# Patient Record
Sex: FEMALE | Race: NATIVE HAWAIIAN OR OTHER PACIFIC ISLANDER | HISPANIC OR LATINO | Employment: FULL TIME | URBAN - METROPOLITAN AREA
[De-identification: names, ages, dates, MRNs, and addresses within clinical notes are randomized per-mention and may not be internally consistent; named-entity substitution may affect disease eponyms.]

---

## 2017-01-16 ENCOUNTER — ALLSCRIPTS OFFICE VISIT (OUTPATIENT)
Dept: OTHER | Facility: OTHER | Age: 47
End: 2017-01-16

## 2017-02-07 ENCOUNTER — ALLSCRIPTS OFFICE VISIT (OUTPATIENT)
Dept: OTHER | Facility: OTHER | Age: 47
End: 2017-02-07

## 2017-02-07 DIAGNOSIS — R80.9 PROTEINURIA: ICD-10-CM

## 2017-09-12 ENCOUNTER — GENERIC CONVERSION - ENCOUNTER (OUTPATIENT)
Dept: OTHER | Facility: OTHER | Age: 47
End: 2017-09-12

## 2017-09-12 ENCOUNTER — GENERIC CONVERSION - ENCOUNTER (OUTPATIENT)
Dept: NEPHROLOGY | Facility: CLINIC | Age: 47
End: 2017-09-12

## 2017-09-12 DIAGNOSIS — I10 ESSENTIAL (PRIMARY) HYPERTENSION: ICD-10-CM

## 2018-01-13 VITALS
BODY MASS INDEX: 35.51 KG/M2 | SYSTOLIC BLOOD PRESSURE: 120 MMHG | RESPIRATION RATE: 16 BRPM | WEIGHT: 208 LBS | HEIGHT: 64 IN | HEART RATE: 60 BPM | DIASTOLIC BLOOD PRESSURE: 88 MMHG | TEMPERATURE: 98.3 F

## 2018-01-15 VITALS
HEIGHT: 64 IN | DIASTOLIC BLOOD PRESSURE: 88 MMHG | SYSTOLIC BLOOD PRESSURE: 128 MMHG | HEART RATE: 60 BPM | BODY MASS INDEX: 35.51 KG/M2 | WEIGHT: 208 LBS

## 2018-01-22 VITALS — RESPIRATION RATE: 16 BRPM | SYSTOLIC BLOOD PRESSURE: 120 MMHG | DIASTOLIC BLOOD PRESSURE: 70 MMHG | HEART RATE: 60 BPM

## 2018-01-22 VITALS — BODY MASS INDEX: 34.31 KG/M2 | HEIGHT: 64 IN | WEIGHT: 201 LBS | TEMPERATURE: 98.3 F

## 2018-03-05 DIAGNOSIS — G43.009 MIGRAINE WITHOUT AURA AND WITHOUT STATUS MIGRAINOSUS, NOT INTRACTABLE: Primary | ICD-10-CM

## 2018-03-05 RX ORDER — SUMATRIPTAN SUCC/NAPROXEN SOD 85MG-500MG
TABLET ORAL
Qty: 9 TABLET | Refills: 0 | OUTPATIENT
Start: 2018-03-05

## 2018-03-05 RX ORDER — SUMATRIPTAN AND NAPROXEN SODIUM 85; 500 MG/1; MG/1
1 TABLET, FILM COATED ORAL EVERY 2 HOUR PRN
Qty: 9 TABLET | Refills: 5 | Status: SHIPPED | OUTPATIENT
Start: 2018-03-05 | End: 2018-06-12 | Stop reason: SDUPTHER

## 2018-06-12 ENCOUNTER — OFFICE VISIT (OUTPATIENT)
Dept: NEUROLOGY | Facility: CLINIC | Age: 48
End: 2018-06-12
Payer: COMMERCIAL

## 2018-06-12 VITALS
SYSTOLIC BLOOD PRESSURE: 134 MMHG | DIASTOLIC BLOOD PRESSURE: 84 MMHG | HEIGHT: 63 IN | BODY MASS INDEX: 34.94 KG/M2 | HEART RATE: 53 BPM | WEIGHT: 197.2 LBS

## 2018-06-12 DIAGNOSIS — G43.009 MIGRAINE WITHOUT AURA AND WITHOUT STATUS MIGRAINOSUS, NOT INTRACTABLE: Primary | ICD-10-CM

## 2018-06-12 PROCEDURE — 99213 OFFICE O/P EST LOW 20 MIN: CPT | Performed by: PSYCHIATRY & NEUROLOGY

## 2018-06-12 RX ORDER — SUMATRIPTAN AND NAPROXEN SODIUM 85; 500 MG/1; MG/1
TABLET, FILM COATED ORAL
Qty: 9 TABLET | Refills: 5 | Status: SHIPPED | OUTPATIENT
Start: 2018-06-12 | End: 2019-01-02 | Stop reason: SDUPTHER

## 2018-06-12 RX ORDER — NEBIVOLOL 10 MG/1
1 TABLET ORAL DAILY
COMMUNITY
End: 2018-08-02 | Stop reason: SDUPTHER

## 2018-06-12 RX ORDER — DIPHENHYDRAMINE HCL 25 MG
25 CAPSULE ORAL
COMMUNITY
End: 2020-11-17 | Stop reason: ALTCHOICE

## 2018-06-12 NOTE — PROGRESS NOTES
Shravan Milian is a 46 y o  female returns in follow-up today with history of migraine    Assessment:  1  Migraine without aura and without status migrainosus, not intractable        Plan:  Treximet as needed for migraine headache  Follow-up 6 months    Discussion:  Anthony Alba has been having about 4 migraine headaches a month  Have recommended initiating Treximet  If there issues with her insurance she understands that we can prescribe Imitrex and she can just take 2 Aleve with the medication  I will see her back in 6 months      Subjective:    HPI  Anthony Alba reports that since here last she had gone through menopause but then started having cycles again  She underwent evaluation gynecologically and no abnormalities were found  Because she is having cycles again she has started having more headaches  She states on average she has about 1 migraine a week     Several months ago she tried to renew her Treximet and was told that it would not be covered by her insurance  Since that time has gone generic  Will try to prescribe it again and if it is rejected she understands that she can take Imitrex with Aleve for similar affect      Past Medical History:   Diagnosis Date    Fatigue     Hypertension     Migraine     Proteinuria        Family History:  Family History   Problem Relation Age of Onset    Stroke Mother     Diabetes Mother     Hypertension Mother     Kidney failure Mother     Dialysis Mother     No Known Problems Father        Past Surgical History:  Past Surgical History:   Procedure Laterality Date    ENDOMETRIAL BIOPSY  02/26/2018    FOOT SURGERY Bilateral     reconstructive    TONSILLECTOMY      TUBAL LIGATION         Social History:   reports that she has never smoked  She has never used smokeless tobacco  She reports that she does not drink alcohol or use drugs      Allergies:  Amoxicillin; Ciprofloxacin; Hydrocodone-acetaminophen; Nitrofurantoin; Sulfa antibiotics; Sulfamethoxazole-trimethoprim; Influenza vaccines; Lisinopril; Oxycodone; Quinolones; and Amlodipine      Current Outpatient Prescriptions:     diphenhydrAMINE (BENADRYL) 25 mg capsule, Take by mouth, Disp: , Rfl:     nebivolol (BYSTOLIC) 10 mg tablet, Take 1 tablet by mouth daily, Disp: , Rfl:     norethindrone (AYGESTIN) 5 mg tablet, Take 5 mg by mouth daily, Disp: , Rfl:     SUMAtriptan-naproxen (TREXIMET)  MG per tablet, Take 1 tablet by mouth every 2 (two) hours as needed for migraine (headache) Max 2/24 hr, Disp: 9 tablet, Rfl: 5    I have reviewed the past medical, social and family history, current medications, allergies, vitals, review of systems and updated this information as appropriate today     Objective:    Vitals:  Blood pressure 134/84, pulse (!) 53, height 5' 3" (1 6 m), weight 89 4 kg (197 lb 3 2 oz)  Physical Exam    Neurological Exam  GENERAL:  Well-developed well-nourished woman in no acute distress  HEENT/NECK: Head is atraumatic normocephalic, neck is supple  CARDIOVASCULAR:  No Carotid bruit  NEUROLOGIC:  Mental Status: Awake and alert without aphasia  Cranial Nerves: Extraocular movements are full  Face is symmetrical  Motor:  No drift is noted on arm extension  Coordination:  Finger-to-nose testing is performed accurately  Romberg is negative  Gait is stable              ROS:    Review of Systems   Constitutional: Negative for chills, fatigue and fever  HENT: Negative for congestion, postnasal drip, sinus pain, sinus pressure, sore throat, tinnitus and trouble swallowing  Eyes: Negative for pain, discharge and visual disturbance  Respiratory: Negative for cough, shortness of breath and wheezing  Cardiovascular: Negative  Gastrointestinal: Negative for abdominal distention, abdominal pain, nausea and vomiting  Endocrine: Negative for cold intolerance and heat intolerance  Genitourinary: Negative for difficulty urinating, frequency, hematuria and urgency  Musculoskeletal: Negative for arthralgias, back pain, gait problem, neck pain and neck stiffness  Skin: Negative for rash and wound  Allergic/Immunologic: Negative for food allergies  Neurological: Positive for headaches  Negative for dizziness, tremors, seizures, syncope, facial asymmetry, speech difficulty, weakness, light-headedness and numbness  Hematological: Negative  Psychiatric/Behavioral: Negative for confusion, decreased concentration and sleep disturbance

## 2018-08-02 DIAGNOSIS — I10 ESSENTIAL HYPERTENSION: Primary | ICD-10-CM

## 2018-08-02 RX ORDER — NEBIVOLOL 10 MG/1
10 TABLET ORAL DAILY
Qty: 90 TABLET | Refills: 0 | Status: SHIPPED | OUTPATIENT
Start: 2018-08-02 | End: 2018-12-10 | Stop reason: SDUPTHER

## 2018-08-02 NOTE — TELEPHONE ENCOUNTER
Shelia needs a refill for Bystolic 10 mg; take 1 tablet daily; qty 90 send to [facility]

## 2018-11-01 ENCOUNTER — TELEPHONE (OUTPATIENT)
Dept: NEPHROLOGY | Facility: CLINIC | Age: 48
End: 2018-11-01

## 2018-11-01 DIAGNOSIS — I10 ESSENTIAL HYPERTENSION: Primary | ICD-10-CM

## 2018-11-01 NOTE — TELEPHONE ENCOUNTER
[address] has an appointment on Wednesday 11/7/18 with Dr Jayden Uriarte, and she needs orders for blood work fax to the LV Lab at the [facility]   Fax # [phone]

## 2018-11-07 ENCOUNTER — OFFICE VISIT (OUTPATIENT)
Dept: NEPHROLOGY | Facility: CLINIC | Age: 48
End: 2018-11-07
Payer: COMMERCIAL

## 2018-11-07 VITALS — WEIGHT: 200 LBS | TEMPERATURE: 98 F | BODY MASS INDEX: 35.44 KG/M2 | HEIGHT: 63 IN

## 2018-11-07 DIAGNOSIS — I10 ESSENTIAL (PRIMARY) HYPERTENSION: Primary | ICD-10-CM

## 2018-11-07 DIAGNOSIS — R80.1 PERSISTENT PROTEINURIA: ICD-10-CM

## 2018-11-07 PROCEDURE — 99213 OFFICE O/P EST LOW 20 MIN: CPT | Performed by: INTERNAL MEDICINE

## 2018-11-07 RX ORDER — ALBUTEROL SULFATE 90 UG/1
1 AEROSOL, METERED RESPIRATORY (INHALATION) EVERY 6 HOURS PRN
COMMUNITY
End: 2020-11-17 | Stop reason: ALTCHOICE

## 2018-11-07 NOTE — PATIENT INSTRUCTIONS
Chronic Kidney Disease   AMBULATORY CARE:   Chronic kidney disease (CKD)  is the gradual and permanent loss of kidney function  Normally, the kidneys remove fluid, chemicals, and waste from your blood  These wastes are turned into urine by your kidneys  CKD may worsen over time and lead to kidney failure  Common symptoms include the following:   · Changes in how often you need to urinate    · Swelling in your arms, legs, or feet    · Shortness of breath    · Fatigue or weakness    · Bad or bitter taste in your mouth    · Nausea, vomiting, or loss of appetite  Seek care immediately if:   · You are confused and very drowsy  · You have a seizure  · You have shortness of breath  Contact your healthcare provider if:   · You suddenly gain or lose more weight than your healthcare provider has told you is okay  · You have itchy skin or a rash  · You urinate more or less than you normally do  · You have blood in your urine  · You have nausea and repeated vomiting  · You have fatigue or muscle weakness  · You have hiccups that will not stop  · You have questions or concerns about your condition or care  Treatment for CKD:  Medicines may be given to decrease blood pressure and get rid of extra fluid  You may also receive medicine to manage health conditions that may occur with CKD  Dialysis is a treatment to remove chemicals and waste from your blood when your kidneys can no longer do this  Surgery may be needed to create an arteriovenous fistula (AVF) in your arm or insert a catheter into your abdomen so that you can receive dialysis  A kidney transplant may be done if your CKD becomes severe  Manage CKD:   · Maintain a healthy weight  Ask your healthcare provider how much you should weigh  Ask him to help you create a weight loss plan if you are overweight  · Exercise 30 to 60 minutes a day, 4 to 7 times a week, or as directed  Ask about the best exercise plan for you   Regular exercise can help you manage CKD, high blood pressure, and diabetes  · Follow your healthcare provider's advice about what to eat and drink  He may tell you to eat food low in sodium (salt), potassium, phosphorus, or protein  You may need to see a dietitian if you need help planning meals  Ask how much liquid to drink each day and which liquids are best for you  · Limit alcohol  Ask how much alcohol is safe for you to drink  A drink of alcohol is 12 ounces of beer, 5 ounces of wine, or 1½ ounces of liquor  · Do not smoke  Nicotine and other chemicals in cigarettes and cigars can cause lung and kidney damage  Ask your healthcare provider for information if you currently smoke and need help to quit  E-cigarettes or smokeless tobacco still contain nicotine  Talk to your healthcare provider before you use these products  · Ask your healthcare provider if you need vaccines  Infections such as pneumonia, influenza, and hepatitis can be more harmful or more likely to occur in a person who has CKD  Vaccines reduce your risk of infection with these viruses  Follow up with your healthcare provider as directed:  Write down your questions so you remember to ask them during your visits  © 2017 [employer] Information is for End User's use only and may not be sold, redistributed or otherwise used for commercial purposes  All illustrations and images included in CareNotes® are the copyrighted property of [employer]  or [employer]  The above information is an  only  It is not intended as medical advice for individual conditions or treatments  Talk to your doctor, nurse or pharmacist before following any medical regimen to see if it is safe and effective for you

## 2018-11-07 NOTE — LETTER
November 7, 2018     Gabi Osborne MD  [address] [place] [address] Fareed Crowley [address] [place] [id number]    Patient: Ivelisse Pedraza   YOB: 1970   Date of Visit: 11/7/2018       Dear Dr Mey Wong: Thank you for referring Ivelisse Pedraza to me for evaluation  Below are my notes for this consultation  If you have questions, please do not hesitate to call me  I look forward to following your patient along with you  Sincerely,        Carly Quick MD        CC: No Recipients  Carly Quick MD  11/7/2018  4:31 PM  Sign at close encounter  Starr Hammond 46 y o  female MRN: [id number]    Encounter: [id number] 11/7/2018    REASON FOR VISIT: Ivelisse Pedraza is a 46 y o  female who is here on 11/7/2018 for Follow-up    HPI:    Renard Greenfield came in today for follow-up of hypertension and proteinuria  Since I saw her last she has a total hysterectomy done and she was off the work almost for 2 months  She just started working again  She claims her blood pressure was running high before surgery requiring increasing dose of Bystolic  She denies any headache no other acute complaint        REVIEW OF SYSTEMS:    Review of Systems   Constitutional: Negative for activity change and fatigue  HENT: Negative for congestion and ear discharge  Eyes: Negative for photophobia and pain  Respiratory: Negative for apnea and choking  Cardiovascular: Negative for chest pain and palpitations  Gastrointestinal: Negative for abdominal distention and blood in stool  Endocrine: Negative for heat intolerance and polyphagia  Genitourinary: Negative for flank pain and urgency  Musculoskeletal: Negative for neck pain and neck stiffness  Skin: Negative for color change and wound  Allergic/Immunologic: Negative for food allergies and immunocompromised state  Neurological: Negative for seizures and facial asymmetry  Hematological: Negative for adenopathy  Does not bruise/bleed easily  Psychiatric/Behavioral: Negative for self-injury and suicidal ideas  PAST MEDICAL HISTORY:  Past Medical History:   Diagnosis Date    Fatigue     Hypertension     Migraine     Proteinuria        PAST SURGICAL HISTORY:  Past Surgical History:   Procedure Laterality Date    ENDOMETRIAL BIOPSY  02/26/2018    FOOT SURGERY Bilateral     reconstructive    HYSTERECTOMY  09/04/2018    TONSILLECTOMY      TUBAL LIGATION         SOCIAL HISTORY:  History   Alcohol Use No     History   Drug Use No     History   Smoking Status    Never Smoker   Smokeless Tobacco    Never Used       FAMILY HISTORY:  Family History   Problem Relation Age of Onset    Stroke Mother     Diabetes Mother     Hypertension Mother     Kidney failure Mother     Dialysis Mother     No Known Problems Father        MEDICATIONS:    Current Outpatient Prescriptions:     albuterol (PROVENTIL HFA,VENTOLIN HFA) 90 mcg/act inhaler, Inhale 1 puff every 6 (six) hours as needed, Disp: , Rfl:     conjugated estrogens (PREMARIN) 0 9 MG tablet, Take 0 9 mg by mouth, Disp: , Rfl:     diphenhydrAMINE (BENADRYL) 25 mg capsule, Take by mouth, Disp: , Rfl:     nebivolol (BYSTOLIC) 10 mg tablet, Take 1 tablet (10 mg total) by mouth daily (Patient taking differently: Take 20 mg by mouth daily  ), Disp: 90 tablet, Rfl: 0    norethindrone (AYGESTIN) 5 mg tablet, Take 5 mg by mouth daily, Disp: , Rfl:     SUMAtriptan-naproxen (TREXIMET)  MG per tablet, One p  o  at headache onset, may repeat 1 after 2 hours p r n     Maximum of 2/24 hours, Disp: 9 tablet, Rfl: 5    PHYSICAL EXAM:  Vitals:    11/07/18 1609   Temp: 98 °F (36 7 °C)   TempSrc: Tympanic   Weight: 90 7 kg (200 lb)   Height: 5' 3" (1 6 m)     Body mass index is 35 43 kg/m²  Physical Exam   Constitutional: She is oriented to person, place, and time  She appears well-developed  No distress  HENT:   Head: Normocephalic     Mouth/Throat: Oropharynx is clear and moist    Eyes: Conjunctivae are normal  No scleral icterus  Neck: Neck supple  No JVD present  Cardiovascular: Normal rate and normal heart sounds  Pulmonary/Chest: Effort normal  She has no wheezes  Abdominal: Soft  There is no tenderness  Musculoskeletal: Normal range of motion  She exhibits no edema  Neurological: She is alert and oriented to person, place, and time  Skin: Skin is warm  No rash noted  Psychiatric: She has a normal mood and affect  Her behavior is normal        LAB RESULTS:  No results found for this or any previous visit  ASSESSMENT and PLAN:      Essential (primary) hypertension  Blood pressure is high  She claims is partly because of work related stress and also anxiety  I advised her to monitor blood pressure at home and if it remains high either call me or call her primary doctor  Proteinuria  Proteinuria is quite stable urine dipstick is negative and total protein loss is almost like 110 mg which is quite normal      Advised to continue same medication but monitor blood pressure closely  I will see her back in 1 year unless blood pressure started going up and she will call me        Portions of the record may have been created with voice recognition software  Occasional wrong word or "sound a like" substitutions may have occurred due to the inherent limitations of voice recognition software  Read the chart carefully and recognize, using context, where substitutions have occurred  If you have any questions, please contact the dictating provider

## 2018-11-07 NOTE — PROGRESS NOTES
NEPHROLOGY OFFICE FOLLOW UP  Blake Simmons 46 y o  female MRN: [id number]    Encounter: [id number] 11/7/2018    REASON FOR VISIT: Blake Simmons is a 46 y o  female who is here on 11/7/2018 for Follow-up    HPI:    Julia Daley came in today for follow-up of hypertension and proteinuria  Since I saw her last she has a total hysterectomy done and she was off the work almost for 2 months  She just started working again  She claims her blood pressure was running high before surgery requiring increasing dose of Bystolic  She denies any headache no other acute complaint        REVIEW OF SYSTEMS:    Review of Systems   Constitutional: Negative for activity change and fatigue  HENT: Negative for congestion and ear discharge  Eyes: Negative for photophobia and pain  Respiratory: Negative for apnea and choking  Cardiovascular: Negative for chest pain and palpitations  Gastrointestinal: Negative for abdominal distention and blood in stool  Endocrine: Negative for heat intolerance and polyphagia  Genitourinary: Negative for flank pain and urgency  Musculoskeletal: Negative for neck pain and neck stiffness  Skin: Negative for color change and wound  Allergic/Immunologic: Negative for food allergies and immunocompromised state  Neurological: Negative for seizures and facial asymmetry  Hematological: Negative for adenopathy  Does not bruise/bleed easily  Psychiatric/Behavioral: Negative for self-injury and suicidal ideas           PAST MEDICAL HISTORY:  Past Medical History:   Diagnosis Date    Fatigue     Hypertension     Migraine     Proteinuria        PAST SURGICAL HISTORY:  Past Surgical History:   Procedure Laterality Date    ENDOMETRIAL BIOPSY  02/26/2018    FOOT SURGERY Bilateral     reconstructive    HYSTERECTOMY  09/04/2018    TONSILLECTOMY      TUBAL LIGATION         SOCIAL HISTORY:  History   Alcohol Use No     History   Drug Use No     History   Smoking Status    Never Smoker   Smokeless Tobacco    Never Used       FAMILY HISTORY:  Family History   Problem Relation Age of Onset    Stroke Mother     Diabetes Mother     Hypertension Mother     Kidney failure Mother     Dialysis Mother     No Known Problems Father        MEDICATIONS:    Current Outpatient Prescriptions:     albuterol (PROVENTIL HFA,VENTOLIN HFA) 90 mcg/act inhaler, Inhale 1 puff every 6 (six) hours as needed, Disp: , Rfl:     conjugated estrogens (PREMARIN) 0 9 MG tablet, Take 0 9 mg by mouth, Disp: , Rfl:     diphenhydrAMINE (BENADRYL) 25 mg capsule, Take by mouth, Disp: , Rfl:     nebivolol (BYSTOLIC) 10 mg tablet, Take 1 tablet (10 mg total) by mouth daily (Patient taking differently: Take 20 mg by mouth daily  ), Disp: 90 tablet, Rfl: 0    norethindrone (AYGESTIN) 5 mg tablet, Take 5 mg by mouth daily, Disp: , Rfl:     SUMAtriptan-naproxen (TREXIMET)  MG per tablet, One p  o  at headache onset, may repeat 1 after 2 hours p r n     Maximum of 2/24 hours, Disp: 9 tablet, Rfl: 5    PHYSICAL EXAM:  Vitals:    11/07/18 1609   Temp: 98 °F (36 7 °C)   TempSrc: Tympanic   Weight: 90 7 kg (200 lb)   Height: 5' 3" (1 6 m)     Body mass index is 35 43 kg/m²  Physical Exam   Constitutional: She is oriented to person, place, and time  She appears well-developed  No distress  HENT:   Head: Normocephalic  Mouth/Throat: Oropharynx is clear and moist    Eyes: Conjunctivae are normal  No scleral icterus  Neck: Neck supple  No JVD present  Cardiovascular: Normal rate and normal heart sounds  Pulmonary/Chest: Effort normal  She has no wheezes  Abdominal: Soft  There is no tenderness  Musculoskeletal: Normal range of motion  She exhibits no edema  Neurological: She is alert and oriented to person, place, and time  Skin: Skin is warm  No rash noted  Psychiatric: She has a normal mood and affect   Her behavior is normal        LAB RESULTS:  No results found for this or any previous visit     ASSESSMENT and PLAN:      Essential (primary) hypertension  Blood pressure is high  She claims is partly because of work related stress and also anxiety  I advised her to monitor blood pressure at home and if it remains high either call me or call her primary doctor  Proteinuria  Proteinuria is quite stable urine dipstick is negative and total protein loss is almost like 110 mg which is quite normal      Advised to continue same medication but monitor blood pressure closely  I will see her back in 1 year unless blood pressure started going up and she will call me        Portions of the record may have been created with voice recognition software  Occasional wrong word or "sound a like" substitutions may have occurred due to the inherent limitations of voice recognition software  Read the chart carefully and recognize, using context, where substitutions have occurred  If you have any questions, please contact the dictating provider

## 2018-11-07 NOTE — ASSESSMENT & PLAN NOTE
Proteinuria is quite stable urine dipstick is negative and total protein loss is almost like 110 mg which is quite normal

## 2018-11-07 NOTE — ASSESSMENT & PLAN NOTE
Blood pressure is high  She claims is partly because of work related stress and also anxiety  I advised her to monitor blood pressure at home and if it remains high either call me or call her primary doctor

## 2018-11-19 ENCOUNTER — TELEPHONE (OUTPATIENT)
Dept: NEUROLOGY | Facility: CLINIC | Age: 48
End: 2018-11-19

## 2018-12-07 DIAGNOSIS — I10 ESSENTIAL HYPERTENSION: ICD-10-CM

## 2018-12-07 NOTE — TELEPHONE ENCOUNTER
Sirisha Zapata called stating that she needs a refill for her Bystolic 04KA 1 a day J/92 refills  Please call [address] [place] [address] 1 @ [phone]  If any questions please call Brice Mohr @ [phone]

## 2018-12-10 RX ORDER — NEBIVOLOL 10 MG/1
20 TABLET ORAL DAILY
Qty: 180 TABLET | Refills: 1 | Status: SHIPPED | OUTPATIENT
Start: 2018-12-10 | End: 2018-12-20 | Stop reason: SDUPTHER

## 2018-12-20 DIAGNOSIS — I10 ESSENTIAL HYPERTENSION: ICD-10-CM

## 2018-12-21 RX ORDER — NEBIVOLOL 10 MG/1
20 TABLET ORAL DAILY
Qty: 60 TABLET | Refills: 2 | Status: SHIPPED | OUTPATIENT
Start: 2018-12-21 | End: 2019-03-01 | Stop reason: SDUPTHER

## 2019-01-02 ENCOUNTER — OFFICE VISIT (OUTPATIENT)
Dept: NEUROLOGY | Facility: CLINIC | Age: 49
End: 2019-01-02
Payer: COMMERCIAL

## 2019-01-02 VITALS
BODY MASS INDEX: 35.97 KG/M2 | HEART RATE: 66 BPM | HEIGHT: 63 IN | SYSTOLIC BLOOD PRESSURE: 132 MMHG | DIASTOLIC BLOOD PRESSURE: 88 MMHG | WEIGHT: 203 LBS

## 2019-01-02 DIAGNOSIS — G43.009 MIGRAINE WITHOUT AURA AND WITHOUT STATUS MIGRAINOSUS, NOT INTRACTABLE: Primary | ICD-10-CM

## 2019-01-02 PROCEDURE — 99213 OFFICE O/P EST LOW 20 MIN: CPT | Performed by: PSYCHIATRY & NEUROLOGY

## 2019-01-02 RX ORDER — SUMATRIPTAN AND NAPROXEN SODIUM 85; 500 MG/1; MG/1
TABLET, FILM COATED ORAL
Qty: 9 TABLET | Refills: 5 | Status: SHIPPED | OUTPATIENT
Start: 2019-01-02 | End: 2019-08-28 | Stop reason: SDUPTHER

## 2019-01-02 RX ORDER — CLOTRIMAZOLE 1 %
CREAM (GRAM) TOPICAL DAILY
COMMUNITY
Start: 2018-11-30 | End: 2020-11-17 | Stop reason: ALTCHOICE

## 2019-01-02 NOTE — PROGRESS NOTES
Theo Prince is a 46 y o  female who returns in follow-up today with history of migraine headache    Assessment:  1  Migraine without aura and without status migrainosus, not intractable        Plan:  Treximet as needed for migraine  Follow-up 6 months    Discussion:  Lopez Jackson reports a significant decrease in her headache frequency since her total hysterectomy  She states she now gets a headache less than once a month, typically if she is stressed or did not sleep well  She continues to report the Treximet as effective in stopping her headache and she will continue with this  I will see her back in follow-up in 6 months    Subjective:    HPI  Lopez Jackson reports that she had a total hysterectomy about 4 months ago  Since that time she has noticed a definite improvement in her migraine headaches  She states she gets a headache less than once a month and it is not as severe as her headaches used to be  She states that she associates these headaches with stress or not sleeping well  She states that if she does get 1 of these headaches she takes a Treximet and the headache goes away right away  She notes no adverse effects from the medication  She denies any other medical issues      Past Medical History:   Diagnosis Date    Fatigue     Hypertension     Migraine     Proteinuria        Family History:  Family History   Problem Relation Age of Onset    Stroke Mother     Diabetes Mother     Hypertension Mother     Kidney failure Mother     Dialysis Mother     No Known Problems Father        Past Surgical History:  Past Surgical History:   Procedure Laterality Date    ENDOMETRIAL BIOPSY  02/26/2018    FOOT SURGERY Bilateral     reconstructive    HYSTERECTOMY  09/04/2018    TONSILLECTOMY      TUBAL LIGATION         Social History:   reports that she has never smoked  She has never used smokeless tobacco  She reports that she does not drink alcohol or use drugs      Allergies:  Amoxicillin; Ciprofloxacin; Hydrocodone-acetaminophen; Nitrofurantoin; Sulfa antibiotics; Sulfamethoxazole-trimethoprim; Influenza vaccines; Lisinopril; Oxycodone; Quinolones; Amlodipine; and Other      Current Outpatient Prescriptions:     albuterol (PROVENTIL HFA,VENTOLIN HFA) 90 mcg/act inhaler, Inhale 1 puff every 6 (six) hours as needed, Disp: , Rfl:     clotrimazole (LOTRIMIN) 1 % cream, daily, Disp: , Rfl:     conjugated estrogens (PREMARIN) 0 9 MG tablet, Take 0 9 mg by mouth daily  , Disp: , Rfl:     diphenhydrAMINE (BENADRYL) 25 mg capsule, Take 25 mg by mouth daily at bedtime as needed  , Disp: , Rfl:     nebivolol (BYSTOLIC) 10 mg tablet, Take 2 tablets (20 mg total) by mouth daily, Disp: 60 tablet, Rfl: 2    SUMAtriptan-naproxen (TREXIMET)  MG per tablet, One p  o  at headache onset, may repeat 1 after 2 hours p r n     Maximum of 2/24 hours, Disp: 9 tablet, Rfl: 5    I have reviewed the past medical, social and family history, current medications, allergies, vitals, review of systems and updated this information as appropriate today     Objective:    Vitals:  Blood pressure 132/88, pulse 66, height 5' 3" (1 6 m), weight 92 1 kg (203 lb)  Physical Exam    Neurological Exam  GENERAL:  Well-developed well-nourished woman in no acute distress  HEENT/NECK: Head is atraumatic normocephalic, neck is supple  CARDIOVASCULAR:   No Carotid bruit  NEUROLOGIC:  Mental Status: Awake and alert without aphasia  Cranial Nerves: Extraocular movements are full  Face is symmetrical  Motor:   No drift is noted on arm extension  Coordination:   Finger-to-nose testing is performed accurately  Romberg is negative  Gait is stable            ROS:    Review of Systems   Constitutional: Negative  Negative for appetite change and fever  HENT: Negative  Negative for hearing loss, tinnitus, trouble swallowing and voice change  Eyes: Negative  Negative for photophobia and pain  Respiratory: Negative    Negative for shortness of breath  Cardiovascular: Negative  Negative for palpitations  Gastrointestinal: Negative  Negative for nausea and vomiting  Endocrine: Negative  Negative for cold intolerance and heat intolerance  Genitourinary: Negative  Negative for dysuria, frequency and urgency  Musculoskeletal: Negative  Negative for myalgias and neck pain  Skin: Negative  Negative for rash  Neurological: Negative for dizziness, tremors, seizures, syncope, facial asymmetry, speech difficulty, weakness, light-headedness, numbness and headaches  Hematological: Negative  Does not bruise/bleed easily  Psychiatric/Behavioral: Negative  Negative for confusion, hallucinations and sleep disturbance  All other systems reviewed and are negative

## 2019-02-28 ENCOUNTER — TELEPHONE (OUTPATIENT)
Dept: NEPHROLOGY | Facility: CLINIC | Age: 49
End: 2019-02-28

## 2019-02-28 NOTE — TELEPHONE ENCOUNTER
Pt called wanting to know if there was maybe a generic brand to the Bystolic you prescribed  Her copay for it is very high and actually has not been taking it for the last 2 months  So wants to know if there is either a different brand or a generic to it she can get

## 2019-03-01 DIAGNOSIS — I10 ESSENTIAL HYPERTENSION: ICD-10-CM

## 2019-03-01 RX ORDER — NEBIVOLOL 20 MG/1
20 TABLET ORAL DAILY
Qty: 90 TABLET | Refills: 3 | Status: SHIPPED | OUTPATIENT
Start: 2019-03-01 | End: 2019-03-01 | Stop reason: SDUPTHER

## 2019-03-01 RX ORDER — NEBIVOLOL 20 MG/1
20 TABLET ORAL DAILY
Qty: 90 TABLET | Refills: 3 | Status: SHIPPED | OUTPATIENT
Start: 2019-03-01 | End: 2020-11-17

## 2019-03-01 NOTE — TELEPHONE ENCOUNTER
Renetta Bunch called and said [facility] called her and they told her they don't have the nebivolol (BYSTOLIC) 20 mg tablet for blood pressure; take 1 tablet  By mouth daily  Jerrell Jordan has to be 28   Renetta Bunch said to send it to [address] [facility] in the [facility] phone number [phone] Fax number [phone]

## 2019-03-01 NOTE — TELEPHONE ENCOUNTER
Spoke with the patient and I sent in Nebivolol into [facility] for her and I informed her that if its too expensive to give us a call and let us know

## 2019-03-01 NOTE — TELEPHONE ENCOUNTER
I think she was on generic, Nebivolol  You can try that again     If it is still very expensive we can switch her to regular beta-blocker which is metoprolol and that we will start with 50 twice a day

## 2019-07-13 DIAGNOSIS — G43.009 MIGRAINE WITHOUT AURA AND WITHOUT STATUS MIGRAINOSUS, NOT INTRACTABLE: ICD-10-CM

## 2019-07-15 RX ORDER — SUMATRIPTAN AND NAPROXEN SODIUM 85; 500 MG/1; MG/1
TABLET, FILM COATED ORAL
Qty: 9 TABLET | Refills: 5 | Status: SHIPPED | OUTPATIENT
Start: 2019-07-15 | End: 2019-08-28 | Stop reason: SDUPTHER

## 2019-08-28 ENCOUNTER — OFFICE VISIT (OUTPATIENT)
Dept: NEUROLOGY | Facility: CLINIC | Age: 49
End: 2019-08-28
Payer: COMMERCIAL

## 2019-08-28 VITALS
BODY MASS INDEX: 37.21 KG/M2 | HEART RATE: 60 BPM | DIASTOLIC BLOOD PRESSURE: 78 MMHG | SYSTOLIC BLOOD PRESSURE: 120 MMHG | HEIGHT: 63 IN | WEIGHT: 210 LBS

## 2019-08-28 DIAGNOSIS — G43.009 MIGRAINE WITHOUT AURA AND WITHOUT STATUS MIGRAINOSUS, NOT INTRACTABLE: Primary | ICD-10-CM

## 2019-08-28 PROCEDURE — 99213 OFFICE O/P EST LOW 20 MIN: CPT | Performed by: PSYCHIATRY & NEUROLOGY

## 2019-08-28 RX ORDER — SUMATRIPTAN AND NAPROXEN SODIUM 85; 500 MG/1; MG/1
TABLET, FILM COATED ORAL
Qty: 9 TABLET | Refills: 5 | Status: SHIPPED | OUTPATIENT
Start: 2019-08-28 | End: 2020-08-25 | Stop reason: SDUPTHER

## 2019-08-28 RX ORDER — MELOXICAM 15 MG/1
1 TABLET ORAL DAILY
COMMUNITY
End: 2020-11-17 | Stop reason: ALTCHOICE

## 2019-08-28 NOTE — PROGRESS NOTES
Mandie Israel is a 44 y o  female who returns in follow-up today with history of migraine headache    Assessment:  1  Migraine without aura and without status migrainosus, not intractable        Plan:  Continue Treximet as needed  Follow-up 6 months    Discussion:  Pelon Alfaro reports her migraine headaches are under good control occurring once or twice a month and Treximet is effective in stopping her headache when she gets it  She understands that she should not use Mobic on the day she is using Treximet  She would like to come back to see me in 1 year      Subjective:    HPI  Source reports overall she has been doing well  She states she is getting migraine headaches about once or twice a month and Treximet is very effective in stopping it  She has no problems tolerating the medication  She states recently she was having pain in her left hip in her left knee and was diagnosed with some arthritis and bursitis  She was prescribed Mobic for the pain and anticipates initiating physical therapy  She understands that there is naproxen in Treximet and she should not be taking both naproxen and Mobic together  She states that she has not used the Treximet since she started the Mobic and on days that she needs the Treximet she will not take the Mobic  Past Medical History:   Diagnosis Date    Fatigue     Hypertension     Migraine     Proteinuria        Family History:  Family History   Problem Relation Age of Onset    Stroke Mother     Diabetes Mother     Hypertension Mother     Kidney failure Mother     Dialysis Mother     No Known Problems Father        Past Surgical History:  Past Surgical History:   Procedure Laterality Date    ENDOMETRIAL BIOPSY  02/26/2018    FOOT SURGERY Bilateral     reconstructive    HYSTERECTOMY  09/04/2018    TONSILLECTOMY      TUBAL LIGATION         Social History:   reports that she has never smoked   She has never used smokeless tobacco  She reports that she does not drink alcohol or use drugs  Allergies:  Amoxicillin; Ciprofloxacin; Hydrocodone-acetaminophen; Nitrofurantoin; Sulfa antibiotics; Sulfamethoxazole-trimethoprim; Influenza vaccines; Lisinopril; Oxycodone; Quinolones; Amlodipine; and Other      Current Outpatient Medications:     albuterol (PROVENTIL HFA,VENTOLIN HFA) 90 mcg/act inhaler, Inhale 1 puff every 6 (six) hours as needed, Disp: , Rfl:     clotrimazole (LOTRIMIN) 1 % cream, daily, Disp: , Rfl:     diphenhydrAMINE (BENADRYL) 25 mg capsule, Take 25 mg by mouth daily at bedtime as needed  , Disp: , Rfl:     meloxicam (MOBIC) 15 mg tablet, Take 1 tablet by mouth Daily, Disp: , Rfl:     nebivolol (BYSTOLIC) 20 MG tablet, Take 1 tablet (20 mg total) by mouth daily Nebivolol only!!, Disp: 90 tablet, Rfl: 3    SUMAtriptan-naproxen (TREXIMET)  MG per tablet, One p  o  at headache onset, may repeat 1 after 2 hours p r n     Maximum of 2/24 hours, Disp: 9 tablet, Rfl: 5    I have reviewed the past medical, social and family history, current medications, allergies, vitals, review of systems and updated this information as appropriate today     Objective:    Vitals:  Blood pressure 120/78, pulse 60, height 5' 3" (1 6 m), weight 95 3 kg (210 lb)  Physical Exam    Neurological Exam  GENERAL:  Well-developed well-nourished woman in no acute distress  HEENT/NECK: Head is atraumatic normocephalic, neck is supple  NEUROLOGIC:  Mental Status: Awake and alert without aphasia  Cranial Nerves: Extraocular movements are full  Face is symmetrical, with exception of widening of the right palpebral fissure relative to the left  No drift is noted on arm extension  Coordination:  Finger-to-nose testing is performed accurately  Romberg is negative  ROS:    Review of Systems   Constitutional: Negative  Negative for appetite change, chills, fatigue and fever  HENT: Negative  Negative for hearing loss, tinnitus, trouble swallowing and voice change  Eyes: Negative  Negative for photophobia, pain and visual disturbance  Respiratory: Negative  Negative for shortness of breath and wheezing  Cardiovascular: Negative  Negative for chest pain and palpitations  Gastrointestinal: Negative  Negative for nausea and vomiting  Endocrine: Negative  Negative for cold intolerance and heat intolerance  Genitourinary: Negative  Negative for dysuria, frequency and urgency  Musculoskeletal: Positive for arthralgias (left knee, left hip)  Negative for back pain, gait problem, myalgias, neck pain and neck stiffness  Skin: Negative  Negative for rash  Allergic/Immunologic: Negative  Neurological: Negative  Negative for dizziness, tremors, seizures, syncope, facial asymmetry, speech difficulty, weakness, light-headedness, numbness and headaches  Hematological: Negative  Does not bruise/bleed easily  Psychiatric/Behavioral: Negative  Negative for confusion, decreased concentration, hallucinations and sleep disturbance

## 2019-09-18 ENCOUNTER — TELEPHONE (OUTPATIENT)
Dept: NEPHROLOGY | Facility: CLINIC | Age: 49
End: 2019-09-18

## 2019-09-18 NOTE — TELEPHONE ENCOUNTER
Discussed the patient  She is going to make appointment OBGYN    If bleeding persist she will go to the ER

## 2019-09-18 NOTE — TELEPHONE ENCOUNTER
Renard Gonsalez a patient of Dr Devin Hess called and said she urinated this morning and when she went to clean herself she had a lot of blood on the paper and Renard Gonsalez said she is still spotting  Renard Gonsalez said she did leave a message for her GYN as well due to her having a hysterectomy a year ago but she is also concern because she does have problems with her kidneys   Shelia phone number is [phone]  Faye Beltran

## 2019-11-12 ENCOUNTER — OFFICE VISIT (OUTPATIENT)
Dept: NEPHROLOGY | Facility: CLINIC | Age: 49
End: 2019-11-12
Payer: COMMERCIAL

## 2019-11-12 VITALS
HEIGHT: 63 IN | WEIGHT: 208.6 LBS | BODY MASS INDEX: 36.96 KG/M2 | TEMPERATURE: 97.4 F | SYSTOLIC BLOOD PRESSURE: 150 MMHG | DIASTOLIC BLOOD PRESSURE: 80 MMHG | HEART RATE: 68 BPM | RESPIRATION RATE: 16 BRPM

## 2019-11-12 DIAGNOSIS — I10 ESSENTIAL (PRIMARY) HYPERTENSION: Primary | ICD-10-CM

## 2019-11-12 DIAGNOSIS — R80.1 PERSISTENT PROTEINURIA: ICD-10-CM

## 2019-11-12 PROCEDURE — 99213 OFFICE O/P EST LOW 20 MIN: CPT | Performed by: INTERNAL MEDICINE

## 2019-11-12 NOTE — ASSESSMENT & PLAN NOTE
Stable at this point she cannot have ACE-inhibitor because of side effect and allergist Will continue to monitor

## 2019-11-12 NOTE — PROGRESS NOTES
NEPHROLOGY OFFICE FOLLOW UP  Monse Zepeda 44 y o  female MRN: [id number]    Encounter: [id number] 11/12/2019    REASON FOR VISIT: Monse Zepeda is a 44 y o  female who is here on 11/12/2019 for Follow-up and Hypertension    HPI:    Binh Riley came in today for follow-up of hypertension and proteinuria  In between he has she had episode of hematuria which was thought secondary to GYN cost   She was seen by gynecologist had a biopsy of and was thought that she had excess granulomatous tissue on vagina and that is why she was bleeding  She is feeling quite well    No chest pain no palpitation or shortness of Breath      REVIEW OF SYSTEMS:    Review of Systems   Constitutional: Negative for activity change and fatigue  HENT: Negative for congestion and ear discharge  Eyes: Negative for photophobia and pain  Respiratory: Negative for apnea and choking  Cardiovascular: Negative for chest pain and palpitations  Gastrointestinal: Negative for abdominal distention and blood in stool  Endocrine: Negative for heat intolerance and polyphagia  Genitourinary: Negative for flank pain and urgency  Musculoskeletal: Negative for neck pain and neck stiffness  Skin: Negative for color change and wound  Allergic/Immunologic: Negative for food allergies and immunocompromised state  Neurological: Negative for seizures and facial asymmetry  Hematological: Negative for adenopathy  Does not bruise/bleed easily  Psychiatric/Behavioral: Negative for self-injury and suicidal ideas           PAST MEDICAL HISTORY:  Past Medical History:   Diagnosis Date    Fatigue     Hypertension     Migraine     Proteinuria        PAST SURGICAL HISTORY:  Past Surgical History:   Procedure Laterality Date    ENDOMETRIAL BIOPSY  02/26/2018    FOOT SURGERY Bilateral     reconstructive    HYSTERECTOMY  09/04/2018    TONSILLECTOMY      TUBAL LIGATION         SOCIAL HISTORY:  Social History     Substance and Sexual Activity   Alcohol Use No     Social History     Substance and Sexual Activity   Drug Use No     Social History     Tobacco Use   Smoking Status Never Smoker   Smokeless Tobacco Never Used       FAMILY HISTORY:  Family History   Problem Relation Age of Onset    Stroke Mother     Diabetes Mother     Hypertension Mother     Kidney failure Mother     Dialysis Mother     No Known Problems Father        MEDICATIONS:    Current Outpatient Medications:     albuterol (PROVENTIL HFA,VENTOLIN HFA) 90 mcg/act inhaler, Inhale 1 puff every 6 (six) hours as needed, Disp: , Rfl:     clotrimazole (LOTRIMIN) 1 % cream, daily, Disp: , Rfl:     diphenhydrAMINE (BENADRYL) 25 mg capsule, Take 25 mg by mouth daily at bedtime as needed  , Disp: , Rfl:     meloxicam (MOBIC) 15 mg tablet, Take 1 tablet by mouth Daily, Disp: , Rfl:     nebivolol (BYSTOLIC) 20 MG tablet, Take 1 tablet (20 mg total) by mouth daily Nebivolol only!!, Disp: 90 tablet, Rfl: 3    SUMAtriptan-naproxen (TREXIMET)  MG per tablet, One p  o  at headache onset, may repeat 1 after 2 hours p r n     Maximum of 2/24 hours, Disp: 9 tablet, Rfl: 5    PHYSICAL EXAM:  Vitals:    11/12/19 1610   BP: 150/80   BP Location: Right arm   Patient Position: Sitting   Pulse: 68   Resp: 16   Temp: (!) 97 4 °F (36 3 °C)   TempSrc: Tympanic   Weight: 94 6 kg (208 lb 9 6 oz)   Height: 5' 2 5" (1 588 m)     Body mass index is 37 55 kg/m²  Physical Exam   Constitutional: She is oriented to person, place, and time  She appears well-developed  No distress  HENT:   Head: Normocephalic  Mouth/Throat: Oropharynx is clear and moist    Eyes: Conjunctivae are normal  No scleral icterus  Neck: Neck supple  No JVD present  Cardiovascular: Normal rate and normal heart sounds  Pulmonary/Chest: Effort normal  She has no wheezes  Abdominal: Soft  There is no tenderness  Musculoskeletal: Normal range of motion  She exhibits no edema     Neurological: She is alert and oriented to person, place, and time  Skin: Skin is warm  No rash noted  Psychiatric: She has a normal mood and affect  Her behavior is normal        LAB RESULTS:  No results found for this or any previous visit  ASSESSMENT and PLAN:      Essential (primary) hypertension  Reasonably well control at this point will continue beta-blocker which she is on    Proteinuria  Stable at this point she cannot have ACE-inhibitor because of side effect and allergist Will continue to monitor    I will see her back in 1 year  Discussed blood work with  Will get blood and urine test before that visit          Portions of the record may have been created with voice recognition software  Occasional wrong word or "sound a like" substitutions may have occurred due to the inherent limitations of voice recognition software  Read the chart carefully and recognize, using context, where substitutions have occurred  If you have any questions, please contact the dictating provider

## 2020-08-25 ENCOUNTER — OFFICE VISIT (OUTPATIENT)
Dept: NEUROLOGY | Facility: CLINIC | Age: 50
End: 2020-08-25
Payer: COMMERCIAL

## 2020-08-25 VITALS
WEIGHT: 207 LBS | HEART RATE: 64 BPM | SYSTOLIC BLOOD PRESSURE: 150 MMHG | BODY MASS INDEX: 36.68 KG/M2 | HEIGHT: 63 IN | DIASTOLIC BLOOD PRESSURE: 102 MMHG

## 2020-08-25 DIAGNOSIS — G43.009 MIGRAINE WITHOUT AURA AND WITHOUT STATUS MIGRAINOSUS, NOT INTRACTABLE: Primary | ICD-10-CM

## 2020-08-25 PROCEDURE — 99213 OFFICE O/P EST LOW 20 MIN: CPT | Performed by: PSYCHIATRY & NEUROLOGY

## 2020-08-25 RX ORDER — SUMATRIPTAN AND NAPROXEN SODIUM 85; 500 MG/1; MG/1
TABLET, FILM COATED ORAL
Qty: 9 TABLET | Refills: 5 | Status: SHIPPED | OUTPATIENT
Start: 2020-08-25 | End: 2020-08-25 | Stop reason: SDUPTHER

## 2020-08-25 RX ORDER — SUMATRIPTAN AND NAPROXEN SODIUM 85; 500 MG/1; MG/1
TABLET, FILM COATED ORAL
Qty: 9 TABLET | Refills: 5 | Status: SHIPPED | OUTPATIENT
Start: 2020-08-25 | End: 2021-08-27 | Stop reason: SDUPTHER

## 2020-08-25 NOTE — PROGRESS NOTES
Brice Ambrose is a 44 y o  female who returns in follow-up today with history of migraine headaches    Assessment:  1  Migraine without aura and without status migrainosus, not intractable        Plan:  Continue Treximet as needed  Follow-up 1 year    Discussion:  Magalis Zavala reports her migraines are well controlled with Treximet and continues to get them infrequently  Will continue with this  She will monitor her blood pressure and I will see her back in follow-up in 1 year      Subjective:    HPI  Magalis Zavala returns in follow-up today  She reports that since here last she has been doing well  She has cut down on her caffeine intake  She states that her migraine headaches come once or twice a month  She states when she gets it the headache goes away in less than an hour after she takes the Treximet  She notes no adverse effects from the medication  She denies any new health issues      Past Medical History:   Diagnosis Date    Fatigue     Hypertension     Migraine     Proteinuria        Family History:  Family History   Problem Relation Age of Onset    Stroke Mother     Diabetes Mother     Hypertension Mother     Kidney failure Mother     Dialysis Mother     No Known Problems Father        Past Surgical History:  Past Surgical History:   Procedure Laterality Date    ENDOMETRIAL BIOPSY  02/26/2018    FOOT SURGERY Bilateral     reconstructive    HYSTERECTOMY  09/04/2018    TONSILLECTOMY      TUBAL LIGATION         Social History:   reports that she has never smoked  She has never used smokeless tobacco  She reports that she does not drink alcohol or use drugs  Allergies:  Amoxicillin; Ciprofloxacin; Hydrocodone-acetaminophen; Influenza vaccine live; Lisinopril; Nitrofurantoin; Sulfa antibiotics; Sulfamethoxazole-trimethoprim; Influenza vaccines; Oxycodone; Quinolones;  Amlodipine; and Other      Current Outpatient Medications:     albuterol (PROVENTIL HFA,VENTOLIN HFA) 90 mcg/act inhaler, Inhale 1 puff every 6 (six) hours as needed, Disp: , Rfl:     clotrimazole (LOTRIMIN) 1 % cream, daily, Disp: , Rfl:     diphenhydrAMINE (BENADRYL) 25 mg capsule, Take 25 mg by mouth daily at bedtime as needed  , Disp: , Rfl:     meloxicam (MOBIC) 15 mg tablet, Take 1 tablet by mouth Daily, Disp: , Rfl:     nebivolol (BYSTOLIC) 20 MG tablet, Take 1 tablet (20 mg total) by mouth daily Nebivolol only!!, Disp: 90 tablet, Rfl: 3    SUMAtriptan-naproxen (TREXIMET)  MG per tablet, One p  o  at headache onset, may repeat 1 after 2 hours p r n     Maximum of 2/24 hours, Disp: 9 tablet, Rfl: 5    I have reviewed the past medical, social and family history, current medications, allergies, vitals, review of systems and updated this information as appropriate today     Objective:    Vitals:  Blood pressure (!) 150/102, pulse 64, height 5' 2 5" (1 588 m), weight 93 9 kg (207 lb)  Physical Exam    Neurological Exam  GENERAL:  Well-developed well-nourished woman in no acute distress  HEENT/NECK: Head is atraumatic normocephalic, neck is supple  NEUROLOGIC:  Mental Status: Awake and alert without aphasia  Cranial Nerves: Extraocular movements are full  Face is symmetrical  Coordination:  Gait is stable            ROS:    Review of Systems   Constitutional: Negative  Negative for appetite change and fever  HENT: Negative  Negative for hearing loss, tinnitus, trouble swallowing and voice change  Eyes: Negative  Negative for photophobia and pain  Respiratory: Negative  Negative for shortness of breath  Cardiovascular: Negative  Negative for palpitations  Gastrointestinal: Negative  Negative for nausea and vomiting  Endocrine: Negative  Negative for cold intolerance  Genitourinary: Negative  Negative for dysuria, frequency and urgency  Musculoskeletal: Positive for arthralgias  Negative for myalgias and neck pain  Left knee pain  Left hip pain   Skin: Negative  Negative for rash  Neurological: Negative  Negative for dizziness, tremors, seizures, syncope, facial asymmetry, speech difficulty, weakness, light-headedness, numbness and headaches  Hematological: Negative  Does not bruise/bleed easily  Psychiatric/Behavioral: Negative  Negative for confusion, hallucinations and sleep disturbance

## 2020-11-07 LAB
CREAT ?TM UR-SCNC: 66.4 UMOL/L
EXT PROTEIN URINE: 18.8
PROT/CREAT UR: 0.28 MG/G{CREAT}

## 2020-11-17 ENCOUNTER — OFFICE VISIT (OUTPATIENT)
Dept: NEPHROLOGY | Facility: CLINIC | Age: 50
End: 2020-11-17
Payer: COMMERCIAL

## 2020-11-17 VITALS — RESPIRATION RATE: 16 BRPM | BODY MASS INDEX: 36.86 KG/M2 | WEIGHT: 208 LBS | HEIGHT: 63 IN | TEMPERATURE: 96.6 F

## 2020-11-17 DIAGNOSIS — I10 ESSENTIAL (PRIMARY) HYPERTENSION: Primary | ICD-10-CM

## 2020-11-17 DIAGNOSIS — R80.1 PERSISTENT PROTEINURIA: ICD-10-CM

## 2020-11-17 DIAGNOSIS — I10 ESSENTIAL HYPERTENSION: ICD-10-CM

## 2020-11-17 DIAGNOSIS — G43.009 MIGRAINE WITHOUT AURA AND WITHOUT STATUS MIGRAINOSUS, NOT INTRACTABLE: ICD-10-CM

## 2020-11-17 PROCEDURE — 99214 OFFICE O/P EST MOD 30 MIN: CPT | Performed by: INTERNAL MEDICINE

## 2020-11-17 RX ORDER — NEBIVOLOL 20 MG/1
20 TABLET ORAL DAILY
Qty: 90 TABLET | Refills: 3 | Status: SHIPPED | OUTPATIENT
Start: 2020-11-17 | End: 2022-01-26 | Stop reason: SDUPTHER

## 2020-11-17 RX ORDER — NIFEDIPINE 30 MG/1
30 TABLET, FILM COATED, EXTENDED RELEASE ORAL DAILY
Qty: 30 TABLET | Refills: 3 | Status: SHIPPED | OUTPATIENT
Start: 2020-11-17 | End: 2021-05-17

## 2021-05-12 ENCOUNTER — TELEPHONE (OUTPATIENT)
Dept: NEPHROLOGY | Facility: CLINIC | Age: 51
End: 2021-05-12

## 2021-05-14 ENCOUNTER — TELEPHONE (OUTPATIENT)
Dept: NEPHROLOGY | Facility: CLINIC | Age: 51
End: 2021-05-14

## 2021-05-14 LAB
CREAT ?TM UR-SCNC: 66.4 UMOL/L
EXT PROTEIN URINE: 7.6
PROT/CREAT UR: 0.11 MG/G{CREAT}

## 2021-05-16 DIAGNOSIS — I10 ESSENTIAL (PRIMARY) HYPERTENSION: ICD-10-CM

## 2021-05-17 ENCOUNTER — OFFICE VISIT (OUTPATIENT)
Dept: NEPHROLOGY | Facility: CLINIC | Age: 51
End: 2021-05-17
Payer: COMMERCIAL

## 2021-05-17 VITALS
RESPIRATION RATE: 16 BRPM | WEIGHT: 204.4 LBS | HEIGHT: 63 IN | HEART RATE: 68 BPM | BODY MASS INDEX: 36.21 KG/M2 | TEMPERATURE: 97.7 F | DIASTOLIC BLOOD PRESSURE: 84 MMHG | SYSTOLIC BLOOD PRESSURE: 130 MMHG

## 2021-05-17 DIAGNOSIS — R80.1 PERSISTENT PROTEINURIA: ICD-10-CM

## 2021-05-17 DIAGNOSIS — G43.009 MIGRAINE WITHOUT AURA AND WITHOUT STATUS MIGRAINOSUS, NOT INTRACTABLE: ICD-10-CM

## 2021-05-17 DIAGNOSIS — I10 ESSENTIAL (PRIMARY) HYPERTENSION: Primary | ICD-10-CM

## 2021-05-17 PROCEDURE — 99214 OFFICE O/P EST MOD 30 MIN: CPT | Performed by: INTERNAL MEDICINE

## 2021-05-17 RX ORDER — NIFEDIPINE 30 MG/1
TABLET, FILM COATED, EXTENDED RELEASE ORAL
Qty: 30 TABLET | Refills: 3 | Status: SHIPPED | OUTPATIENT
Start: 2021-05-17 | End: 2022-07-05 | Stop reason: SDUPTHER

## 2021-05-17 NOTE — PROGRESS NOTES
NEPHROLOGY OFFICE FOLLOW UP  Talha Blunt 46 y o  female MRN: [id number]    Encounter: [id number] 5/17/2021    REASON FOR VISIT: Talha Blunt is a 46 y o  female who is here on 5/17/2021 for Follow-up and Hypertension    HPI:     Lila Laurent came in today for follow-up of hypertension and proteinuria  She has 46 year woman who is doing well overall  Trying to lose weight without much success     No chest pain no palpitation or shortness of breath     No nausea no vomiting no joint pain     Blood pressure is fluctuating  REVIEW OF SYSTEMS:    Review of Systems   Constitutional: Negative for activity change and fatigue  HENT: Negative for congestion and ear discharge  Eyes: Negative for photophobia and pain  Respiratory: Negative for apnea and choking  Cardiovascular: Negative for chest pain and palpitations  Gastrointestinal: Negative for abdominal distention and blood in stool  Endocrine: Negative for heat intolerance and polyphagia  Genitourinary: Negative for flank pain and urgency  Musculoskeletal: Negative for neck pain and neck stiffness  Skin: Negative for color change and wound  Allergic/Immunologic: Negative for food allergies and immunocompromised state  Neurological: Negative for seizures and facial asymmetry  Hematological: Negative for adenopathy  Does not bruise/bleed easily  Psychiatric/Behavioral: Negative for self-injury and suicidal ideas           PAST MEDICAL HISTORY:  Past Medical History:   Diagnosis Date    Fatigue     Hypertension     Migraine     Proteinuria        PAST SURGICAL HISTORY:  Past Surgical History:   Procedure Laterality Date    ENDOMETRIAL BIOPSY  02/26/2018    FOOT SURGERY Bilateral     reconstructive    HYSTERECTOMY  09/04/2018    TONSILLECTOMY      TUBAL LIGATION         SOCIAL HISTORY:  Social History     Substance and Sexual Activity   Alcohol Use No     Social History     Substance and Sexual Activity   Drug Use No Social History     Tobacco Use   Smoking Status Never Smoker   Smokeless Tobacco Never Used       FAMILY HISTORY:  Family History   Problem Relation Age of Onset    Stroke Mother     Diabetes Mother     Hypertension Mother     Kidney failure Mother     Dialysis Mother     No Known Problems Father        MEDICATIONS:    Current Outpatient Medications:     nebivolol (BYSTOLIC) 20 MG tablet, Take 1 tablet (20 mg total) by mouth daily Nebivolol only!!, Disp: 90 tablet, Rfl: 3    NIFEdipine ER (ADALAT CC) 30 MG 24 hr tablet, TAKE 1 TABLET BY MOUTH EVERY DAY, Disp: 30 tablet, Rfl: 3    SUMAtriptan-naproxen (TREXIMET)  MG per tablet, One p  o  at headache onset, may repeat 1 after 2 hours p r n     Maximum of 2/24 hours, Disp: 9 tablet, Rfl: 5    PHYSICAL EXAM:  Vitals:    05/17/21 1601   BP: 130/84   BP Location: Right arm   Patient Position: Sitting   Pulse: 68   Resp: 16   Temp: 97 7 °F (36 5 °C)   TempSrc: Temporal   Weight: 92 7 kg (204 lb 6 4 oz)   Height: 5' 3" (1 6 m)     Body mass index is 36 21 kg/m²  Physical Exam  Constitutional:       General: She is not in acute distress  Appearance: She is well-developed  She is obese  HENT:      Head: Normocephalic  Eyes:      General: No scleral icterus  Conjunctiva/sclera: Conjunctivae normal    Neck:      Musculoskeletal: Neck supple  Vascular: No JVD  Cardiovascular:      Rate and Rhythm: Normal rate  Heart sounds: Normal heart sounds  Pulmonary:      Effort: Pulmonary effort is normal       Breath sounds: No wheezing  Abdominal:      Palpations: Abdomen is soft  Tenderness: There is no abdominal tenderness  Musculoskeletal: Normal range of motion  Skin:     General: Skin is warm  Findings: No rash  Neurological:      Mental Status: She is alert and oriented to person, place, and time     Psychiatric:         Behavior: Behavior normal          LAB RESULTS:  Results for orders placed or performed in visit on 05/14/21   Protein / creatinine ratio, urine   Result Value Ref Range    PROTEIN UA 7 6     EXT Creatinine Urine 66 4     EXTERNAL Ur Prot/Creat Ratio 0 11        ASSESSMENT and PLAN:      Essential (primary) hypertension    Overall seems to be stable  She claims she may not able to get nifedipine because of  insurance  She is going to call her insurance company and does have problems she will let us know and will change it to amlodipine    Proteinuria   Quite stable  Patient cannot have ACE-inhibitor but because of anaphylaxis    Migraine without aura and without status migrainosus, not intractable   So far seems to be stable       patient seems to be stable overall  She had some question about COVID-19 vaccination  She had some allergic reaction to the flu vaccine  So she may need be careful and may require more prolonged observation  I will see her back in 1 year again  Will get blood and urine test before that visit        Portions of the record may have been created with voice recognition software  Occasional wrong word or "sound a like" substitutions may have occurred due to the inherent limitations of voice recognition software  Read the chart carefully and recognize, using context, where substitutions have occurred  If you have any questions, please contact the dictating provider

## 2021-05-17 NOTE — PATIENT INSTRUCTIONS

## 2021-05-17 NOTE — ASSESSMENT & PLAN NOTE
Overall seems to be stable  She claims she may not able to get nifedipine because of  insurance    She is going to call her insurance company and does have problems she will let us know and will change it to amlodipine

## 2021-08-27 ENCOUNTER — OFFICE VISIT (OUTPATIENT)
Dept: NEUROLOGY | Facility: CLINIC | Age: 51
End: 2021-08-27
Payer: COMMERCIAL

## 2021-08-27 VITALS
HEART RATE: 65 BPM | TEMPERATURE: 97.9 F | SYSTOLIC BLOOD PRESSURE: 120 MMHG | WEIGHT: 204.8 LBS | HEIGHT: 63 IN | BODY MASS INDEX: 36.29 KG/M2 | DIASTOLIC BLOOD PRESSURE: 82 MMHG

## 2021-08-27 DIAGNOSIS — G43.009 MIGRAINE WITHOUT AURA AND WITHOUT STATUS MIGRAINOSUS, NOT INTRACTABLE: Primary | ICD-10-CM

## 2021-08-27 PROCEDURE — 99213 OFFICE O/P EST LOW 20 MIN: CPT | Performed by: PSYCHIATRY & NEUROLOGY

## 2021-08-27 RX ORDER — SUMATRIPTAN AND NAPROXEN SODIUM 85; 500 MG/1; MG/1
TABLET, FILM COATED ORAL
Qty: 9 TABLET | Refills: 5 | Status: SHIPPED | OUTPATIENT
Start: 2021-08-27 | End: 2021-12-13

## 2021-08-27 NOTE — PROGRESS NOTES
Ignacio Leiva is a 46 y o  female  Returns in follow-up today with history of headaches    Assessment:  1  Migraine without aura and without status migrainosus, not intractable        Plan:   Treximet as needed   Follow-up 1 year    Discussion:   Holger Patten has noted an increase in headache frequency since she has been on nifedipine with headaches occurring once or twice a week instead of once or twice a month  She states she has been taking the medication every other day and finds that if she does not take it she is less likely to have a headache  She anticipates discussing this with her nephrologist and hopes to find an alternative medication  Symptoms persist she will notify me otherwise she would like to come back to see me in a year  She continues to find Treximet effective in stopping her headache and will continue this      Subjective:    HPI   Holger Patten returns in follow-up today  She reports that since here last she has  Noted an increase in headache frequency  She states she believes it is related to her new blood pressure medication, nifedipine  She states that since she started the medication she has been having more frequent headaches from 1 or 2 a month to 1 or 2 a week  She states that she has been taking the medication every other day instead of daily and finds that on the days that she does not take it she is less likely to have a headache    She does continue to find Treximet effective for her more severe headaches      Past Medical History:   Diagnosis Date    Fatigue     Hypertension     Migraine     Proteinuria        Family History:  Family History   Problem Relation Age of Onset    Stroke Mother     Diabetes Mother     Hypertension Mother     Kidney failure Mother     Dialysis Mother     No Known Problems Father        Past Surgical History:  Past Surgical History:   Procedure Laterality Date    ENDOMETRIAL BIOPSY  02/26/2018    FOOT SURGERY Bilateral     reconstructive    HYSTERECTOMY  09/04/2018    TONSILLECTOMY      TUBAL LIGATION         Social History:   reports that she has never smoked  She has never used smokeless tobacco  She reports that she does not drink alcohol and does not use drugs  Allergies:  Amoxicillin, Ciprofloxacin, Hydrocodone-acetaminophen, Influenza vaccine live, Lisinopril, Nitrofurantoin, Sulfa antibiotics, Sulfamethoxazole-trimethoprim, Influenza vaccines, Oxycodone, Quinolones, Amlodipine, and Other      Current Outpatient Medications:     nebivolol (BYSTOLIC) 20 MG tablet, Take 1 tablet (20 mg total) by mouth daily Nebivolol only!!, Disp: 90 tablet, Rfl: 3    NIFEdipine ER (ADALAT CC) 30 MG 24 hr tablet, TAKE 1 TABLET BY MOUTH EVERY DAY, Disp: 30 tablet, Rfl: 3    SUMAtriptan-naproxen (TREXIMET)  MG per tablet, One p  o  at headache onset, may repeat 1 after 2 hours p r n     Maximum of 2/24 hours, Disp: 9 tablet, Rfl: 5     I have reviewed the past medical, social and family history, current medications, allergies, vitals, review of systems and updated this information as appropriate today     Objective:    Vitals:  Blood pressure 120/82, pulse 65, temperature 97 9 °F (36 6 °C), temperature source Tympanic, height 5' 3" (1 6 m), weight 92 9 kg (204 lb 12 8 oz)  Physical Exam    Neurological Exam   GENERAL:  Well-developed well-nourished woman in no acute distress  HEENT/NECK: Head is atraumatic normocephalic, neck is supple  NEUROLOGIC:  Mental Status: Awake and alert without aphasia  Cranial Nerves: Extraocular movements are full  Face is symmetrical  Coordination:  Gait is stable            ROS:    Review of Systems   Constitutional: Negative  Negative for appetite change and fever  HENT: Negative  Negative for hearing loss, tinnitus, trouble swallowing and voice change  Eyes: Positive for visual disturbance  Negative for photophobia and pain  Respiratory: Positive for shortness of breath (after taking the meds)  Cardiovascular: Negative  Negative for palpitations  Gastrointestinal: Negative  Negative for nausea and vomiting  Endocrine: Negative  Negative for cold intolerance  Genitourinary: Negative  Negative for dysuria, frequency and urgency  Musculoskeletal: Negative  Negative for myalgias and neck pain  Skin: Negative  Negative for rash  Neurological: Positive for dizziness (with a migraine), light-headedness and headaches  Negative for tremors, seizures, syncope, facial asymmetry, speech difficulty, weakness and numbness  Hematological: Negative  Does not bruise/bleed easily  Psychiatric/Behavioral: Positive for sleep disturbance  Negative for confusion and hallucinations

## 2021-12-13 DIAGNOSIS — G43.009 MIGRAINE WITHOUT AURA AND WITHOUT STATUS MIGRAINOSUS, NOT INTRACTABLE: ICD-10-CM

## 2021-12-13 RX ORDER — SUMATRIPTAN AND NAPROXEN SODIUM 85; 500 MG/1; MG/1
TABLET, FILM COATED ORAL
Qty: 9 TABLET | Refills: 5 | Status: SHIPPED | OUTPATIENT
Start: 2021-12-13

## 2022-01-25 ENCOUNTER — APPOINTMENT (OUTPATIENT)
Dept: LAB | Facility: MEDICAL CENTER | Age: 52
End: 2022-01-25

## 2022-01-25 DIAGNOSIS — Z02.1 PRE-EMPLOYMENT HEALTH SCREENING EXAMINATION: ICD-10-CM

## 2022-01-25 LAB — RUBV IGG SERPL IA-ACNC: 85.6 IU/ML

## 2022-01-25 PROCEDURE — 86762 RUBELLA ANTIBODY: CPT

## 2022-01-25 PROCEDURE — 86735 MUMPS ANTIBODY: CPT

## 2022-01-25 PROCEDURE — 86787 VARICELLA-ZOSTER ANTIBODY: CPT

## 2022-01-25 PROCEDURE — 86765 RUBEOLA ANTIBODY: CPT

## 2022-01-25 PROCEDURE — 86480 TB TEST CELL IMMUN MEASURE: CPT

## 2022-01-25 PROCEDURE — 36415 COLL VENOUS BLD VENIPUNCTURE: CPT

## 2022-01-26 DIAGNOSIS — I10 ESSENTIAL HYPERTENSION: ICD-10-CM

## 2022-01-26 RX ORDER — NEBIVOLOL 20 MG/1
20 TABLET ORAL DAILY
Qty: 90 TABLET | Refills: 0 | Status: SHIPPED | OUTPATIENT
Start: 2022-01-26 | End: 2022-01-27 | Stop reason: SDUPTHER

## 2022-01-27 DIAGNOSIS — I10 ESSENTIAL HYPERTENSION: ICD-10-CM

## 2022-01-27 LAB — MEV IGG SER QL: ABNORMAL

## 2022-01-28 LAB
GAMMA INTERFERON BACKGROUND BLD IA-ACNC: 0.04 IU/ML
M TB IFN-G BLD-IMP: NEGATIVE
M TB IFN-G CD4+ BCKGRND COR BLD-ACNC: 0.01 IU/ML
M TB IFN-G CD4+ BCKGRND COR BLD-ACNC: 0.01 IU/ML
MITOGEN IGNF BCKGRD COR BLD-ACNC: 8.92 IU/ML
MUV IGG SER QL: NORMAL
VZV IGG SER IA-ACNC: NORMAL

## 2022-01-28 RX ORDER — NEBIVOLOL 20 MG/1
20 TABLET ORAL DAILY
Qty: 90 TABLET | Refills: 0 | Status: SHIPPED | OUTPATIENT
Start: 2022-01-28 | End: 2022-02-07 | Stop reason: SDUPTHER

## 2022-02-07 DIAGNOSIS — I10 ESSENTIAL HYPERTENSION: ICD-10-CM

## 2022-02-07 RX ORDER — NEBIVOLOL 20 MG/1
20 TABLET ORAL DAILY
Qty: 90 TABLET | Refills: 3 | Status: SHIPPED | OUTPATIENT
Start: 2022-02-07 | End: 2022-02-07 | Stop reason: SDUPTHER

## 2022-02-07 RX ORDER — NEBIVOLOL 20 MG/1
20 TABLET ORAL DAILY
Qty: 90 TABLET | Refills: 3 | Status: SHIPPED | OUTPATIENT
Start: 2022-02-07 | End: 2022-06-17 | Stop reason: SDUPTHER

## 2022-02-07 NOTE — TELEPHONE ENCOUNTER
Dr Sharona Valdes please sign another Rx for the refill  It has to go to [facility]  - [facility] in [place]

## 2022-03-24 ENCOUNTER — TELEPHONE (OUTPATIENT)
Dept: NEPHROLOGY | Facility: CLINIC | Age: 52
End: 2022-03-24

## 2022-05-31 ENCOUNTER — TELEPHONE (OUTPATIENT)
Dept: NEPHROLOGY | Facility: CLINIC | Age: 52
End: 2022-05-31

## 2022-05-31 DIAGNOSIS — R80.1 PERSISTENT PROTEINURIA: ICD-10-CM

## 2022-05-31 DIAGNOSIS — I10 ESSENTIAL HYPERTENSION: Primary | ICD-10-CM

## 2022-05-31 NOTE — TELEPHONE ENCOUNTER
Good Morning, Ayana Prasad    Patient has an appointment for 06/17/2022 patient would like to know if you can enter a new set of lab work for this visit      Thank you

## 2022-06-11 ENCOUNTER — APPOINTMENT (OUTPATIENT)
Dept: LAB | Facility: CLINIC | Age: 52
End: 2022-06-11
Payer: COMMERCIAL

## 2022-06-11 DIAGNOSIS — R80.1 PERSISTENT PROTEINURIA: ICD-10-CM

## 2022-06-11 DIAGNOSIS — I10 ESSENTIAL HYPERTENSION: ICD-10-CM

## 2022-06-11 LAB
ANION GAP SERPL CALCULATED.3IONS-SCNC: 6 MMOL/L (ref 4–13)
BACTERIA UR QL AUTO: NORMAL /HPF
BASOPHILS # BLD AUTO: 0.03 THOUSANDS/ΜL (ref 0–0.1)
BASOPHILS NFR BLD AUTO: 1 % (ref 0–1)
BILIRUB UR QL STRIP: NEGATIVE
BUN SERPL-MCNC: 17 MG/DL (ref 5–25)
CALCIUM SERPL-MCNC: 9.2 MG/DL (ref 8.3–10.1)
CHLORIDE SERPL-SCNC: 106 MMOL/L (ref 100–108)
CLARITY UR: CLEAR
CO2 SERPL-SCNC: 27 MMOL/L (ref 21–32)
COLOR UR: COLORLESS
CREAT SERPL-MCNC: 0.75 MG/DL (ref 0.6–1.3)
CREAT UR-MCNC: 36.3 MG/DL
EOSINOPHIL # BLD AUTO: 0.08 THOUSAND/ΜL (ref 0–0.61)
EOSINOPHIL NFR BLD AUTO: 2 % (ref 0–6)
ERYTHROCYTE [DISTWIDTH] IN BLOOD BY AUTOMATED COUNT: 12.2 % (ref 11.6–15.1)
GFR SERPL CREATININE-BSD FRML MDRD: 90 ML/MIN/1.73SQ M
GLUCOSE P FAST SERPL-MCNC: 100 MG/DL (ref 65–99)
GLUCOSE UR STRIP-MCNC: NEGATIVE MG/DL
HCT VFR BLD AUTO: 39.1 % (ref 34.8–46.1)
HGB BLD-MCNC: 13.1 G/DL (ref 11.5–15.4)
HGB UR QL STRIP.AUTO: NEGATIVE
IMM GRANULOCYTES # BLD AUTO: 0.01 THOUSAND/UL (ref 0–0.2)
IMM GRANULOCYTES NFR BLD AUTO: 0 % (ref 0–2)
KETONES UR STRIP-MCNC: NEGATIVE MG/DL
LEUKOCYTE ESTERASE UR QL STRIP: NEGATIVE
LYMPHOCYTES # BLD AUTO: 2.09 THOUSANDS/ΜL (ref 0.6–4.47)
LYMPHOCYTES NFR BLD AUTO: 42 % (ref 14–44)
MCH RBC QN AUTO: 29.8 PG (ref 26.8–34.3)
MCHC RBC AUTO-ENTMCNC: 33.5 G/DL (ref 31.4–37.4)
MCV RBC AUTO: 89 FL (ref 82–98)
MONOCYTES # BLD AUTO: 0.55 THOUSAND/ΜL (ref 0.17–1.22)
MONOCYTES NFR BLD AUTO: 11 % (ref 4–12)
NEUTROPHILS # BLD AUTO: 2.28 THOUSANDS/ΜL (ref 1.85–7.62)
NEUTS SEG NFR BLD AUTO: 44 % (ref 43–75)
NITRITE UR QL STRIP: NEGATIVE
NON-SQ EPI CELLS URNS QL MICRO: NORMAL /HPF
NRBC BLD AUTO-RTO: 0 /100 WBCS
PH UR STRIP.AUTO: 6 [PH]
PLATELET # BLD AUTO: 178 THOUSANDS/UL (ref 149–390)
PMV BLD AUTO: 12.2 FL (ref 8.9–12.7)
POTASSIUM SERPL-SCNC: 4.1 MMOL/L (ref 3.5–5.3)
PROT UR STRIP-MCNC: NEGATIVE MG/DL
PROT UR-MCNC: <6 MG/DL
PROT/CREAT UR: <0.17 MG/G{CREAT} (ref 0–0.1)
RBC # BLD AUTO: 4.4 MILLION/UL (ref 3.81–5.12)
RBC #/AREA URNS AUTO: NORMAL /HPF
SODIUM SERPL-SCNC: 139 MMOL/L (ref 136–145)
SP GR UR STRIP.AUTO: 1.01 (ref 1–1.03)
UROBILINOGEN UR STRIP-ACNC: <2 MG/DL
WBC # BLD AUTO: 5.04 THOUSAND/UL (ref 4.31–10.16)
WBC #/AREA URNS AUTO: NORMAL /HPF

## 2022-06-11 PROCEDURE — 84156 ASSAY OF PROTEIN URINE: CPT

## 2022-06-11 PROCEDURE — 82570 ASSAY OF URINE CREATININE: CPT

## 2022-06-11 PROCEDURE — 36415 COLL VENOUS BLD VENIPUNCTURE: CPT

## 2022-06-11 PROCEDURE — 80048 BASIC METABOLIC PNL TOTAL CA: CPT

## 2022-06-11 PROCEDURE — 85025 COMPLETE CBC W/AUTO DIFF WBC: CPT

## 2022-06-11 PROCEDURE — 81001 URINALYSIS AUTO W/SCOPE: CPT

## 2022-06-17 ENCOUNTER — OFFICE VISIT (OUTPATIENT)
Dept: NEPHROLOGY | Facility: CLINIC | Age: 52
End: 2022-06-17
Payer: COMMERCIAL

## 2022-06-17 VITALS
SYSTOLIC BLOOD PRESSURE: 140 MMHG | HEART RATE: 61 BPM | WEIGHT: 207 LBS | DIASTOLIC BLOOD PRESSURE: 90 MMHG | BODY MASS INDEX: 34.49 KG/M2 | HEIGHT: 65 IN | TEMPERATURE: 97.5 F | OXYGEN SATURATION: 97 % | RESPIRATION RATE: 16 BRPM

## 2022-06-17 DIAGNOSIS — I10 ESSENTIAL HYPERTENSION: ICD-10-CM

## 2022-06-17 DIAGNOSIS — I10 ESSENTIAL (PRIMARY) HYPERTENSION: ICD-10-CM

## 2022-06-17 DIAGNOSIS — R80.1 PERSISTENT PROTEINURIA: Primary | ICD-10-CM

## 2022-06-17 PROCEDURE — 99214 OFFICE O/P EST MOD 30 MIN: CPT | Performed by: INTERNAL MEDICINE

## 2022-06-17 RX ORDER — NEBIVOLOL 20 MG/1
20 TABLET ORAL DAILY
Qty: 90 TABLET | Refills: 4 | Status: CANCELLED | OUTPATIENT
Start: 2022-06-17

## 2022-06-17 RX ORDER — NEBIVOLOL 20 MG/1
20 TABLET ORAL DAILY
Qty: 90 TABLET | Refills: 3 | Status: SHIPPED | OUTPATIENT
Start: 2022-06-17

## 2022-06-17 NOTE — PROGRESS NOTES
NEPHROLOGY OFFICE FOLLOW UP  Marlyn Burrell 45 y o  female MRN: [id number]    Encounter: [id number] 6/17/2022    REASON FOR VISIT: Marlyn Burrell is a 45 y o  female who is here on 6/17/2022 for Follow-up and Hypertension    HPI:    During ischemic true follow-up of hypertension and proteinuria  56-year-old woman who is doing well oral    She denies any complaints he does get migraine  She was on nifedipine for blood pressure which stopped it because of recurrent migraine    Denies any joint pain     No abdominal pain     No nausea no vomiting      REVIEW OF SYSTEMS:    Review of Systems   Constitutional: Negative for activity change and fatigue  HENT: Negative for congestion and ear discharge  Eyes: Negative for photophobia and pain  Respiratory: Negative for apnea and choking  Cardiovascular: Negative for chest pain and palpitations  Gastrointestinal: Negative for abdominal distention and blood in stool  Endocrine: Negative for heat intolerance and polyphagia  Genitourinary: Negative for flank pain and urgency  Musculoskeletal: Negative for neck pain and neck stiffness  Skin: Negative for color change and wound  Allergic/Immunologic: Negative for food allergies and immunocompromised state  Neurological: Negative for seizures and facial asymmetry  Hematological: Negative for adenopathy  Does not bruise/bleed easily  Psychiatric/Behavioral: Negative for self-injury and suicidal ideas           PAST MEDICAL HISTORY:  Past Medical History:   Diagnosis Date    Fatigue     Hypertension     Migraine     Proteinuria        PAST SURGICAL HISTORY:  Past Surgical History:   Procedure Laterality Date    ENDOMETRIAL BIOPSY  02/26/2018    FOOT SURGERY Bilateral     reconstructive    HYSTERECTOMY  09/04/2018    TONSILLECTOMY      TUBAL LIGATION         SOCIAL HISTORY:  Social History     Substance and Sexual Activity   Alcohol Use No     Social History     Substance and Sexual Activity   Drug Use No     Social History     Tobacco Use   Smoking Status Never Smoker   Smokeless Tobacco Never Used       FAMILY HISTORY:  Family History   Problem Relation Age of Onset    Stroke Mother     Diabetes Mother     Hypertension Mother     Kidney failure Mother     Dialysis Mother     No Known Problems Father        MEDICATIONS:    Current Outpatient Medications:     nebivolol (BYSTOLIC) 20 MG tablet, Take 1 tablet (20 mg total) by mouth daily Nebivolol only!!, Disp: 90 tablet, Rfl: 3    SUMAtriptan-naproxen (TREXIMET)  MG per tablet, TAKE ONE TABLET AT HEADACHE ONSET, MAY REPEAT 1 AFTER 2 HOURS AS NEEDED   MAXIMUM OF 2/24 HOURS, Disp: 9 tablet, Rfl: 5    NIFEdipine ER (ADALAT CC) 30 MG 24 hr tablet, TAKE 1 TABLET BY MOUTH EVERY DAY (Patient not taking: No sig reported), Disp: 30 tablet, Rfl: 3    PHYSICAL EXAM:  Vitals:    06/17/22 0855   BP: 140/90   BP Location: Right arm   Patient Position: Sitting   Pulse: 61   Resp: 16   Temp: 97 5 °F (36 4 °C)   TempSrc: Temporal   SpO2: 97%   Weight: 93 9 kg (207 lb)   Height: 5' 5" (1 651 m)     Body mass index is 34 45 kg/m²  Physical Exam  Constitutional:       General: She is not in acute distress  Appearance: She is well-developed  She is obese  HENT:      Head: Normocephalic  Eyes:      General: No scleral icterus  Conjunctiva/sclera: Conjunctivae normal    Neck:      Vascular: No JVD  Cardiovascular:      Rate and Rhythm: Normal rate  Heart sounds: Normal heart sounds  Pulmonary:      Effort: Pulmonary effort is normal       Breath sounds: No wheezing  Abdominal:      Palpations: Abdomen is soft  Tenderness: There is no abdominal tenderness  Musculoskeletal:         General: Normal range of motion  Cervical back: Neck supple  Skin:     General: Skin is warm  Findings: No rash  Neurological:      Mental Status: She is alert and oriented to person, place, and time     Psychiatric: Behavior: Behavior normal          LAB RESULTS:  Results for orders placed or performed in visit on 62/25/04   Basic metabolic panel   Result Value Ref Range    Sodium 139 136 - 145 mmol/L    Potassium 4 1 3 5 - 5 3 mmol/L    Chloride 106 100 - 108 mmol/L    CO2 27 21 - 32 mmol/L    ANION GAP 6 4 - 13 mmol/L    BUN 17 5 - 25 mg/dL    Creatinine 0 75 0 60 - 1 30 mg/dL    Glucose, Fasting 100 (H) 65 - 99 mg/dL    Calcium 9 2 8 3 - 10 1 mg/dL    eGFR 90 ml/min/1 73sq m   CBC and differential   Result Value Ref Range    WBC 5 04 4 31 - 10 16 Thousand/uL    RBC 4 40 3 81 - 5 12 Million/uL    Hemoglobin 13 1 11 5 - 15 4 g/dL    Hematocrit 39 1 34 8 - 46 1 %    MCV 89 82 - 98 fL    MCH 29 8 26 8 - 34 3 pg    MCHC 33 5 31 4 - 37 4 g/dL    RDW 12 2 11 6 - 15 1 %    MPV 12 2 8 9 - 12 7 fL    Platelets 647 163 - 571 Thousands/uL    nRBC 0 /100 WBCs    Neutrophils Relative 44 43 - 75 %    Immat GRANS % 0 0 - 2 %    Lymphocytes Relative 42 14 - 44 %    Monocytes Relative 11 4 - 12 %    Eosinophils Relative 2 0 - 6 %    Basophils Relative 1 0 - 1 %    Neutrophils Absolute 2 28 1 85 - 7 62 Thousands/µL    Immature Grans Absolute 0 01 0 00 - 0 20 Thousand/uL    Lymphocytes Absolute 2 09 0 60 - 4 47 Thousands/µL    Monocytes Absolute 0 55 0 17 - 1 22 Thousand/µL    Eosinophils Absolute 0 08 0 00 - 0 61 Thousand/µL    Basophils Absolute 0 03 0 00 - 0 10 Thousands/µL       ASSESSMENT and PLAN:      Essential (primary) hypertension  Not very well controlled  I discussed with her about monitoring at home and reducing weight  I prefer blood pressure 130/80 or less  She is allergic to multiple medications that make it difficult to treat her    Proteinuria  Seems to be in remission  We will continue to monitor    Everything discussed with the patient at length  I will see her back in 1 year  She will let me know by the blood pressure monitoring  She will also try to lose weight    Advised to go with [facility]          Portions of the record may have been created with voice recognition software  Occasional wrong word or "sound a like" substitutions may have occurred due to the inherent limitations of voice recognition software  Read the chart carefully and recognize, using context, where substitutions have occurred  If you have any questions, please contact the dictating provider

## 2022-06-17 NOTE — ASSESSMENT & PLAN NOTE
Not very well controlled  I discussed with her about monitoring at home and reducing weight  I prefer blood pressure 130/80 or less    She is allergic to multiple medications that make it difficult to treat her

## 2022-07-05 DIAGNOSIS — I10 ESSENTIAL (PRIMARY) HYPERTENSION: ICD-10-CM

## 2022-07-05 RX ORDER — NIFEDIPINE 30 MG/1
30 TABLET, FILM COATED, EXTENDED RELEASE ORAL DAILY
Qty: 30 TABLET | Refills: 0 | Status: SHIPPED | OUTPATIENT
Start: 2022-07-05 | End: 2022-08-29

## 2022-08-09 DIAGNOSIS — Z11.52 ENCOUNTER FOR SCREENING FOR COVID-19: Primary | ICD-10-CM

## 2022-08-28 DIAGNOSIS — I10 ESSENTIAL (PRIMARY) HYPERTENSION: ICD-10-CM

## 2022-08-29 DIAGNOSIS — I10 ESSENTIAL (PRIMARY) HYPERTENSION: ICD-10-CM

## 2022-08-29 RX ORDER — NIFEDIPINE 30 MG/1
30 TABLET, FILM COATED, EXTENDED RELEASE ORAL DAILY
Qty: 90 TABLET | Refills: 2 | Status: SHIPPED | OUTPATIENT
Start: 2022-08-29

## 2022-08-29 RX ORDER — NIFEDIPINE 30 MG/1
TABLET, FILM COATED, EXTENDED RELEASE ORAL
Qty: 30 TABLET | Refills: 0 | Status: SHIPPED | OUTPATIENT
Start: 2022-08-29 | End: 2022-08-29

## 2022-09-30 ENCOUNTER — OFFICE VISIT (OUTPATIENT)
Dept: FAMILY MEDICINE CLINIC | Facility: CLINIC | Age: 52
End: 2022-09-30
Payer: COMMERCIAL

## 2022-09-30 VITALS
DIASTOLIC BLOOD PRESSURE: 90 MMHG | HEART RATE: 72 BPM | OXYGEN SATURATION: 100 % | WEIGHT: 209.2 LBS | SYSTOLIC BLOOD PRESSURE: 136 MMHG | BODY MASS INDEX: 34.85 KG/M2 | TEMPERATURE: 97 F | HEIGHT: 65 IN

## 2022-09-30 DIAGNOSIS — E66.09 CLASS 1 OBESITY DUE TO EXCESS CALORIES WITHOUT SERIOUS COMORBIDITY WITH BODY MASS INDEX (BMI) OF 34.0 TO 34.9 IN ADULT: ICD-10-CM

## 2022-09-30 DIAGNOSIS — Z00.00 HEALTHCARE MAINTENANCE: ICD-10-CM

## 2022-09-30 DIAGNOSIS — G43.009 MIGRAINE WITHOUT AURA AND WITHOUT STATUS MIGRAINOSUS, NOT INTRACTABLE: ICD-10-CM

## 2022-09-30 DIAGNOSIS — I10 ESSENTIAL (PRIMARY) HYPERTENSION: ICD-10-CM

## 2022-09-30 DIAGNOSIS — Z76.89 ENCOUNTER TO ESTABLISH CARE: Primary | ICD-10-CM

## 2022-09-30 DIAGNOSIS — E55.9 VITAMIN D DEFICIENCY: ICD-10-CM

## 2022-09-30 DIAGNOSIS — R63.5 WEIGHT GAIN: ICD-10-CM

## 2022-09-30 DIAGNOSIS — Z12.31 ENCOUNTER FOR SCREENING MAMMOGRAM FOR MALIGNANT NEOPLASM OF BREAST: ICD-10-CM

## 2022-09-30 DIAGNOSIS — Z78.9 VEGETARIAN DIET: ICD-10-CM

## 2022-09-30 DIAGNOSIS — I10 ESSENTIAL HYPERTENSION: ICD-10-CM

## 2022-09-30 DIAGNOSIS — I83.93 VARICOSE VEINS OF BOTH LOWER EXTREMITIES, UNSPECIFIED WHETHER COMPLICATED: ICD-10-CM

## 2022-09-30 DIAGNOSIS — Z12.11 SCREEN FOR COLON CANCER: ICD-10-CM

## 2022-09-30 PROBLEM — Z80.3 FAMILY HISTORY OF BREAST CANCER: Status: ACTIVE | Noted: 2022-09-30

## 2022-09-30 PROBLEM — Z90.710 STATUS POST TOTAL HYSTERECTOMY: Status: ACTIVE | Noted: 2022-09-30

## 2022-09-30 PROBLEM — E66.811 CLASS 1 OBESITY DUE TO EXCESS CALORIES WITH BODY MASS INDEX (BMI) OF 34.0 TO 34.9 IN ADULT: Status: ACTIVE | Noted: 2022-09-30

## 2022-09-30 PROCEDURE — 99204 OFFICE O/P NEW MOD 45 MIN: CPT

## 2022-09-30 RX ORDER — NEBIVOLOL 20 MG/1
20 TABLET ORAL DAILY
Qty: 90 TABLET | Refills: 3 | Status: SHIPPED | OUTPATIENT
Start: 2022-09-30

## 2022-09-30 NOTE — ASSESSMENT & PLAN NOTE
Colonoscopy- ordered today  Mammogram- scheduled 12/23/22  Influenza- deferred due to allergic reaction  Pap/HPV- total hysterectomy

## 2022-09-30 NOTE — PROGRESS NOTES
BMI Counseling: Body mass index is 34 81 kg/m²  The BMI is above normal  Nutrition recommendations include decreasing portion sizes, encouraging healthy choices of fruits and vegetables, decreasing fast food intake, consuming healthier snacks, limiting drinks that contain sugar, moderation in carbohydrate intake, increasing intake of lean protein, reducing intake of saturated and trans fat and reducing intake of cholesterol  Exercise recommendations include moderate physical activity 150 minutes/week and exercising 3-5 times per week  Rationale for BMI follow-up plan is due to patient being overweight or obese  Depression Screening and Follow-up Plan: Patient was screened for depression during today's encounter  They screened negative with a PHQ-2 score of 0  Assessment/Plan:         Problem List Items Addressed This Visit        Cardiovascular and Mediastinum    Essential (primary) hypertension     -borderline today- continue to take medications prescribed, blood work ordered  Limit salt intake         Relevant Medications    nebivolol (BYSTOLIC) 20 MG tablet    Other Relevant Orders    CBC and differential    Comprehensive metabolic panel    Hemoglobin A1C    Lipid panel    Migraine without aura and without status migrainosus, not intractable     Controlled on current medication treatment         Relevant Medications    nebivolol (BYSTOLIC) 20 MG tablet    Varicose veins of both lower extremities     History of the same, make an appointment with vascular doctor  Relevant Orders    Ambulatory Referral to Vascular Surgery       Other    Vitamin D deficiency     Blood work ordered         Relevant Orders    Vitamin D 25 hydroxy    Vegetarian diet     For about 10 years  Blood work ordered         Relevant Orders    Vitamin B12    Weight gain     Lifestyle changes discussed as well medications, she will make an appointment in order to discuss this further            Class 1 obesity due to excess calories with body mass index (BMI) of 34 0 to 34 9 in adult    Healthcare maintenance     Colonoscopy- ordered today  Mammogram- scheduled 12/23/22  Influenza- deferred due to allergic reaction  Pap/HPV- total hysterectomy           Relevant Orders    CBC and differential    Comprehensive metabolic panel    Hemoglobin A1C    Lipid panel    Vitamin D 25 hydroxy    TSH, 3rd generation with Free T4 reflex    Vitamin B12      Other Visit Diagnoses     Encounter to establish care    -  Primary    Essential hypertension        Relevant Medications    nebivolol (BYSTOLIC) 20 MG tablet    Encounter for screening mammogram for malignant neoplasm of breast        Relevant Orders    Mammo screening bilateral w 3d & cad    Screen for colon cancer        Relevant Orders    Ambulatory referral for colonoscopy            Subjective:      Patient ID: Joi Riggins is a 45 y o  female  Patient presents to the office in order to establish care  She has been taking all of her medications as prescribed and denies any adverse effects  She does history of hypertension, migraines, proteinuria, and varicose veins  She is denying any chest pain, palpitations, shortness of breath or dizziness  The following portions of the patient's history were reviewed and updated as appropriate:   Past Medical History:  She has a past medical history of Fatigue, Hypertension, Migraine, and Proteinuria ,  _______________________________________________________________________  Medical Problems:  does not have any pertinent problems on file ,  _______________________________________________________________________  Past Surgical History:   has a past surgical history that includes Tubal ligation; Endometrial biopsy (02/26/2018); Tonsillectomy; Foot surgery (Bilateral); and Hysterectomy (09/04/2018)  ,  _______________________________________________________________________  Family History:  family history includes Diabetes in her mother; Dialysis in her mother; Hypertension in her mother; Kidney failure in her mother; No Known Problems in her father; Stroke in her mother ,  _______________________________________________________________________  Social History:   reports that she has never smoked  She has never used smokeless tobacco  She reports that she does not drink alcohol and does not use drugs  ,  _______________________________________________________________________  Allergies:  is allergic to amoxicillin, ciprofloxacin, hydrocodone-acetaminophen, influenza vaccine live, lisinopril, nitrofurantoin, sulfa antibiotics, sulfamethoxazole-trimethoprim, influenza vaccines, oxycodone, quinolones, amlodipine, and other     _______________________________________________________________________  Current Outpatient Medications   Medication Sig Dispense Refill    nebivolol (BYSTOLIC) 20 MG tablet Take 1 tablet (20 mg total) by mouth daily Nebivolol only!! 90 tablet 3    NIFEdipine ER (ADALAT CC) 30 MG 24 hr tablet Take 1 tablet (30 mg total) by mouth daily 90 tablet 2    SUMAtriptan-naproxen (TREXIMET)  MG per tablet TAKE ONE TABLET AT HEADACHE ONSET, MAY REPEAT 1 AFTER 2 HOURS AS NEEDED   MAXIMUM OF 2/24 HOURS 9 tablet 5     No current facility-administered medications for this visit      _______________________________________________________________________  Review of Systems   Constitutional: Negative for chills and fever  HENT: Negative for ear pain and sore throat  Eyes: Negative for pain and visual disturbance  Respiratory: Negative for cough and shortness of breath  Cardiovascular: Positive for leg swelling (Minimal)  Negative for chest pain and palpitations  Gastrointestinal: Negative for abdominal pain and vomiting  Genitourinary: Negative for dysuria and hematuria  Musculoskeletal: Negative for arthralgias and back pain  Skin: Negative for color change and rash     Neurological: Negative for seizures and syncope  All other systems reviewed and are negative  Objective:  Vitals:    09/30/22 0704   BP: 136/90   BP Location: Left arm   Patient Position: Sitting   Pulse: 72   Temp: (!) 97 °F (36 1 °C)   TempSrc: Tympanic   SpO2: 100%   Weight: 94 9 kg (209 lb 3 2 oz)   Height: 5' 5" (1 651 m)     Body mass index is 34 81 kg/m²  Physical Exam  Vitals and nursing note reviewed  Constitutional:       General: She is not in acute distress  Appearance: Normal appearance  She is obese  She is not ill-appearing  Cardiovascular:      Rate and Rhythm: Normal rate and regular rhythm  Pulses: Normal pulses  Heart sounds: Normal heart sounds  Pulmonary:      Effort: Pulmonary effort is normal  No respiratory distress  Breath sounds: Normal breath sounds  No wheezing  Musculoskeletal:         General: Normal range of motion  Skin:     General: Skin is warm and dry  Comments: Varicose veins   Neurological:      Mental Status: She is alert and oriented to person, place, and time  Psychiatric:         Mood and Affect: Mood normal          Behavior: Behavior normal          Thought Content:  Thought content normal          Judgment: Judgment normal

## 2022-09-30 NOTE — PATIENT INSTRUCTIONS
Dietary changes     Highly processed, calorie-dense, nutrient-depleted diet favored in the current [place] culture frequently leads to exaggerated post meal spikes in blood glucose, triglycerides and lipids  The temporary increase in free radicals triggers inflammation, endothelial dysfunction, hypercoagulability, and sympathetic hyperactivity  Such state is an independent predictor of future cardiovascular events even in nondiabetic individuals  What changes to implement: diet high in minimally processed, high-fiber, plant-based foods such as vegetables and fruits, whole grains, legumes, and nuts will markedly decrease the post-meal increase in glucose, triglycerides, and inflammation  Additionally, lean protein, vinegar, fish oil, tea, cinnamon, calorie restriction, weight loss, exercise, and low-dose to moderate-dose alcohol each positively impact post meal spikes in blood glucose, triglycerides and lipids  Studies indicate that eating patterns, such as the traditional Mediterranean or [place] diets, that incorporate these types of foods and beverages reduce inflammation and cardiovascular risk  This anti-inflammatory diet should be considered for the primary and secondary prevention of coronary artery disease and diabetes  Steps to Improve Post-Prandial Glucose and Triglycerides  1  Choose high-fiber, low glycemic index carbohydrates such as whole grains, legumes, and vegetables and fruits  2  Eat lean protein at all 3 meals  3  Consume nuts on a daily basis, about 1 handful (with a closed fist)  Eat with vegetables, berries or other fruits, or grains  4  Eat a salad of leafy greens dressed with vinegar and virgin olive oil on a daily basis  5  Avoid highly processed foods and drinks, especially those containing sugar, high-fructose corn syrup, white flour, or trans fats     6  Keep serving sizes modest    7  Avoid being overweight or obese; maintain a waist circumference less than one-half of height in inches  8  Obtain 30 min or more of daily physical activity of at least moderate intensity  9  Consider consuming 1 alcoholic drink before or with the evening meal (for those without a history of substance abuse)         https://doi org/10 1016/j jacc  [phone]

## 2022-09-30 NOTE — ASSESSMENT & PLAN NOTE
Lifestyle changes discussed as well medications, she will make an appointment in order to discuss this further

## 2022-10-08 ENCOUNTER — APPOINTMENT (OUTPATIENT)
Dept: LAB | Facility: CLINIC | Age: 52
End: 2022-10-08
Payer: COMMERCIAL

## 2022-10-08 DIAGNOSIS — Z00.00 HEALTHCARE MAINTENANCE: ICD-10-CM

## 2022-10-08 DIAGNOSIS — E55.9 VITAMIN D DEFICIENCY: ICD-10-CM

## 2022-10-08 DIAGNOSIS — Z78.9 VEGETARIAN DIET: ICD-10-CM

## 2022-10-08 DIAGNOSIS — I10 ESSENTIAL (PRIMARY) HYPERTENSION: ICD-10-CM

## 2022-10-08 LAB
25(OH)D3 SERPL-MCNC: 31.5 NG/ML (ref 30–100)
ALBUMIN SERPL BCP-MCNC: 3.4 G/DL (ref 3.5–5)
ALP SERPL-CCNC: 87 U/L (ref 46–116)
ALT SERPL W P-5'-P-CCNC: 26 U/L (ref 12–78)
ANION GAP SERPL CALCULATED.3IONS-SCNC: 4 MMOL/L (ref 4–13)
AST SERPL W P-5'-P-CCNC: 13 U/L (ref 5–45)
BASOPHILS # BLD AUTO: 0.03 THOUSANDS/ΜL (ref 0–0.1)
BASOPHILS NFR BLD AUTO: 1 % (ref 0–1)
BILIRUB SERPL-MCNC: 0.64 MG/DL (ref 0.2–1)
BUN SERPL-MCNC: 16 MG/DL (ref 5–25)
CALCIUM ALBUM COR SERPL-MCNC: 9.7 MG/DL (ref 8.3–10.1)
CALCIUM SERPL-MCNC: 9.2 MG/DL (ref 8.3–10.1)
CHLORIDE SERPL-SCNC: 107 MMOL/L (ref 96–108)
CHOLEST SERPL-MCNC: 212 MG/DL
CO2 SERPL-SCNC: 27 MMOL/L (ref 21–32)
CREAT SERPL-MCNC: 0.76 MG/DL (ref 0.6–1.3)
EOSINOPHIL # BLD AUTO: 0.12 THOUSAND/ΜL (ref 0–0.61)
EOSINOPHIL NFR BLD AUTO: 2 % (ref 0–6)
ERYTHROCYTE [DISTWIDTH] IN BLOOD BY AUTOMATED COUNT: 12.2 % (ref 11.6–15.1)
EST. AVERAGE GLUCOSE BLD GHB EST-MCNC: 111 MG/DL
GFR SERPL CREATININE-BSD FRML MDRD: 89 ML/MIN/1.73SQ M
GLUCOSE P FAST SERPL-MCNC: 107 MG/DL (ref 65–99)
HBA1C MFR BLD: 5.5 %
HCT VFR BLD AUTO: 39.7 % (ref 34.8–46.1)
HDLC SERPL-MCNC: 44 MG/DL
HGB BLD-MCNC: 13 G/DL (ref 11.5–15.4)
IMM GRANULOCYTES # BLD AUTO: 0.02 THOUSAND/UL (ref 0–0.2)
IMM GRANULOCYTES NFR BLD AUTO: 0 % (ref 0–2)
LDLC SERPL CALC-MCNC: 129 MG/DL (ref 0–100)
LYMPHOCYTES # BLD AUTO: 2.21 THOUSANDS/ΜL (ref 0.6–4.47)
LYMPHOCYTES NFR BLD AUTO: 42 % (ref 14–44)
MCH RBC QN AUTO: 29.5 PG (ref 26.8–34.3)
MCHC RBC AUTO-ENTMCNC: 32.7 G/DL (ref 31.4–37.4)
MCV RBC AUTO: 90 FL (ref 82–98)
MONOCYTES # BLD AUTO: 0.58 THOUSAND/ΜL (ref 0.17–1.22)
MONOCYTES NFR BLD AUTO: 11 % (ref 4–12)
NEUTROPHILS # BLD AUTO: 2.35 THOUSANDS/ΜL (ref 1.85–7.62)
NEUTS SEG NFR BLD AUTO: 44 % (ref 43–75)
NONHDLC SERPL-MCNC: 168 MG/DL
NRBC BLD AUTO-RTO: 0 /100 WBCS
PLATELET # BLD AUTO: 174 THOUSANDS/UL (ref 149–390)
PMV BLD AUTO: 14 FL (ref 8.9–12.7)
POTASSIUM SERPL-SCNC: 4.3 MMOL/L (ref 3.5–5.3)
PROT SERPL-MCNC: 7.3 G/DL (ref 6.4–8.4)
RBC # BLD AUTO: 4.41 MILLION/UL (ref 3.81–5.12)
SODIUM SERPL-SCNC: 138 MMOL/L (ref 135–147)
TRIGL SERPL-MCNC: 195 MG/DL
TSH SERPL DL<=0.05 MIU/L-ACNC: 2.68 UIU/ML (ref 0.45–4.5)
VIT B12 SERPL-MCNC: 469 PG/ML (ref 100–900)
WBC # BLD AUTO: 5.31 THOUSAND/UL (ref 4.31–10.16)

## 2022-10-08 PROCEDURE — 85025 COMPLETE CBC W/AUTO DIFF WBC: CPT

## 2022-10-08 PROCEDURE — 84443 ASSAY THYROID STIM HORMONE: CPT

## 2022-10-08 PROCEDURE — 82607 VITAMIN B-12: CPT

## 2022-10-08 PROCEDURE — 80061 LIPID PANEL: CPT

## 2022-10-08 PROCEDURE — 80053 COMPREHEN METABOLIC PANEL: CPT

## 2022-10-08 PROCEDURE — 83036 HEMOGLOBIN GLYCOSYLATED A1C: CPT

## 2022-10-08 PROCEDURE — 36415 COLL VENOUS BLD VENIPUNCTURE: CPT

## 2022-10-08 PROCEDURE — 82306 VITAMIN D 25 HYDROXY: CPT

## 2022-10-14 ENCOUNTER — OFFICE VISIT (OUTPATIENT)
Dept: FAMILY MEDICINE CLINIC | Facility: CLINIC | Age: 52
End: 2022-10-14
Payer: COMMERCIAL

## 2022-10-14 VITALS
DIASTOLIC BLOOD PRESSURE: 90 MMHG | TEMPERATURE: 97.6 F | BODY MASS INDEX: 35.01 KG/M2 | OXYGEN SATURATION: 96 % | WEIGHT: 210.4 LBS | RESPIRATION RATE: 16 BRPM | HEART RATE: 74 BPM | SYSTOLIC BLOOD PRESSURE: 130 MMHG

## 2022-10-14 DIAGNOSIS — R63.5 WEIGHT GAIN: ICD-10-CM

## 2022-10-14 DIAGNOSIS — E78.01 FAMILIAL HYPERCHOLESTEROLEMIA: ICD-10-CM

## 2022-10-14 DIAGNOSIS — I10 ESSENTIAL (PRIMARY) HYPERTENSION: ICD-10-CM

## 2022-10-14 DIAGNOSIS — Z00.00 HEALTHCARE MAINTENANCE: ICD-10-CM

## 2022-10-14 DIAGNOSIS — F51.01 PRIMARY INSOMNIA: ICD-10-CM

## 2022-10-14 DIAGNOSIS — R80.1 PERSISTENT PROTEINURIA: ICD-10-CM

## 2022-10-14 DIAGNOSIS — Z00.00 ANNUAL PHYSICAL EXAM: Primary | ICD-10-CM

## 2022-10-14 PROCEDURE — 99396 PREV VISIT EST AGE 40-64: CPT

## 2022-10-14 RX ORDER — TOPIRAMATE 50 MG/1
50 TABLET, FILM COATED ORAL DAILY
Qty: 30 TABLET | Refills: 1 | Status: SHIPPED | OUTPATIENT
Start: 2022-10-14 | End: 2022-10-14

## 2022-10-14 RX ORDER — TOPIRAMATE 50 MG/1
50 TABLET, FILM COATED ORAL
Qty: 30 TABLET | Refills: 1 | Status: SHIPPED | OUTPATIENT
Start: 2022-10-14

## 2022-10-14 NOTE — PROGRESS NOTES
[facility] [address] [place] [address]    NAME: Sapphire Baugh  AGE: 45 y o  SEX: female  : 1970     DATE: 10/14/2022     Assessment and Plan:     Problem List Items Addressed This Visit        Cardiovascular and Mediastinum    Essential (primary) hypertension     States that she has always had diastolic in her 56P, pressure medication managed by Dr Neha Mcgarry   Continue to take medications as prescribed, continue to check your blood pressure daily  Will work weight loss  Other    Proteinuria     Continue to see Dr Neha Mcgarry annually         Weight gain     Continue to make healthy food choices and stay active  Will start on Topamax 25 mg for the 1st week and then 50 thereafter  Return in 4 weeks         Relevant Medications    topiramate (Topamax) 50 MG tablet    Adult BMI 35 0-35 9 kg/sq m    Relevant Medications    topiramate (Topamax) 50 MG tablet    Healthcare maintenance     Will make an appointment for colonoscopy in about month due to work schedule  Familial hypercholesterolemia     Start taking 1000 mg fish oil daily         Primary insomnia     Discussed sleep hygiene and natural supplements to try for insomnia  Discussed relaxation and self-care is important for sleep  Written information provided  Other Visit Diagnoses     Annual physical exam    -  Primary    Blood work reviewed          Immunizations and preventive care screenings were discussed with patient today  Appropriate education was printed on patient's after visit summary  Counseling:  Alcohol/drug use: discussed moderation in alcohol intake, the recommendations for healthy alcohol use, and avoidance of illicit drug use  Dental Health: discussed importance of regular tooth brushing, flossing, and dental visits    Injury prevention: discussed safety/seat belts, safety helmets, smoke detectors, carbon dioxide detectors, and smoking near bedding or upholstery  Sexual health: discussed sexually transmitted diseases, partner selection, use of condoms, avoidance of unintended pregnancy, and contraceptive alternatives  Exercise: the importance of regular exercise/physical activity was discussed  Recommend exercise 3-5 times per week for at least 30 minutes  Return in 4 weeks (on 11/11/2022) for Weight loss follow up  Chief Complaint:     Chief Complaint   Patient presents with   • Follow-up     2 week f/u       History of Present Illness:     Adult Annual Physical   Patient here for a comprehensive physical exam  The patient reports no problems  Diet and Physical Activity  Diet/Nutrition: well balanced diet  Exercise: no formal exercise  Depression Screening  PHQ-2/9 Depression Screening         General Health  Sleep: sleeps poorly and gets 4-6 hours of sleep on average  Hearing: normal - bilateral   Vision: goes for regular eye exams, most recent eye exam <1 year ago and wears glasses  Glasses for distance   Dental: regular dental visits and brushes teeth twice daily  /GYN Health  Patient is: hysterectomy 2019  Last menstrual period: 2019  Contraceptive method: none  Review of Systems:     Review of Systems   Constitutional: Negative for chills and fever  HENT: Negative for ear pain and sore throat  Eyes: Negative for pain and visual disturbance  Respiratory: Negative for cough and shortness of breath  Cardiovascular: Negative for chest pain and palpitations  Gastrointestinal: Negative for abdominal pain and vomiting  Genitourinary: Negative for dysuria and hematuria  Musculoskeletal: Negative for arthralgias and back pain  Skin: Negative for color change and rash  Neurological: Negative for seizures and syncope  Psychiatric/Behavioral: Positive for sleep disturbance  All other systems reviewed and are negative       Past Medical History:     Past Medical History:   Diagnosis Date   • Fatigue    • Hypertension    • Migraine    • Proteinuria       Past Surgical History:     Past Surgical History:   Procedure Laterality Date   • ENDOMETRIAL BIOPSY  02/26/2018   • FOOT SURGERY Bilateral     reconstructive   • HYSTERECTOMY  09/04/2018   • TONSILLECTOMY     • TUBAL LIGATION        Social History:     Social History     Socioeconomic History   • Marital status: /Civil Union     Spouse name: None   • Number of children: None   • Years of education: None   • Highest education level: None   Occupational History   • None   Tobacco Use   • Smoking status: Never Smoker   • Smokeless tobacco: Never Used   Vaping Use   • Vaping Use: Never used   Substance and Sexual Activity   • Alcohol use: No   • Drug use: No   • Sexual activity: None   Other Topics Concern   • None   Social History Narrative   • None     Social Determinants of Health     Financial Resource Strain: Not on file   Food Insecurity: Not on file   Transportation Needs: Not on file   Physical Activity: Not on file   Stress: Not on file   Social Connections: Not on file   Intimate Partner Violence: Not on file   Housing Stability: Not on file      Family History:     Family History   Problem Relation Age of Onset   • Stroke Mother    • Diabetes Mother    • Hypertension Mother    • Kidney failure Mother    • Dialysis Mother    • No Known Problems Father       Current Medications:     Current Outpatient Medications   Medication Sig Dispense Refill   • nebivolol (BYSTOLIC) 20 MG tablet Take 1 tablet (20 mg total) by mouth daily Nebivolol only!! 90 tablet 3   • NIFEdipine ER (ADALAT CC) 30 MG 24 hr tablet Take 1 tablet (30 mg total) by mouth daily 90 tablet 2   • SUMAtriptan-naproxen (TREXIMET)  MG per tablet TAKE ONE TABLET AT HEADACHE ONSET, MAY REPEAT 1 AFTER 2 HOURS AS NEEDED    MAXIMUM OF 2/24 HOURS 9 tablet 5   • topiramate (Topamax) 50 MG tablet Take 1 tablet (50 mg total) by mouth daily at bedtime Take 25mg for the first week than 50mg thereafter 30 tablet 1     No current facility-administered medications for this visit  Allergies: Allergies   Allergen Reactions   • Amoxicillin Hives and Shortness Of Breath     Other reaction(s): Unknown Reaction   • Ciprofloxacin Hives and Shortness Of Breath     Other reaction(s): Unknown Reaction   • Hydrocodone-Acetaminophen Hives and Shortness Of Breath   • Influenza Vaccine Live Hives   • Lisinopril Anaphylaxis and Angioedema     Other reaction(s): Unknown Reaction  Other reaction(s): Angioedema   • Nitrofurantoin Hives and Shortness Of Breath     Other reaction(s): Unknown Reaction   • Sulfa Antibiotics Hives, Shortness Of Breath and Anaphylaxis   • Sulfamethoxazole-Trimethoprim Anaphylaxis     Other reaction(s): Unknown Reaction   • Influenza Vaccines Other (See Comments)     hives   • Oxycodone    • Quinolones      Other reaction(s): Unknown Reaction  Other reaction(s): Unknown Reaction  Other reaction(s): Unknown Reaction   • Amlodipine Rash     Other reaction(s): Unknown Reaction   • Other Hives, Rash and Fever     estrogen patch      Physical Exam:     /90   Pulse 74   Temp 97 6 °F (36 4 °C)   Resp 16   Wt 95 4 kg (210 lb 6 4 oz)   SpO2 96%   BMI 35 01 kg/m²     Physical Exam  Vitals and nursing note reviewed  Constitutional:       General: She is not in acute distress  Appearance: Normal appearance  She is well-developed  She is obese  She is not ill-appearing  HENT:      Head: Normocephalic and atraumatic  Right Ear: Tympanic membrane, ear canal and external ear normal       Left Ear: Tympanic membrane, ear canal and external ear normal       Nose: Nose normal  No congestion or rhinorrhea  Eyes:      Conjunctiva/sclera: Conjunctivae normal       Pupils: Pupils are equal, round, and reactive to light  Cardiovascular:      Rate and Rhythm: Normal rate and regular rhythm  Pulses: Normal pulses        Heart sounds: Normal heart sounds  No murmur heard  Pulmonary:      Effort: Pulmonary effort is normal  No respiratory distress  Breath sounds: Normal breath sounds  Abdominal:      General: Bowel sounds are normal  There is no distension  Palpations: Abdomen is soft  Tenderness: There is no abdominal tenderness  Musculoskeletal:         General: No swelling or tenderness  Normal range of motion  Cervical back: Normal range of motion  No tenderness  Right lower leg: No edema  Left lower leg: No edema  Lymphadenopathy:      Cervical: No cervical adenopathy  Skin:     General: Skin is warm and dry  Capillary Refill: Capillary refill takes less than 2 seconds  Neurological:      Mental Status: She is alert and oriented to person, place, and time  Psychiatric:         Mood and Affect: Mood normal          Behavior: Behavior normal          Thought Content:  Thought content normal          Judgment: Judgment normal           Nava Valentin, [address] [facility]

## 2022-10-14 NOTE — ASSESSMENT & PLAN NOTE
Discussed sleep hygiene and natural supplements to try for insomnia  Discussed relaxation and self-care is important for sleep  Written information provided

## 2022-10-14 NOTE — PATIENT INSTRUCTIONS
Sleep & Rest:     - Blue light blocking glasses: Studies have shown that exposure to blue light from computer, tablet, and smartphone screens may negatively affect circadian rhythms and sleep  A simple way to protect yourself is by wearing blue light blocking glasses any time you're staring at a screen and especially after 4 pm  You can grab an inexpensive pair online     - Brain Dump: An hour before bedtime, grab a journal and pen and dump out everything that's in your brain  All the lists, concerns, fears, anxiety, and even ideas and leave them there for the next day  Waking up in the middle of the night? Leave a pen and pad near the bed, write down whatever thoughts are racing through your head, and leave them for tomorrow  - Go to bed each night at the same time and wake-up each morning at the same time  If you can, try to start each day with 3 deep breaths and exercise  - Limit Media: I think that media (both social media and the news) ramps up the noise levels in our brains  These negative cycles facilitate neuronal pathways that keep us in a state of anxiety and depression  Be really intentional about the media you consume; physically set limits on your phone for certain websites and choose to check-in only 1 to 3 times per day  - White Noise Machine    - Weighted [employer]    - Try 2 kiwi prior to bed (helps to reduce time to fall asleep and stay asleep  AND Be intentional about resting (rest isn't sleep; it's engaging in activities that bring you energy)  Work it into your schedule! Supplements and teas to try:    Passionflower - very safe and reliable, calming  Used for people who can't sleep as their brain “can't stop the chatter,” people who are prone to be angry or frustrated  Used as tea, tincture or in capsules (250-1000 mg crude herb) 1 hour before bedtime  Valerian - several studies show improvement in sleep quality with 160 mg to 600 mg/day   Some show improved sleep latency and duration of sleep, reduced awakenings and improved insomnia severity score, also available asa tea  Lavender - Silexan (lavender oil capsule preparation)  80 mg of silexan significantly improved quality of sleep  Chamomile - 200 mg twice a day (capsules) may significantly improve sleep latency and sleep quality  Also available as a tea  Magnesium glyconate - 1 hour before bedtime             Wellness Visit for Adults   AMBULATORY CARE:   A wellness visit  is when you see your healthcare provider to get screened for health problems  Your healthcare provider will also give you advice on how to stay healthy  Write down your questions so you remember to ask them  Ask your healthcare provider how often you should have a wellness visit  What happens at a wellness visit:  Your healthcare provider will ask about your health, and your family history of health problems  This includes high blood pressure, heart disease, and cancer  He or she will ask if you have symptoms that concern you, if you smoke, and about your mood  You may also be asked about your intake of medicines, supplements, food, and alcohol  Any of the following may be done: Your weight  will be checked  Your height may also be checked so your body mass index (BMI) can be calculated  Your BMI shows if you are at a healthy weight  Your blood pressure  and heart rate will be checked  Your temperature may also be checked  Blood and urine tests  may be done  Blood tests may be done to check your cholesterol levels  Abnormal cholesterol levels increase your risk for heart disease and stroke  You may also need a blood or urine test to check for diabetes if you are at increased risk  Urine tests may be done to look for signs of an infection or kidney disease  A physical exam  includes checking your heartbeat and lungs with a stethoscope  Your healthcare provider may also check your skin to look for sun damage  Screening tests  may be recommended   A screening test is done to check for diseases that may not cause symptoms  The screening tests you may need depend on your age, gender, family history, and lifestyle habits  For example, colorectal screening may be recommended if you are 48years old or older  Screening tests you need if you are a woman:   A Pap smear  is used to screen for cervical cancer  Pap smears are usually done every 3 to 5 years depending on your age  You may need them more often if you have had abnormal Pap smear test results in the past  Ask your healthcare provider how often you should have a Pap smear  A mammogram  is an x-ray of your breasts to screen for breast cancer  Experts recommend mammograms every 2 years starting at age 48 years  You may need a mammogram at age 52 years or younger if you have an increased risk for breast cancer  Talk to your healthcare provider about when you should start having mammograms and how often you need them  Vaccines you may need:   Get an influenza vaccine  every year  The influenza vaccine protects you from the flu  Several types of viruses cause the flu  The viruses change over time, so new vaccines are made each year  Get a tetanus-diphtheria (Td) booster vaccine  every 10 years  This vaccine protects you against tetanus and diphtheria  Tetanus is a severe infection that may cause painful muscle spasms and lockjaw  Diphtheria is a severe bacterial infection that causes a thick covering in the back of your mouth and throat  Get a human papillomavirus (HPV) vaccine  if you are female and aged 23 to 32 or male 23 to 24 and never received it  This vaccine protects you from HPV infection  HPV is the most common infection spread by sexual contact  HPV may also cause vaginal, penile, and anal cancers  Get a pneumococcal vaccine  if you are aged 72 years or older  The pneumococcal vaccine is an injection given to protect you from pneumococcal disease   Pneumococcal disease is an infection caused by pneumococcal bacteria  The infection may cause pneumonia, meningitis, or an ear infection  Get a shingles vaccine  if you are 60 or older, even if you have had shingles before  The shingles vaccine is an injection to protect you from the varicella-zoster virus  This is the same virus that causes chickenpox  Shingles is a painful rash that develops in people who had chickenpox or have been exposed to the virus  How to eat healthy:  My Plate is a model for planning healthy meals  It shows the types and amounts of foods that should go on your plate  Fruits and vegetables make up about half of your plate, and grains and protein make up the other half  A serving of dairy is included on the side of your plate  The amount of calories and serving sizes you need depends on your age, gender, weight, and height  Examples of healthy foods are listed below:  Eat a variety of vegetables  such as dark green, red, and orange vegetables  You can also include canned vegetables low in sodium (salt) and frozen vegetables without added butter or sauces  Eat a variety of fresh fruits , canned fruit in 100% juice, frozen fruit, and dried fruit  Include whole grains  At least half of the grains you eat should be whole grains  Examples include whole-wheat bread, wheat pasta, brown rice, and whole-grain cereals such as oatmeal     Eat a variety of protein foods such as seafood (fish and shellfish), lean meat, and poultry without skin (turkey and chicken)  Examples of lean meats include pork leg, shoulder, or tenderloin, and beef round, sirloin, tenderloin, and extra lean ground beef  Other protein foods include eggs and egg substitutes, beans, peas, soy products, nuts, and seeds  Choose low-fat dairy products such as skim or 1% milk or low-fat yogurt, cheese, and cottage cheese  Limit unhealthy fats  such as butter, hard margarine, and shortening         Exercise:  Exercise at least 30 minutes per day on most days of the week  Some examples of exercise include walking, biking, dancing, and swimming  You can also fit in more physical activity by taking the stairs instead of the elevator or parking farther away from stores  Include muscle strengthening activities 2 days each week  Regular exercise provides many health benefits  It helps you manage your weight, and decreases your risk for type 2 diabetes, heart disease, stroke, and high blood pressure  Exercise can also help improve your mood  Ask your healthcare provider about the best exercise plan for you  General health and safety guidelines:   Do not smoke  Nicotine and other chemicals in cigarettes and cigars can cause lung damage  Ask your healthcare provider for information if you currently smoke and need help to quit  E-cigarettes or smokeless tobacco still contain nicotine  Talk to your healthcare provider before you use these products  Limit alcohol  A drink of alcohol is 12 ounces of beer, 5 ounces of wine, or 1½ ounces of liquor  Lose weight, if needed  Being overweight increases your risk of certain health conditions  These include heart disease, high blood pressure, type 2 diabetes, and certain types of cancer  Protect your skin  Do not sunbathe or use tanning beds  Use sunscreen with a SPF 15 or higher  Apply sunscreen at least 15 minutes before you go outside  Reapply sunscreen every 2 hours  Wear protective clothing, hats, and sunglasses when you are outside  Drive safely  Always wear your seatbelt  Make sure everyone in your car wears a seatbelt  A seatbelt can save your life if you are in an accident  Do not use your cell phone when you are driving  This could distract you and cause an accident  Pull over if you need to make a call or send a text message  Practice safe sex  Use latex condoms if are sexually active and have more than one partner   Your healthcare provider may recommend screening tests for sexually transmitted infections (STIs)  Wear helmets, lifejackets, and protective gear  Always wear a helmet when you ride a bike or motorcycle, go skiing, or play sports that could cause a head injury  Wear protective equipment when you play sports  Wear a lifejacket when you are on a boat or doing water sports  © Copyright [employer] 2022 Information is for End User's use only and may not be sold, redistributed or otherwise used for commercial purposes  All illustrations and images included in CareNotes® are the copyrighted property of [employer]  or [facility] Len Sheikh   The above information is an  only  It is not intended as medical advice for individual conditions or treatments  Talk to your doctor, nurse or pharmacist before following any medical regimen to see if it is safe and effective for you

## 2022-10-14 NOTE — ASSESSMENT & PLAN NOTE
Continue to make healthy food choices and stay active  Will start on Topamax 25 mg for the 1st week and then 50 thereafter    Return in 4 weeks

## 2022-10-14 NOTE — ASSESSMENT & PLAN NOTE
States that she has always had diastolic in her 83H, pressure medication managed by Dr Yuridia Dumont   Continue to take medications as prescribed, continue to check your blood pressure daily  Will work weight loss

## 2022-11-07 ENCOUNTER — OFFICE VISIT (OUTPATIENT)
Dept: VASCULAR SURGERY | Facility: CLINIC | Age: 52
End: 2022-11-07

## 2022-11-07 VITALS
HEIGHT: 65 IN | SYSTOLIC BLOOD PRESSURE: 118 MMHG | HEART RATE: 84 BPM | TEMPERATURE: 98.2 F | BODY MASS INDEX: 34.16 KG/M2 | DIASTOLIC BLOOD PRESSURE: 80 MMHG | WEIGHT: 205 LBS

## 2022-11-07 DIAGNOSIS — I83.93 VARICOSE VEINS OF BOTH LOWER EXTREMITIES, UNSPECIFIED WHETHER COMPLICATED: ICD-10-CM

## 2022-11-07 DIAGNOSIS — I83.93 SPIDER VEINS OF BOTH LOWER EXTREMITIES: Primary | ICD-10-CM

## 2022-11-07 NOTE — PROGRESS NOTES
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   - has trip planned to [facility] in April  - BLE scattered telangiectasia  Significant amount of spider veins  We will plan to treat in 2 sessions single leg at a time up to 10 mL solution (Asclera)  - palpable distal pulses bilaterally    Plan:  - cosmetic sclerotherapy injections  Full set $500/ session up to 10 mL asclera  90 minute session  We will plan to treat in 2 sessions 1 leg at a time  - may schedule when ready  - continue conservative management with daily use of medical grade compression stockings 20-30 mmHg  - frequent ambulation, leg elevation at rest, and diligent skin care   - new script for thigh-high compression stockings 20-30 mmHg given at office visit today  Diagnoses and all orders for this visit:    Spider veins of both lower extremities  -     Compression Stocking    Varicose veins of both lower extremities, unspecified whether complicated  -     Ambulatory Referral to Vascular Surgery          Subjective:      Patient ID: Leslie Menon is a 45 y o  female  New patient, presents today for evaluation of VV  Patient reports BLE reticular and spider veins  She also reports h/o b/l sclero injections  Wearing compression on occasion  HPI   See assessment plan     77-year-old female presents interested in improving the appearance of BLE spider veins    She reports history of sclerotherapy in the past with decent results approximately 15 years ago w/ multiple sessions and then again 3 years ago with single session prior to hysterectomy  She was not able to return for additional set  Discussed spider telangiectasia and treatment with sclerotherapy  Advised patient best be done in fall/winter, multiple sessions may be required for desired results, possibility of hyperpigmentation especially in those with higher [place], darker skin tone and that is sclerotherapy is not covered by insurance and therefore would be fee for service $500 per session up to 10 mL  Given the extent of patient's spider veins and areas she would like to be improved  Would suggest treating 1 leg at a time  Patient has planned trip to [facility] in April  Discussed best be treated in fall/winter and to not have sun exposure within month of sclerotherapy injections  Post instructions for sclerotherapy reviewed  New script for compression stockings giving   Sclerotherapy packet given  May schedule when ready  Full set 90 minutes  All questions answered  The following portions of the patient's history were reviewed and updated as appropriate: allergies, current medications, past family history, past medical history, past social history, past surgical history and problem list     Review of Systems   Constitutional: Negative  HENT: Negative  Eyes: Negative  Respiratory: Negative  Cardiovascular: Positive for leg swelling  Painful veins   Gastrointestinal: Negative  Endocrine: Negative  Genitourinary: Negative  Musculoskeletal: Negative  Skin: Negative  Allergic/Immunologic: Negative  Neurological: Negative  Hematological: Negative  Psychiatric/Behavioral: Negative  I have reviewed and made appropriate changes to the review of systems input by the medical assistant        Objective:      /80 (BP Location: Right arm, Patient Position: Sitting)   Pulse 84   Temp 98 2 °F (36 8 °C) (Tympanic)   Ht 5' 5" (1 651 m)   Wt 93 kg (205 lb)   BMI 34 11 kg/m²          Physical Exam  Vitals reviewed  Constitutional:       Appearance: Normal appearance  She is obese  Cardiovascular:      Rate and Rhythm: Normal rate  Pulses: Normal pulses  Dorsalis pedis pulses are 2+ on the right side and 2+ on the left side  Pulmonary:      Effort: Pulmonary effort is normal    Musculoskeletal:         General: Normal range of motion  Right lower leg: Edema present  Left lower leg: Edema present  Skin:     General: Skin is warm and dry  Capillary Refill: Capillary refill takes less than 2 seconds  Comments: BLE scattered telangiectasia and reticular veins  LLE lateral dilated reticular veins   Neurological:      Mental Status: She is alert and oriented to person, place, and time     Psychiatric:         Behavior: Behavior normal                                          Vitals:    11/07/22 1331   BP: 118/80   BP Location: Right arm   Patient Position: Sitting   Pulse: 84   Temp: 98 2 °F (36 8 °C)   TempSrc: Tympanic   Weight: 93 kg (205 lb)   Height: 5' 5" (1 651 m)       Patient Active Problem List   Diagnosis   • Essential (primary) hypertension   • Proteinuria   • Migraine without aura and without status migrainosus, not intractable   • Pain in right knee   • Status post total hysterectomy   • Family history of breast cancer   • Vitamin D deficiency   • Vegetarian diet   • Weight gain   • Spider veins of both lower extremities   • Adult BMI 35 0-35 9 kg/sq m   • Healthcare maintenance   • Familial hypercholesterolemia   • Primary insomnia       Past Surgical History:   Procedure Laterality Date   • ENDOMETRIAL BIOPSY  02/26/2018   • FOOT SURGERY Bilateral     reconstructive   • HYSTERECTOMY  09/04/2018   • TONSILLECTOMY     • TUBAL LIGATION         Family History   Problem Relation Age of Onset   • Stroke Mother    • Diabetes Mother    • Hypertension Mother    • Kidney failure Mother    • Dialysis Mother    • No Known Problems Father        Social History     Socioeconomic History   • Marital status: /Civil Union     Spouse name: Not on file   • Number of children: Not on file   • Years of education: Not on file   • Highest education level: Not on file   Occupational History   • Not on file   Tobacco Use   • Smoking status: Never Smoker   • Smokeless tobacco: Never Used   Vaping Use   • Vaping Use: Never used   Substance and Sexual Activity   • Alcohol use: No   • Drug use: No   • Sexual activity: Not on file   Other Topics Concern   • Not on file   Social History Narrative   • Not on file     Social Determinants of Health     Financial Resource Strain: Not on file   Food Insecurity: Not on file   Transportation Needs: Not on file   Physical Activity: Not on file   Stress: Not on file   Social Connections: Not on file   Intimate Partner Violence: Not on file   Housing Stability: Not on file       Allergies   Allergen Reactions   • Amoxicillin Hives and Shortness Of Breath     Other reaction(s): Unknown Reaction   • Ciprofloxacin Hives and Shortness Of Breath     Other reaction(s): Unknown Reaction   • Hydrocodone-Acetaminophen Hives and Shortness Of Breath   • Influenza Vaccine Live Hives   • Lisinopril Anaphylaxis and Angioedema     Other reaction(s): Unknown Reaction  Other reaction(s): Angioedema   • Nitrofurantoin Hives and Shortness Of Breath     Other reaction(s): Unknown Reaction   • Sulfa Antibiotics Hives, Shortness Of Breath and Anaphylaxis   • Sulfamethoxazole-Trimethoprim Anaphylaxis     Other reaction(s): Unknown Reaction   • Influenza Vaccines Other (See Comments)     hives   • Oxycodone    • Quinolones      Other reaction(s): Unknown Reaction  Other reaction(s): Unknown Reaction  Other reaction(s): Unknown Reaction   • Amlodipine Rash     Other reaction(s): Unknown Reaction   • Other Hives, Rash and Fever     estrogen patch         Current Outpatient Medications:   •  nebivolol (BYSTOLIC) 20 MG tablet, Take 1 tablet (20 mg total) by mouth daily Nebivolol only!!, Disp: 90 tablet, Rfl: 3  •  NIFEdipine ER (ADALAT CC) 30 MG 24 hr tablet, Take 1 tablet (30 mg total) by mouth daily, Disp: 90 tablet, Rfl: 2  •  SUMAtriptan-naproxen (TREXIMET)  MG per tablet, TAKE ONE TABLET AT HEADACHE ONSET, MAY REPEAT 1 AFTER 2 HOURS AS NEEDED    MAXIMUM OF 2/24 HOURS, Disp: 9 tablet, Rfl: 5  •  topiramate (Topamax) 50 MG tablet, Take 1 tablet (50 mg total) by mouth daily at bedtime Take 25mg for the first week than 50mg thereafter, Disp: 30 tablet, Rfl: 1

## 2022-11-07 NOTE — PATIENT INSTRUCTIONS
Continue conservative medical management with daily use of medical grade compression stockings 20-30 mmHg  On a m , off in p m  Frequent ambulation   Leg elevation at rest   Moisturizer and diligent skin care to maintain skin integrity and prevent skin breakdown    Recommend cosmetic sclerotherapy injections if desired  Best to be done fall/ winter  Multiple sessions may be required    $500 per session up to 10mL  Bring, do not wear, compression day of procedure  Your legs might look worse before better, veins may look darker      Bruising can be normal  Please refer to sclerotherapy packet instructions for additional information

## 2022-11-07 NOTE — ASSESSMENT & PLAN NOTE
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   - has trip planned to [facility] in April  - BLE scattered telangiectasia  Significant amount of spider veins  We will plan to treat in 2 sessions single leg at a time up to 10 mL solution (Asclera)  - palpable distal pulses bilaterally    Plan:  - cosmetic sclerotherapy injections  Full set $500/ session up to 10 mL asclera  90 minute session  We will plan to treat in 2 sessions 1 leg at a time  - may schedule when ready  - continue conservative management with daily use of medical grade compression stockings 20-30 mmHg  - frequent ambulation, leg elevation at rest, and diligent skin care   - new script for thigh-high compression stockings 20-30 mmHg given at office visit today

## 2022-11-13 DIAGNOSIS — R63.5 WEIGHT GAIN: ICD-10-CM

## 2022-11-13 RX ORDER — TOPIRAMATE 50 MG/1
TABLET, FILM COATED ORAL
Qty: 90 TABLET | Refills: 1 | Status: SHIPPED | OUTPATIENT
Start: 2022-11-13

## 2022-11-18 ENCOUNTER — OFFICE VISIT (OUTPATIENT)
Dept: FAMILY MEDICINE CLINIC | Facility: CLINIC | Age: 52
End: 2022-11-18

## 2022-11-18 VITALS
RESPIRATION RATE: 14 BRPM | DIASTOLIC BLOOD PRESSURE: 60 MMHG | HEART RATE: 98 BPM | SYSTOLIC BLOOD PRESSURE: 138 MMHG | TEMPERATURE: 97.8 F | OXYGEN SATURATION: 98 % | BODY MASS INDEX: 34.95 KG/M2 | WEIGHT: 209.8 LBS | HEIGHT: 65 IN

## 2022-11-18 DIAGNOSIS — R63.5 WEIGHT GAIN: Primary | ICD-10-CM

## 2022-11-18 DIAGNOSIS — I10 ESSENTIAL (PRIMARY) HYPERTENSION: ICD-10-CM

## 2022-11-18 DIAGNOSIS — Z78.9 VEGETARIAN DIET: ICD-10-CM

## 2022-11-18 DIAGNOSIS — I83.93 SPIDER VEINS OF BOTH LOWER EXTREMITIES: ICD-10-CM

## 2022-11-18 DIAGNOSIS — G43.009 MIGRAINE WITHOUT AURA AND WITHOUT STATUS MIGRAINOSUS, NOT INTRACTABLE: ICD-10-CM

## 2022-11-18 RX ORDER — SUMATRIPTAN AND NAPROXEN SODIUM 85; 500 MG/1; MG/1
TABLET, FILM COATED ORAL
Qty: 9 TABLET | Refills: 5 | Status: SHIPPED | OUTPATIENT
Start: 2022-11-18

## 2022-11-18 NOTE — PATIENT INSTRUCTIONS
Weight Loss      Create healthy habits:    Aim for 1200 calories a day - track it   Drink plenty of water - half of your body weight in oz   Aim for 8h of sleep per night   Exercise daily - at least 30min per day     Some of the weight loss apps to look into:    Lose It! -- free weight loss sofya builds a custom nutrition plan for your weight loss goals  It uses your information to create a daily calorie guide and has a food tracking log so you can keep track of your progress  MyFitnessPal --free weight loss sofya to track calories and food intake  Their food tracking database boats over 5 million existing food options to choose from, and they integrate restaurants too  Going out to eat can make food logging incredibly difficult, and MyFitnessPal helps make that process a little bit easier  StepBet -- is another free weight loss sofya, but this one has a twist  Instead of simply logging your own activity, in StepBet, you join fitness challenges and bet  That’s right, you ferguson on your own weight loss success rate  For those of us who need some extra motivation and accountability, this is a pretty cool concept  Fooducate -- is a free weight loss sofya that heads to the root of weight gain, the grocery store  While a lot of other apps focus on exercise and food tracking, Fooducate is one of the few trying to help you make better food choices from the beginning  You can scan items while you’re at the store to check out information on nutrition and warnings, like gross ingredients that are hidden under other names  Sweatcoin -- is the weight loss sofya that pays you to lose weight  Sweatcoin tracks all of your outdoor steps toward rewards  They have their own points currency and you can earn things like subscriptions to health related apps, like Calm, or even help from a virtual   If point systems get you motivated, Sweatcoin may be the perfect free weight loss sofya for you

## 2022-11-18 NOTE — PROGRESS NOTES
Assessment/Plan:         Problem List Items Addressed This Visit        Cardiovascular and Mediastinum    Essential (primary) hypertension     Stable on current medication regiment         Migraine without aura and without status migrainosus, not intractable     Stable on current medication regiment         Relevant Medications    SUMAtriptan-naproxen (TREXIMET)  MG per tablet    Spider veins of both lower extremities     Continue to see vascular            Other    Vegetarian diet     Continues on vegeterian diet - did cut down on carb intake and increased water intake to 70oz daily  Continue to walk/run with the dogs          Weight gain - Primary     Still doing 25mg at bedtime due to possibility elevated HR  Will continue to monitor          Adult BMI 35 0-35 9 kg/sq m     Continue to take Topamax at bedtime, increase to 50 mg at bedtime  Continue to monitor your heart rate if it increases above 100 beats per minute, stop the medication and let me know  Subjective:      Patient ID: Dave Patrick is a 45 y o  female  Patient presents to the office for a routine follow-up  Has been taking all of the medications as prescribed and denies any adverse effects  Recent studies and blood work reviewed  Denies any chest pain, shortness a breath, dizziness or increase in headaches  The following portions of the patient's history were reviewed and updated as appropriate:   Past Medical History:  She has a past medical history of Fatigue, Hypertension, Migraine, and Proteinuria ,  _______________________________________________________________________  Medical Problems:  does not have any pertinent problems on file ,  _______________________________________________________________________  Past Surgical History:   has a past surgical history that includes Tubal ligation; Endometrial biopsy (02/26/2018); Tonsillectomy;  Foot surgery (Bilateral); and Hysterectomy (09/04/2018)  ,  _______________________________________________________________________  Family History:  family history includes Diabetes in her mother; Dialysis in her mother; Hypertension in her mother; Kidney failure in her mother; No Known Problems in her father; Stroke in her mother ,  _______________________________________________________________________  Social History:   reports that she has never smoked  She has never used smokeless tobacco  She reports that she does not drink alcohol and does not use drugs  ,  _______________________________________________________________________  Allergies:  is allergic to amoxicillin, ciprofloxacin, hydrocodone-acetaminophen, influenza vaccine live, lisinopril, nitrofurantoin, sulfa antibiotics, sulfamethoxazole-trimethoprim, influenza vaccines, oxycodone, quinolones, amlodipine, and other     _______________________________________________________________________  Current Outpatient Medications   Medication Sig Dispense Refill   • nebivolol (BYSTOLIC) 20 MG tablet Take 1 tablet (20 mg total) by mouth daily Nebivolol only!! 90 tablet 3   • NIFEdipine ER (ADALAT CC) 30 MG 24 hr tablet Take 1 tablet (30 mg total) by mouth daily 90 tablet 2   • SUMAtriptan-naproxen (TREXIMET)  MG per tablet TAKE ONE TABLET AT HEADACHE ONSET, MAY REPEAT 1 AFTER 2 HOURS AS NEEDED   MAXIMUM OF 2/24 HOURS 9 tablet 5   • topiramate (TOPAMAX) 50 MG tablet TAKE 25MG FOR THE FIRST WEEK THEN 50MG DAILY AT BEDTIME THEREAFTER 90 tablet 1     No current facility-administered medications for this visit      _______________________________________________________________________  Review of Systems   Constitutional: Negative for chills and fever  Respiratory: Negative for cough and shortness of breath  Cardiovascular: Negative for chest pain  Episodic increase in HR    Gastrointestinal: Negative for abdominal pain and vomiting  Genitourinary: Negative for dysuria     Musculoskeletal: Negative for arthralgias and back pain  Neurological: Negative for headaches  All other systems reviewed and are negative  Objective:  Vitals:    11/18/22 0650   BP: 138/60   Pulse: 98   Resp: 14   Temp: 97 8 °F (36 6 °C)   SpO2: 98%   Weight: 95 2 kg (209 lb 12 8 oz)   Height: 5' 5" (1 651 m)     Body mass index is 34 91 kg/m²  Physical Exam  Vitals and nursing note reviewed  Constitutional:       General: She is not in acute distress  Appearance: Normal appearance  She is obese  She is not ill-appearing  Cardiovascular:      Rate and Rhythm: Normal rate and regular rhythm  Heart sounds: Normal heart sounds  Pulmonary:      Effort: Pulmonary effort is normal       Breath sounds: Normal breath sounds  Musculoskeletal:         General: Normal range of motion  Skin:     General: Skin is warm and dry  Neurological:      Mental Status: She is alert and oriented to person, place, and time  Psychiatric:         Mood and Affect: Mood normal          Behavior: Behavior normal          Thought Content:  Thought content normal          Judgment: Judgment normal

## 2022-11-18 NOTE — ASSESSMENT & PLAN NOTE
Continue to take Topamax at bedtime, increase to 50 mg at bedtime  Continue to monitor your heart rate if it increases above 100 beats per minute, stop the medication and let me know

## 2022-11-18 NOTE — ASSESSMENT & PLAN NOTE
Continues on vegeterian diet - did cut down on carb intake and increased water intake to 70oz daily     Continue to walk/run with the dogs

## 2022-11-21 ENCOUNTER — TELEPHONE (OUTPATIENT)
Dept: ADMINISTRATIVE | Facility: OTHER | Age: 52
End: 2022-11-21

## 2022-11-21 NOTE — TELEPHONE ENCOUNTER
----- Message from Jerry Chavez sent at 11/18/2022  2:18 PM EST -----  Regarding: hm on colonoscopy  11/18/22 2:18 PM    Hello, our patient Marychuy Vidal has had CRC: Colonoscopy completed/performed  Please assist in updating the patient chart by pulling the Care Everywhere (CE) document  The date of service is 05/20/2020       Thank you,  VI Chavez PG [facility] [id number] [facility]

## 2022-11-21 NOTE — TELEPHONE ENCOUNTER
Upon review of the In Basket request we were able to locate, review, and update the patient chart as requested for CRC: Colonoscopy  Any additional questions or concerns should be emailed to the Practice Liaisons via the appropriate education email address, please do not reply via In Basket  Thank you  Gina Manley MA       Patient not found in [facility] portal for Care Gap Closure

## 2022-11-29 PROBLEM — Z00.00 HEALTHCARE MAINTENANCE: Status: RESOLVED | Noted: 2022-09-30 | Resolved: 2022-11-29

## 2022-12-12 ENCOUNTER — CLINICAL SUPPORT (OUTPATIENT)
Dept: VASCULAR SURGERY | Facility: CLINIC | Age: 52
End: 2022-12-12

## 2022-12-12 VITALS
TEMPERATURE: 98 F | WEIGHT: 203 LBS | SYSTOLIC BLOOD PRESSURE: 150 MMHG | DIASTOLIC BLOOD PRESSURE: 90 MMHG | HEIGHT: 65 IN | BODY MASS INDEX: 33.82 KG/M2 | HEART RATE: 68 BPM

## 2022-12-12 DIAGNOSIS — I83.93 SPIDER VEINS OF BOTH LOWER EXTREMITIES: Primary | ICD-10-CM

## 2022-12-12 NOTE — PROGRESS NOTES
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   - has trip planned to [facility] in April  - BLE scattered telangiectasia  Significant amount of spider veins     - Patient wants to treat BLE below knees only today  - palpable distal pulses bilaterally    Plan:  - cosmetic sclerotherapy injections w/ Asclera 0/5% solution up to 10ml  Full set BLE below knee  - post sclero instructions given  Diagnoses and all orders for this visit:    Spider veins of both lower extremities          Subjective:      Patient ID: Uday Murillo is a 45 y o  female      Indications for Procedure: Patient presents with scattered BLE dilated reticular veins and telangiectasia on the legs  Objective      Exam: On bilateral lower extremities, dilated reticular veins and telangiectasias are present symmetrically without findings of chronic venous insufficiency  Superficial prominent veins  Patient advised or risks of pain upon injection, redness and swelling after treatment, bruising and bleeding, necrosis/ulceration, allergy, discoloration and the likelihood that multiple treatments may be necessary and clearance may not be complete  Sluggish uptake LLE  Affected areas on the BLE below knee were treated with intravascular injections of 8 cc of  0 5% Asclera using a 32G syringe per ’s protocol      The patient tolerated the procedure well with minimal erythema and edema  Immediate pressure was applied with cotton balls secured with tape at the injection sites  Compression stockings of 20-30mm Hg were applied to the treated legs  The patient was advised to wear compression stockings during the day for the next 2 weeks       Follow up in 5-6  weeks

## 2022-12-12 NOTE — ASSESSMENT & PLAN NOTE
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

## 2022-12-12 NOTE — PATIENT INSTRUCTIONS
Keep cotton ball pressure dressing and compression stockings in place x 36-48 hrs  (sleep with them for 2 nights) Then you may remove everything and take a shower  Then, wear compression stockings daily  On a m , off in p m  x 2-3 weeks  Encourage ambulation and leg elevation at rest   You may resume exercise activities in 1 week while wearing compression  Your legs might look worse before better, veins may look darker      Bruising can be normal  Call if you are experiencing any pain  Return to office in 5-6 weeks for post injection follow-up

## 2022-12-23 ENCOUNTER — HOSPITAL ENCOUNTER (OUTPATIENT)
Dept: MAMMOGRAPHY | Facility: CLINIC | Age: 52
End: 2022-12-23

## 2022-12-23 VITALS — WEIGHT: 203 LBS | HEIGHT: 64 IN | BODY MASS INDEX: 34.66 KG/M2

## 2022-12-23 DIAGNOSIS — Z12.31 ENCOUNTER FOR SCREENING MAMMOGRAM FOR MALIGNANT NEOPLASM OF BREAST: ICD-10-CM

## 2022-12-30 ENCOUNTER — OFFICE VISIT (OUTPATIENT)
Dept: FAMILY MEDICINE CLINIC | Facility: CLINIC | Age: 52
End: 2022-12-30

## 2022-12-30 VITALS
RESPIRATION RATE: 12 BRPM | HEART RATE: 74 BPM | TEMPERATURE: 97.5 F | DIASTOLIC BLOOD PRESSURE: 80 MMHG | HEIGHT: 64 IN | WEIGHT: 206 LBS | SYSTOLIC BLOOD PRESSURE: 130 MMHG | OXYGEN SATURATION: 100 % | BODY MASS INDEX: 35.17 KG/M2

## 2022-12-30 DIAGNOSIS — I10 ESSENTIAL (PRIMARY) HYPERTENSION: Primary | ICD-10-CM

## 2022-12-30 DIAGNOSIS — R63.5 WEIGHT GAIN: ICD-10-CM

## 2022-12-30 DIAGNOSIS — G43.009 MIGRAINE WITHOUT AURA AND WITHOUT STATUS MIGRAINOSUS, NOT INTRACTABLE: ICD-10-CM

## 2022-12-30 NOTE — PROGRESS NOTES
Assessment/Plan:         Problem List Items Addressed This Visit        Cardiovascular and Mediastinum    Essential (primary) hypertension - Primary     Stable, continue with current medication regiment  Migraine without aura and without status migrainosus, not intractable     Stable  Other    Weight gain     Continue to make healthy food choices, stay active, continue to take 50 mg of Topamax daily  We will add 8 mg of phentermine daily in the morning with breakfast          Relevant Medications    Phentermine HCl 8 MG TABS    Adult BMI 35 0-35 9 kg/sq m     Need to make healthy food choices, continue to decrease prepackaged foods and added sugar  Continue to walk daily with the dogs  We will add 80 mg of phentermine daily in the morning with breakfast   If you develop increased headaches, palpitations or chest pain please stop and let me know  Return in 4 weeks for follow-up  Relevant Medications    Phentermine HCl 8 MG TABS         Subjective:      Patient ID: Delia Jones is a 45 y o  female  Patient presents to the office for medication follow-up  She has been taking 50 mg of Topamax daily and doing well on it  She continues to eat healthy and walk daily with her dogs  She is denying any chest pain, palpitations, increase in headaches or dizziness  The following portions of the patient's history were reviewed and updated as appropriate:   Past Medical History:  She has a past medical history of Fatigue, Hypertension, Migraine, and Proteinuria ,  _______________________________________________________________________  Medical Problems:  does not have any pertinent problems on file ,  _______________________________________________________________________  Past Surgical History:   has a past surgical history that includes Tubal ligation; Endometrial biopsy (02/26/2018); Tonsillectomy; Foot surgery (Bilateral);  Hysterectomy (09/04/2018); and Breast biopsy (Left)  ,  _______________________________________________________________________  Family History:  family history includes Breast cancer (age of onset: 44) in her sister; Diabetes in her mother; Dialysis in her mother; Hypertension in her mother; Kidney failure in her mother; Lung cancer in her maternal grandmother; No Known Problems in her daughter and father; Stroke in her mother ,  _______________________________________________________________________  Social History:   reports that she has never smoked  She has never used smokeless tobacco  She reports that she does not drink alcohol and does not use drugs  ,  _______________________________________________________________________  Allergies:  is allergic to amoxicillin, ciprofloxacin, hydrocodone-acetaminophen, influenza vaccine live, lisinopril, nitrofurantoin, sulfa antibiotics, sulfamethoxazole-trimethoprim, influenza vaccines, oxycodone, quinolones, amlodipine, and other     _______________________________________________________________________  Current Outpatient Medications   Medication Sig Dispense Refill   • nebivolol (BYSTOLIC) 20 MG tablet Take 1 tablet (20 mg total) by mouth daily Nebivolol only!! 90 tablet 3   • NIFEdipine ER (ADALAT CC) 30 MG 24 hr tablet Take 1 tablet (30 mg total) by mouth daily 90 tablet 2   • Phentermine HCl 8 MG TABS Take 1 tablet (8 mg total) by mouth in the morning 30 tablet 0   • SUMAtriptan-naproxen (TREXIMET)  MG per tablet TAKE ONE TABLET AT HEADACHE ONSET, MAY REPEAT 1 AFTER 2 HOURS AS NEEDED   MAXIMUM OF 2/24 HOURS 9 tablet 5   • topiramate (TOPAMAX) 50 MG tablet TAKE 25MG FOR THE FIRST WEEK THEN 50MG DAILY AT BEDTIME THEREAFTER 90 tablet 1     No current facility-administered medications for this visit      _______________________________________________________________________  Review of Systems   Constitutional: Negative for chills and fever  Respiratory: Negative for cough and shortness of breath  Cardiovascular: Negative for chest pain  Gastrointestinal: Negative for abdominal pain and vomiting  Genitourinary: Negative for dysuria  Musculoskeletal: Negative for arthralgias and back pain  Neurological: Negative for headaches  All other systems reviewed and are negative  Objective:  Vitals:    12/30/22 0658   BP: 130/80   Pulse: 74   Resp: 12   Temp: 97 5 °F (36 4 °C)   SpO2: 100%   Weight: 93 4 kg (206 lb)   Height: 5' 4" (1 626 m)     Body mass index is 35 36 kg/m²  Physical Exam  Vitals and nursing note reviewed  Constitutional:       General: She is not in acute distress  Appearance: Normal appearance  She is obese  She is not ill-appearing  Cardiovascular:      Rate and Rhythm: Normal rate and regular rhythm  Heart sounds: Normal heart sounds  Pulmonary:      Effort: Pulmonary effort is normal       Breath sounds: Normal breath sounds  Musculoskeletal:         General: Normal range of motion  Skin:     General: Skin is warm and dry  Neurological:      Mental Status: She is alert and oriented to person, place, and time  Psychiatric:         Mood and Affect: Mood normal          Behavior: Behavior normal          Thought Content:  Thought content normal          Judgment: Judgment normal

## 2022-12-30 NOTE — ASSESSMENT & PLAN NOTE
Continue to make healthy food choices, stay active, continue to take 50 mg of Topamax daily    We will add 8 mg of phentermine daily in the morning with breakfast

## 2022-12-30 NOTE — ASSESSMENT & PLAN NOTE
Need to make healthy food choices, continue to decrease prepackaged foods and added sugar  Continue to walk daily with the dogs  We will add 80 mg of phentermine daily in the morning with breakfast   If you develop increased headaches, palpitations or chest pain please stop and let me know  Return in 4 weeks for follow-up

## 2023-01-27 ENCOUNTER — OFFICE VISIT (OUTPATIENT)
Dept: FAMILY MEDICINE CLINIC | Facility: CLINIC | Age: 53
End: 2023-01-27

## 2023-01-27 VITALS
WEIGHT: 204.2 LBS | OXYGEN SATURATION: 98 % | DIASTOLIC BLOOD PRESSURE: 86 MMHG | SYSTOLIC BLOOD PRESSURE: 120 MMHG | HEART RATE: 73 BPM | TEMPERATURE: 96.8 F | HEIGHT: 64 IN | BODY MASS INDEX: 34.86 KG/M2

## 2023-01-27 DIAGNOSIS — R63.5 WEIGHT GAIN: Primary | ICD-10-CM

## 2023-01-27 DIAGNOSIS — R22.0 SWELLING, MASS, OR LUMP ON FACE: ICD-10-CM

## 2023-01-27 DIAGNOSIS — F51.01 PRIMARY INSOMNIA: ICD-10-CM

## 2023-01-27 DIAGNOSIS — I10 ESSENTIAL (PRIMARY) HYPERTENSION: ICD-10-CM

## 2023-01-27 NOTE — ASSESSMENT & PLAN NOTE
Continue to take 8 mg of phentermine daily with breakfast   Continue to walk daily with the dogs, continue to make healthy food choices and limit carbohydrate and added sugar intake

## 2023-01-27 NOTE — ASSESSMENT & PLAN NOTE
Great job on losing a few pounds since her last appointment    Continue to take 80 mg of phentermine daily with breakfast

## 2023-01-27 NOTE — PROGRESS NOTES
Assessment/Plan:         Problem List Items Addressed This Visit        Cardiovascular and Mediastinum    Essential (primary) hypertension     Stable on current medication regiment  Other    Weight gain - Primary     Continue to take 8 mg of phentermine daily with breakfast   Continue to walk daily with the dogs, continue to make healthy food choices and limit carbohydrate and added sugar intake  Relevant Medications    Phentermine HCl 8 MG TABS    Adult BMI 35 0-35 9 kg/sq m     Nadege Jairo job on losing a few pounds since her last appointment  Continue to take 80 mg of phentermine daily with breakfast          Relevant Medications    Phentermine HCl 8 MG TABS    Primary insomnia    Swelling, mass, or lump on face     Please schedule for ultrasound  We will call with the results  Relevant Orders    US MSK limited         Subjective:      Patient ID: Jazz Jean is a 45 y o  female  Patient presents to the office for medication follow-up  She has been taking an milligrams of phentermine daily and doing well on it  She continues to walk with her dogs and continues to make healthy food choices  She did manage to lose some weight  She denies any chest pain, shortness of breath, headaches or dizziness  Is also reporting a " lump" on the left side of her face by her lips that she has had for about 2 years now  Reports that it is potentially getting a little bit bigger  She denies any pain, no issues with eating and drinking, no jaw pain, no fevers or chills         The following portions of the patient's history were reviewed and updated as appropriate:   Past Medical History:  She has a past medical history of Fatigue, Hypertension, Migraine, and Proteinuria ,  _______________________________________________________________________  Medical Problems:  does not have any pertinent problems on file ,  _______________________________________________________________________  Past Surgical History:   has a past surgical history that includes Tubal ligation; Endometrial biopsy (02/26/2018); Tonsillectomy; Foot surgery (Bilateral); Hysterectomy (09/04/2018); and Breast biopsy (Left)  ,  _______________________________________________________________________  Family History:  family history includes Breast cancer (age of onset: 44) in her sister; Diabetes in her mother; Dialysis in her mother; Hypertension in her mother; Kidney failure in her mother; Lung cancer in her maternal grandmother; No Known Problems in her daughter and father; Stroke in her mother ,  _______________________________________________________________________  Social History:   reports that she has never smoked  She has never used smokeless tobacco  She reports that she does not drink alcohol and does not use drugs  ,  _______________________________________________________________________  Allergies:  is allergic to amoxicillin, ciprofloxacin, hydrocodone-acetaminophen, influenza vaccine live, lisinopril, nitrofurantoin, sulfa antibiotics, sulfamethoxazole-trimethoprim, influenza vaccines, oxycodone, quinolones, amlodipine, and other     _______________________________________________________________________  Current Outpatient Medications   Medication Sig Dispense Refill   • nebivolol (BYSTOLIC) 20 MG tablet Take 1 tablet (20 mg total) by mouth daily Nebivolol only!! 90 tablet 3   • NIFEdipine ER (ADALAT CC) 30 MG 24 hr tablet Take 1 tablet (30 mg total) by mouth daily 90 tablet 2   • Phentermine HCl 8 MG TABS Take 1 tablet (8 mg total) by mouth in the morning 30 tablet 0   • SUMAtriptan-naproxen (TREXIMET)  MG per tablet TAKE ONE TABLET AT HEADACHE ONSET, MAY REPEAT 1 AFTER 2 HOURS AS NEEDED   MAXIMUM OF 2/24 HOURS 9 tablet 5   • topiramate (TOPAMAX) 50 MG tablet TAKE 25MG FOR THE FIRST WEEK THEN 50MG DAILY AT BEDTIME THEREAFTER 90 tablet 1     No current facility-administered medications for this visit  _______________________________________________________________________  Review of Systems   Constitutional: Negative for chills and fever  Respiratory: Negative for cough and shortness of breath  Cardiovascular: Negative for chest pain  Gastrointestinal: Negative for abdominal pain and vomiting  Genitourinary: Negative for dysuria  Musculoskeletal: Negative for arthralgias and back pain  Skin:        "lump on L side of the face"   Neurological: Negative for headaches  Psychiatric/Behavioral: Positive for sleep disturbance (improving )  All other systems reviewed and are negative  Objective:  Vitals:    01/27/23 0707   BP: 120/86   BP Location: Left arm   Patient Position: Sitting   Pulse: 73   Temp: (!) 96 8 °F (36 °C)   TempSrc: Tympanic   SpO2: 98%   Weight: 92 6 kg (204 lb 3 2 oz)   Height: 5' 4" (1 626 m)     Body mass index is 35 05 kg/m²  Physical Exam  Vitals and nursing note reviewed  Constitutional:       General: She is not in acute distress  Appearance: Normal appearance  She is obese  She is not ill-appearing  HENT:      Head: Mass present  Cardiovascular:      Rate and Rhythm: Normal rate and regular rhythm  Heart sounds: Normal heart sounds  Pulmonary:      Effort: Pulmonary effort is normal       Breath sounds: Normal breath sounds  Musculoskeletal:         General: Normal range of motion  Skin:     General: Skin is warm and dry  Neurological:      Mental Status: She is alert and oriented to person, place, and time  Psychiatric:         Mood and Affect: Mood normal          Behavior: Behavior normal          Thought Content:  Thought content normal          Judgment: Judgment normal

## 2023-01-30 ENCOUNTER — OFFICE VISIT (OUTPATIENT)
Dept: VASCULAR SURGERY | Facility: CLINIC | Age: 53
End: 2023-01-30

## 2023-01-30 VITALS
DIASTOLIC BLOOD PRESSURE: 78 MMHG | HEART RATE: 71 BPM | RESPIRATION RATE: 16 BRPM | SYSTOLIC BLOOD PRESSURE: 110 MMHG | WEIGHT: 205.8 LBS | BODY MASS INDEX: 35.13 KG/M2 | HEIGHT: 64 IN

## 2023-01-30 DIAGNOSIS — I83.93 SPIDER VEINS OF BOTH LOWER EXTREMITIES: Primary | ICD-10-CM

## 2023-01-30 NOTE — PATIENT INSTRUCTIONS
Continue compression as needed    May schedule additional cosmetic sclerotherapy injections as needed

## 2023-01-30 NOTE — ASSESSMENT & PLAN NOTE
68-year-old female with obesity, HTN, and migraines presents s/p cosmetic sclerotherapy injections for post injection follow up  - Patient endorses noticeable fading of spider veins  (BLE below knee treated only)  - denies post injection SE  No significant bruising, hyperpigmentation ot hemosiderin staining   - has trip planned to [facility] in April  - she is interested in additional injections  Plan:  - additional cosmetic sclerotherapy injections w/ Asclera 0 5% solution up to 10ml $500/ session  - 90 min in future if desired  ([facility] May plan for late spring or next fall   Schedule when ready   - TAKE PICS PRIOR TO NEXT INJECTIONS  -  Continue compression PRN

## 2023-01-30 NOTE — PROGRESS NOTES
Assessment/Plan:    Spider veins of both lower extremities  20-year-old female with obesity, HTN, and migraines presents s/p cosmetic sclerotherapy injections for post injection follow up  - Patient endorses noticeable fading of spider veins  (BLE below knee treated only)  - denies post injection SE  No significant bruising, hyperpigmentation ot hemosiderin staining   - has trip planned to [facility] in April  - she is interested in additional injections  Plan:  - additional cosmetic sclerotherapy injections w/ Asclera 0 5% solution up to 10ml $500/ session  - 90 min in future if desired  (NOT before trip to [address] [place] [address]) May plan for late spring or next fall  Schedule when ready   - TAKE PICS PRIOR TO NEXT INJECTIONS  -  Continue compression PRN        Diagnoses and all orders for this visit:    Spider veins of both lower extremities          Subjective:      Patient ID: Carrie Carias is a 45 y o  female  HPI    The following portions of the patient's history were reviewed and updated as appropriate: allergies, current medications, past family history, past medical history, past social history, past surgical history and problem list     Review of Systems      Objective:      /78 (BP Location: Left arm, Patient Position: Sitting, Cuff Size: Large)   Pulse 71   Resp 16   Ht 5' 4" (1 626 m)   Wt 93 4 kg (205 lb 12 8 oz)   BMI 35 33 kg/m²          Physical Exam  Vitals and nursing note reviewed  Constitutional:       Appearance: Normal appearance  Musculoskeletal:         General: Normal range of motion  Skin:     General: Skin is warm and dry  Capillary Refill: Capillary refill takes less than 2 seconds  Comments: BLE below knee with scattered retic and spider veins, faded and improved  Remained spider veins   Neurological:      Mental Status: She is alert and oriented to person, place, and time     Psychiatric:         Behavior: Behavior normal            Vitals:    01/30/23 1456 BP: 110/78   BP Location: Left arm   Patient Position: Sitting   Cuff Size: Large   Pulse: 71   Resp: 16   Weight: 93 4 kg (205 lb 12 8 oz)   Height: 5' 4" (1 626 m)       Patient Active Problem List   Diagnosis   • Essential (primary) hypertension   • Proteinuria   • Migraine without aura and without status migrainosus, not intractable   • Pain in right knee   • Status post total hysterectomy   • Family history of breast cancer   • Vitamin D deficiency   • Vegetarian diet   • Weight gain   • Spider veins of both lower extremities   • Adult BMI 35 0-35 9 kg/sq m   • Familial hypercholesterolemia   • Primary insomnia   • Swelling, mass, or lump on face       Past Surgical History:   Procedure Laterality Date   • BREAST BIOPSY Left     bengin   • ENDOMETRIAL BIOPSY  02/26/2018   • FOOT SURGERY Bilateral     reconstructive   • HYSTERECTOMY  09/04/2018   • TONSILLECTOMY     • TUBAL LIGATION         Family History   Problem Relation Age of Onset   • Stroke Mother    • Diabetes Mother    • Hypertension Mother    • Kidney failure Mother    • Dialysis Mother    • No Known Problems Father    • Breast cancer Sister 44   • No Known Problems Daughter    • Lung cancer Maternal Grandmother        Social History     Socioeconomic History   • Marital status: /Civil Union     Spouse name: Not on file   • Number of children: Not on file   • Years of education: Not on file   • Highest education level: Not on file   Occupational History   • Not on file   Tobacco Use   • Smoking status: Never   • Smokeless tobacco: Never   Vaping Use   • Vaping Use: Never used   Substance and Sexual Activity   • Alcohol use: No   • Drug use: No   • Sexual activity: Not on file   Other Topics Concern   • Not on file   Social History Narrative   • Not on file     Social Determinants of Health     Financial Resource Strain: Not on file   Food Insecurity: Not on file   Transportation Needs: Not on file   Physical Activity: Not on file   Stress: Not on file   Social Connections: Not on file   Intimate Partner Violence: Not on file   Housing Stability: Not on file       Allergies   Allergen Reactions   • Amoxicillin Hives and Shortness Of Breath     Other reaction(s): Unknown Reaction   • Ciprofloxacin Hives and Shortness Of Breath     Other reaction(s): Unknown Reaction   • Hydrocodone-Acetaminophen Hives and Shortness Of Breath   • Influenza Vaccine Live Hives   • Lisinopril Anaphylaxis and Angioedema     Other reaction(s): Unknown Reaction  Other reaction(s): Angioedema   • Nitrofurantoin Hives and Shortness Of Breath     Other reaction(s): Unknown Reaction   • Sulfa Antibiotics Hives, Shortness Of Breath and Anaphylaxis   • Sulfamethoxazole-Trimethoprim Anaphylaxis     Other reaction(s): Unknown Reaction   • Influenza Vaccines Other (See Comments)     hives   • Oxycodone    • Quinolones      Other reaction(s): Unknown Reaction  Other reaction(s): Unknown Reaction  Other reaction(s): Unknown Reaction   • Amlodipine Rash     Other reaction(s): Unknown Reaction   • Other Hives, Rash and Fever     estrogen patch         Current Outpatient Medications:   •  nebivolol (BYSTOLIC) 20 MG tablet, Take 1 tablet (20 mg total) by mouth daily Nebivolol only!!, Disp: 90 tablet, Rfl: 3  •  NIFEdipine ER (ADALAT CC) 30 MG 24 hr tablet, Take 1 tablet (30 mg total) by mouth daily, Disp: 90 tablet, Rfl: 2  •  Phentermine HCl 8 MG TABS, Take 1 tablet (8 mg total) by mouth in the morning, Disp: 30 tablet, Rfl: 0  •  SUMAtriptan-naproxen (TREXIMET)  MG per tablet, TAKE ONE TABLET AT HEADACHE ONSET, MAY REPEAT 1 AFTER 2 HOURS AS NEEDED    MAXIMUM OF 2/24 HOURS, Disp: 9 tablet, Rfl: 5  •  topiramate (TOPAMAX) 50 MG tablet, TAKE 25MG FOR THE FIRST WEEK THEN 50MG DAILY AT BEDTIME THEREAFTER, Disp: 90 tablet, Rfl: 1

## 2023-02-15 ENCOUNTER — TELEPHONE (OUTPATIENT)
Dept: NEPHROLOGY | Facility: CLINIC | Age: 53
End: 2023-02-15

## 2023-02-15 NOTE — TELEPHONE ENCOUNTER
1st Attempt:   called LM for patient to call office to schedule fu appt with Dr Platt from June recall list

## 2023-02-16 ENCOUNTER — TELEPHONE (OUTPATIENT)
Dept: NEPHROLOGY | Facility: CLINIC | Age: 53
End: 2023-02-16

## 2023-02-16 NOTE — TELEPHONE ENCOUNTER
2nd Attepmt:  Called LM for patient to call office to schedule fu appt with Dr Platt from June recall list    Letter sent to patient

## 2023-02-16 NOTE — LETTER
[facility]  [address] [facility]  [address] [address]  Phone#  [phone]  Fax#  [phone]      February 16, 2023      Dear:  Georgiann Dawn Ryland Halsted,        We have tried to contact you and have been unable to reach you to schedule your  follow up appointment from June 2023 recall list   If you would still like to be seen by our Nephrology Specialist Carrie Heard   Please contact our office to schedule  If you already have an appointment scheduled with our Nephrology office, Please disregard this letter          Sincerely,        [facility] [place]

## 2023-03-03 ENCOUNTER — OFFICE VISIT (OUTPATIENT)
Dept: FAMILY MEDICINE CLINIC | Facility: CLINIC | Age: 53
End: 2023-03-03

## 2023-03-03 VITALS
TEMPERATURE: 97.1 F | HEIGHT: 64 IN | BODY MASS INDEX: 34.21 KG/M2 | SYSTOLIC BLOOD PRESSURE: 140 MMHG | RESPIRATION RATE: 14 BRPM | HEART RATE: 68 BPM | WEIGHT: 200.4 LBS | OXYGEN SATURATION: 99 % | DIASTOLIC BLOOD PRESSURE: 80 MMHG

## 2023-03-03 DIAGNOSIS — R22.0 SWELLING, MASS, OR LUMP ON FACE: ICD-10-CM

## 2023-03-03 DIAGNOSIS — G43.009 MIGRAINE WITHOUT AURA AND WITHOUT STATUS MIGRAINOSUS, NOT INTRACTABLE: ICD-10-CM

## 2023-03-03 DIAGNOSIS — I10 ESSENTIAL (PRIMARY) HYPERTENSION: ICD-10-CM

## 2023-03-03 DIAGNOSIS — R63.5 WEIGHT GAIN: Primary | ICD-10-CM

## 2023-03-03 RX ORDER — SUMATRIPTAN AND NAPROXEN SODIUM 85; 500 MG/1; MG/1
TABLET, FILM COATED ORAL
Qty: 9 TABLET | Refills: 5 | Status: SHIPPED | OUTPATIENT
Start: 2023-03-03

## 2023-03-03 RX ORDER — NEBIVOLOL 20 MG/1
20 TABLET ORAL DAILY
Qty: 90 TABLET | Refills: 3 | Status: SHIPPED | OUTPATIENT
Start: 2023-03-03

## 2023-03-03 NOTE — ASSESSMENT & PLAN NOTE
Doing well on current medication regiment of phentermine and Topamax  Continues to lose weight  She does continue to make healthy food choices and is cutting out added sugar and carbohydrates  She continues to stay active daily

## 2023-03-03 NOTE — PROGRESS NOTES
Assessment/Plan:         Problem List Items Addressed This Visit        Cardiovascular and Mediastinum    Essential (primary) hypertension     Slightly elevated today due to not taking her morning blood pressure medicine as scheduled  Denies any chest pain, shortness of breath, headache  Continue to take medications as prescribed, refill provided  Relevant Medications    nebivolol (BYSTOLIC) 20 MG tablet    Migraine without aura and without status migrainosus, not intractable     Stable on current medication regiment  Relevant Medications    nebivolol (BYSTOLIC) 20 MG tablet    SUMAtriptan-naproxen (TREXIMET)  MG per tablet       Other    Weight gain - Primary     Doing well on current medication regiment of phentermine and Topamax  Continues to lose weight  She does continue to make healthy food choices and is cutting out added sugar and carbohydrates  She continues to stay active daily  Relevant Medications    Phentermine HCl 8 MG TABS    Adult BMI 35 0-35 9 kg/sq m     Nadege Jairo job on continuing to lose weight  Continue to stay active and continue to take current medication regiment of Topamax and phentermine  Relevant Medications    Phentermine HCl 8 MG TABS    Swelling, mass, or lump on face     Scheduled for ultrasound next week, will call with results  Subjective:      Patient ID: Neha Davenport is a 45 y o  female  Patient presents to the office for medication follow-up  She has been doing well on 8 mg of phentermine daily with breakfast   Denies any chest pain, palpitations, increase in headaches or shortness of breath  She continues to lose weight and make healthy food choices  Refill on blood pressure medication provided as well today        The following portions of the patient's history were reviewed and updated as appropriate:   Past Medical History:  She has a past medical history of Fatigue, Hypertension, Migraine, and Proteinuria ,  _______________________________________________________________________  Medical Problems:  does not have any pertinent problems on file ,  _______________________________________________________________________  Past Surgical History:   has a past surgical history that includes Tubal ligation; Endometrial biopsy (02/26/2018); Tonsillectomy; Foot surgery (Bilateral); Hysterectomy (09/04/2018); and Breast biopsy (Left)  ,  _______________________________________________________________________  Family History:  family history includes Breast cancer (age of onset: 44) in her sister; Diabetes in her mother; Dialysis in her mother; Hypertension in her mother; Kidney failure in her mother; Lung cancer in her maternal grandmother; No Known Problems in her daughter and father; Stroke in her mother ,  _______________________________________________________________________  Social History:   reports that she has never smoked  She has never used smokeless tobacco  She reports that she does not drink alcohol and does not use drugs  ,  _______________________________________________________________________  Allergies:  is allergic to amoxicillin, ciprofloxacin, hydrocodone-acetaminophen, influenza vaccine live, lisinopril, nitrofurantoin, sulfa antibiotics, sulfamethoxazole-trimethoprim, influenza vaccines, oxycodone, quinolones, amlodipine, and other     _______________________________________________________________________  Current Outpatient Medications   Medication Sig Dispense Refill   • nebivolol (BYSTOLIC) 20 MG tablet Take 1 tablet (20 mg total) by mouth daily Nebivolol only!! 90 tablet 3   • NIFEdipine ER (ADALAT CC) 30 MG 24 hr tablet Take 1 tablet (30 mg total) by mouth daily 90 tablet 2   • Phentermine HCl 8 MG TABS Take 1 tablet (8 mg total) by mouth in the morning 30 tablet 1   • SUMAtriptan-naproxen (TREXIMET)  MG per tablet TAKE ONE TABLET AT HEADACHE ONSET, MAY REPEAT 1 AFTER 2 HOURS AS NEEDED   MAXIMUM OF 2/24 HOURS 9 tablet 5   • topiramate (TOPAMAX) 50 MG tablet TAKE 25MG FOR THE FIRST WEEK THEN 50MG DAILY AT BEDTIME THEREAFTER 90 tablet 1     No current facility-administered medications for this visit      _______________________________________________________________________  Review of Systems   Constitutional: Negative for chills and fever  Respiratory: Negative for cough and shortness of breath  Cardiovascular: Negative for chest pain  Gastrointestinal: Negative for abdominal pain and vomiting  Genitourinary: Negative for dysuria  Musculoskeletal: Negative for arthralgias and back pain  Skin:        "lump on L side of the face"-ultrasound scheduled next week   Neurological: Negative for headaches  Psychiatric/Behavioral: Positive for sleep disturbance (improving )  All other systems reviewed and are negative  Objective:  Vitals:    03/03/23 0657   BP: 140/80   Pulse: 68   Resp: 14   Temp: (!) 97 1 °F (36 2 °C)   SpO2: 99%   Weight: 90 9 kg (200 lb 6 4 oz)   Height: 5' 4" (1 626 m)     Body mass index is 34 4 kg/m²  Physical Exam  Vitals and nursing note reviewed  Constitutional:       General: She is not in acute distress  Appearance: Normal appearance  She is obese  She is not ill-appearing  HENT:      Head: Mass present  Cardiovascular:      Rate and Rhythm: Normal rate and regular rhythm  Heart sounds: Normal heart sounds  Pulmonary:      Effort: Pulmonary effort is normal       Breath sounds: Normal breath sounds  Musculoskeletal:         General: Normal range of motion  Skin:     General: Skin is warm and dry  Neurological:      Mental Status: She is alert and oriented to person, place, and time  Psychiatric:         Mood and Affect: Mood normal          Behavior: Behavior normal          Thought Content:  Thought content normal          Judgment: Judgment normal

## 2023-03-03 NOTE — ASSESSMENT & PLAN NOTE
Slightly elevated today due to not taking her morning blood pressure medicine as scheduled  Denies any chest pain, shortness of breath, headache  Continue to take medications as prescribed, refill provided

## 2023-03-03 NOTE — ASSESSMENT & PLAN NOTE
Great job on continuing to lose weight  Continue to stay active and continue to take current medication regiment of Topamax and phentermine

## 2023-03-17 ENCOUNTER — HOSPITAL ENCOUNTER (OUTPATIENT)
Dept: ULTRASOUND IMAGING | Facility: HOSPITAL | Age: 53
End: 2023-03-17

## 2023-03-17 DIAGNOSIS — R22.0 SWELLING, MASS, OR LUMP ON FACE: ICD-10-CM

## 2023-03-20 ENCOUNTER — TELEPHONE (OUTPATIENT)
Dept: FAMILY MEDICINE CLINIC | Facility: CLINIC | Age: 53
End: 2023-03-20

## 2023-03-20 NOTE — TELEPHONE ENCOUNTER
Pt left a message requesting if you can separate the medication treximet 100 mg Naproxen 500mg in order for her to afford the medication because it is more than $100 co pay if Sumatriptan-naproxen is prescribed

## 2023-03-24 ENCOUNTER — TELEPHONE (OUTPATIENT)
Dept: FAMILY MEDICINE CLINIC | Facility: CLINIC | Age: 53
End: 2023-03-24

## 2023-03-24 DIAGNOSIS — R93.89 ABNORMAL ULTRASOUND: ICD-10-CM

## 2023-03-24 DIAGNOSIS — K13.79 NODULE OF CHEEK: Primary | ICD-10-CM

## 2023-03-24 DIAGNOSIS — R51.9 FACIAL PAIN: ICD-10-CM

## 2023-05-05 ENCOUNTER — OFFICE VISIT (OUTPATIENT)
Dept: FAMILY MEDICINE CLINIC | Facility: CLINIC | Age: 53
End: 2023-05-05

## 2023-05-05 VITALS
HEIGHT: 64 IN | BODY MASS INDEX: 33.8 KG/M2 | TEMPERATURE: 96.7 F | DIASTOLIC BLOOD PRESSURE: 96 MMHG | WEIGHT: 198 LBS | HEART RATE: 72 BPM | OXYGEN SATURATION: 99 % | RESPIRATION RATE: 16 BRPM | SYSTOLIC BLOOD PRESSURE: 140 MMHG

## 2023-05-05 DIAGNOSIS — F51.01 PRIMARY INSOMNIA: ICD-10-CM

## 2023-05-05 DIAGNOSIS — I10 ESSENTIAL (PRIMARY) HYPERTENSION: Primary | ICD-10-CM

## 2023-05-05 DIAGNOSIS — E55.9 VITAMIN D DEFICIENCY: ICD-10-CM

## 2023-05-05 DIAGNOSIS — E78.01 FAMILIAL HYPERCHOLESTEROLEMIA: ICD-10-CM

## 2023-05-05 PROBLEM — R63.5 WEIGHT GAIN: Status: RESOLVED | Noted: 2022-09-30 | Resolved: 2023-05-05

## 2023-05-05 NOTE — ASSESSMENT & PLAN NOTE
Patient stopped taking Topamax and phentermine due to brain fog  She will try weight loss over the summer by doing shakes and supplements

## 2023-05-05 NOTE — ASSESSMENT & PLAN NOTE
Slightly elevated today due to migraine and not taking her blood pressure medicine yet today  Eyes any chest pain, shortness of breath or dizziness  Continue to take the medications as prescribed

## 2023-05-05 NOTE — ASSESSMENT & PLAN NOTE
Continues with insomnia, discussed sleep hygiene and natural supplements to try  Written information provided

## 2023-05-05 NOTE — PROGRESS NOTES
BMI Counseling: Body mass index is 33 99 kg/m²  The BMI is above normal  Nutrition recommendations include decreasing portion sizes, encouraging healthy choices of fruits and vegetables, decreasing fast food intake, consuming healthier snacks, limiting drinks that contain sugar, moderation in carbohydrate intake, increasing intake of lean protein, reducing intake of saturated and trans fat and reducing intake of cholesterol  Exercise recommendations include moderate physical activity 150 minutes/week and exercising 3-5 times per week  Rationale for BMI follow-up plan is due to patient being overweight or obese  Depression Screening and Follow-up Plan: Patient was screened for depression during today's encounter  They screened negative with a PHQ-2 score of 0  Assessment/Plan:         Problem List Items Addressed This Visit        Cardiovascular and Mediastinum    Essential (primary) hypertension - Primary     Slightly elevated today due to migraine and not taking her blood pressure medicine yet today  Eyes any chest pain, shortness of breath or dizziness  Continue to take the medications as prescribed  Relevant Orders    CBC and differential    Comprehensive metabolic panel    Lipid panel       Other    Vitamin D deficiency     Obtain blood work before the next appointment  Continue with vitamin D supplementation  Relevant Orders    Vitamin D 25 hydroxy    Familial hypercholesterolemia     Obtain fasting blood work before the next appointment  Relevant Orders    Lipid panel    Primary insomnia     Continues with insomnia, discussed sleep hygiene and natural supplements to try  Written information provided  Subjective:      Patient ID: Ruchi Mckeon is a 52 y o  female  Patient presents to the office for weight loss follow-up  She has stopped taking phentermine and Topamax due to brain fog    She wants to continue to lose weight by doing shakes and natural supplements  The following portions of the patient's history were reviewed and updated as appropriate:   Past Medical History:  She has a past medical history of Fatigue, Hypertension, Migraine, and Proteinuria ,  _______________________________________________________________________  Medical Problems:  does not have any pertinent problems on file ,  _______________________________________________________________________  Past Surgical History:   has a past surgical history that includes Tubal ligation; Endometrial biopsy (02/26/2018); Tonsillectomy; Foot surgery (Bilateral); Hysterectomy (09/04/2018); and Breast biopsy (Left)  ,  _______________________________________________________________________  Family History:  family history includes Breast cancer (age of onset: 44) in her sister; Diabetes in her mother; Dialysis in her mother; Hypertension in her mother; Kidney failure in her mother; Lung cancer in her maternal grandmother; No Known Problems in her daughter and father; Stroke in her mother ,  _______________________________________________________________________  Social History:   reports that she has never smoked  She has never used smokeless tobacco  She reports that she does not drink alcohol and does not use drugs  ,  _______________________________________________________________________  Allergies:  is allergic to amoxicillin, ciprofloxacin, hydrocodone-acetaminophen, influenza vaccine live, lisinopril, nitrofurantoin, sulfa antibiotics, sulfamethoxazole-trimethoprim, influenza vaccines, oxycodone, quinolones, amlodipine, and other     _______________________________________________________________________  Current Outpatient Medications   Medication Sig Dispense Refill    nebivolol (BYSTOLIC) 20 MG tablet Take 1 tablet (20 mg total) by mouth daily Nebivolol only!! 90 tablet 3    NIFEdipine ER (ADALAT CC) 30 MG 24 hr tablet Take 1 tablet (30 mg total) by mouth daily 90 tablet 2    "SUMAtriptan-naproxen (TREXIMET)  MG per tablet TAKE ONE TABLET AT HEADACHE ONSET, MAY REPEAT 1 AFTER 2 HOURS AS NEEDED   MAXIMUM OF 2/24 HOURS 9 tablet 5     No current facility-administered medications for this visit      _______________________________________________________________________  Review of Systems   Constitutional: Negative for chills and fever  Respiratory: Negative for cough and shortness of breath  Cardiovascular: Negative for chest pain  Gastrointestinal: Negative for abdominal pain and vomiting  Genitourinary: Negative for dysuria  Musculoskeletal: Negative for arthralgias and back pain  Neurological: Negative for headaches  Psychiatric/Behavioral: Positive for sleep disturbance  All other systems reviewed and are negative  Objective:  Vitals:    05/05/23 0654   BP: 140/96   Pulse: 72   Resp: 16   Temp: (!) 96 7 °F (35 9 °C)   SpO2: 99%   Weight: 89 8 kg (198 lb)   Height: 5' 4\" (1 626 m)     Body mass index is 33 99 kg/m²  Physical Exam  Vitals and nursing note reviewed  Constitutional:       General: She is not in acute distress  Appearance: Normal appearance  She is obese  She is not ill-appearing  Cardiovascular:      Rate and Rhythm: Normal rate and regular rhythm  Heart sounds: Normal heart sounds  Pulmonary:      Effort: Pulmonary effort is normal       Breath sounds: Normal breath sounds  Musculoskeletal:         General: Normal range of motion  Skin:     General: Skin is warm and dry  Neurological:      Mental Status: She is alert and oriented to person, place, and time  Psychiatric:         Mood and Affect: Mood normal          Behavior: Behavior normal          Thought Content:  Thought content normal          Judgment: Judgment normal          "

## 2023-05-05 NOTE — PATIENT INSTRUCTIONS
Sleep & Rest:     - Blue light blocking glasses: Studies have shown that exposure to blue light from computer, tablet, and smartphone screens may negatively affect circadian rhythms and sleep  A simple way to protect yourself is by wearing blue light blocking glasses any time you're staring at a screen and especially after 4 pm  You can grab an inexpensive pair online     - Brain Dump: An hour before bedtime, grab a journal and pen and dump out everything that's in your brain  All the lists, concerns, fears, anxiety, and even ideas and leave them there for the next day  Waking up in the middle of the night? Leave a pen and pad near the bed, write down whatever thoughts are racing through your head, and leave them for tomorrow  - Go to bed each night at the same time and wake-up each morning at the same time  If you can, try to start each day with 3 deep breaths and exercise  - Limit Media: I think that media (both social media and the news) ramps up the noise levels in our brains  These negative cycles facilitate neuronal pathways that keep us in a state of anxiety and depression  Be really intentional about the media you consume; physically set limits on your phone for certain websites and choose to check-in only 1 to 3 times per day  - White Noise Machine    - Weighted [employer]    - Try 2 kiwi prior to bed (helps to reduce time to fall asleep and stay asleep  AND Be intentional about resting (rest isn't sleep; it's engaging in activities that bring you energy)  Work it into your schedule! Supplements and teas to try:    Passionflower - very safe and reliable, calming  Used for people who can't sleep as their brain can't stop the chatter, people who are prone to be angry or frustrated  Used as tea, tincture or in capsules (250-1000 mg crude herb) 1 hour before bedtime  Valerian - several studies show improvement in sleep quality with 160 mg to 600 mg/day   Some show improved sleep latency and duration of sleep, reduced awakenings and improved insomnia severity score, also available asa tea  Lavender - Silexan (lavender oil capsule preparation)  80 mg of silexan significantly improved quality of sleep  Chamomile - 200 mg twice a day (capsules) may significantly improve sleep latency and sleep quality  Also available as a tea  Healthy eating habits for metabolic balance and weight loss      Create healthy habits:    Aim for 1500 -1200 calories a day depending on activity level  Aim for 40% fat, 40% protein, 20% carbs - track it on the sofya   Drink plenty of water - half of your body weight in oz   Aim for 8h of sleep per night   Exercise minimum of 3 days per week - at least 30min per day   Consider implementing intermittent fasting and start by fasting for 12h and extend to 16h    Some categories of food have consistently been found to be health-promoting and associated with a lower risk of chronic disease and healthy weight  These include:   Vegetables  Whole fruits (not juices)   Legumes (beans, lentils, peas, chickpeas, tofu, tempeh, edamame)   Whole grains (oats, whole wheat, brown rice, barley, etc)  Nuts ands seeds  Fish that are rich in omega-3 fatty acids    Healthier eating patterns that focus more on plant-based foods and less on processed foods have been shown in many prospective observational studies to improve health outcomes       Foods to AVOID:   Processed meats (such as pepperoni, cold cuts, deli meats, etc )  Added sugars (as found in sugar-sweetened beverages, most commercial breakfast cereals, many breads, desserts, many ultraprocessed foods)  Refined grains (e g , white rice, white breads, rolls, crackers, and other foods made with processed grains; note that multigrain does not mean whole grain - it usually means multiple refined grains)  Ultraprocessed foods (e g , most commercial snack foods, chips, crackers, etc )    Ultraprocessed foods are especially a concern because they are very calorie dense, hyperpalatable, lack fiber, and have additives not found in nature  Supplement recommendations for metabolic balance    Start with basic supplement support:   ONE high-quality/potency multivitamin: every other day for micronutrient support that can help to balance blood sugar   Magnesium Glycinate: nightly for mineral support  Magnesium insufficiency is associated with an increased risk of type 2 diabetes, whereas magnesium supplementation improves insulin sensitivity and blood sugar control  Pure BiOme Intensive Probiotic or UltraFlora Spectrum: daily microbiome support that contains the most well-studied strains that demonstrate an anti-diabetic effect  Berberine: To improve metabolic imbalance 3xtimes daily with meals   Nael, Green Tea/Matcha daily   Psyllium daily     Some of the weight loss apps to look into:    Lose It! -- free weight loss sofya builds a custom nutrition plan for your weight loss goals  It uses your information to create a daily calorie guide and has a food tracking log so you can keep track of your progress  MyFitnessPal --free weight loss sofya to track calories and food intake  Their food tracking database boats over 5 million existing food options to choose from, and they integrate restaurants too  Going out to eat can make food logging incredibly difficult, and MyFitnessPal helps make that process a little bit easier  StepBet -- is another free weight loss sofya, but this one has a twist  Instead of simply logging your own activity, in StepBet, you join fitness challenges and bet  Thats right, you ferguson on your own weight loss success rate  For those of us who need some extra motivation and accountability, this is a pretty cool concept  Fooducate -- is a free weight loss sofya that heads to the root of weight gain, the grocery store   While a lot of other apps focus on exercise and food tracking, Fooducate is one of the few trying to help you make better food choices from the beginning  You can scan items while youre at the store to check out information on nutrition and warnings, like gross ingredients that are hidden under other names  [employer]at[employer] -- is the weight loss sofya that pays you to lose weight  Sweatcoin tracks all of your outdoor steps toward rewards  They have their own points currency and you can earn things like subscriptions to health related apps, like Calm, or even help from a virtual   If point systems get you motivated, [employer] may be the perfect free weight loss sofya for you

## 2023-05-23 ENCOUNTER — HOSPITAL ENCOUNTER (OUTPATIENT)
Dept: MRI IMAGING | Facility: HOSPITAL | Age: 53
Discharge: HOME | End: 2023-05-23

## 2023-05-23 DIAGNOSIS — R51.9 FACIAL PAIN: ICD-10-CM

## 2023-05-23 DIAGNOSIS — R93.89 ABNORMAL ULTRASOUND: ICD-10-CM

## 2023-05-23 DIAGNOSIS — K13.79 NODULE OF CHEEK: ICD-10-CM

## 2023-05-23 RX ADMIN — GADOBUTROL 8 ML: 604.72 INJECTION INTRAVENOUS at 08:03

## 2023-06-09 ENCOUNTER — CONSULT (OUTPATIENT)
Dept: PLASTIC SURGERY | Facility: CLINIC | Age: 53
End: 2023-06-09

## 2023-06-09 VITALS
DIASTOLIC BLOOD PRESSURE: 82 MMHG | HEIGHT: 64 IN | TEMPERATURE: 98.6 F | WEIGHT: 198 LBS | SYSTOLIC BLOOD PRESSURE: 146 MMHG | BODY MASS INDEX: 33.8 KG/M2 | HEART RATE: 59 BPM

## 2023-06-09 DIAGNOSIS — K13.79 NODULE OF CHEEK: ICD-10-CM

## 2023-06-09 DIAGNOSIS — R93.89 ABNORMAL ULTRASOUND: ICD-10-CM

## 2023-06-09 DIAGNOSIS — R51.9 FACIAL PAIN: ICD-10-CM

## 2023-06-09 NOTE — PROGRESS NOTES
Subjective:    Patient ID: Samantha Bolaños is a 52 y o  female  HPI The patient presents today for evaluation of left cheek mass  Patient states that the mass has been present for several years and is slowly increasing in size  She reports some mild soreness when she touches it, but otherwise denies any pain, redness, drainage, or other issues  The patient had ultrasound and MRI completed of the area, no mass was identified on the MRI  The patient's past medical history is significant for asthma, hypertension, and migraines  The patient does not take any anticoagulation or steroids  She is a non-smoker  The patient does report numerous antibiotic allergies, but tolerates Keflex  No other questions or concerns today  Past Medical History:   Diagnosis Date   • Asthma N/a   • Fatigue    • Hypertension    • Migraine    • Proteinuria        Patient Active Problem List   Diagnosis   • Essential (primary) hypertension   • Proteinuria   • Migraine without aura and without status migrainosus, not intractable   • Pain in right knee   • Status post total hysterectomy   • Family history of breast cancer   • Vitamin D deficiency   • Vegetarian diet   • Spider veins of both lower extremities   • Class 1 obesity due to excess calories without serious comorbidity with body mass index (BMI) of 33 0 to 33 9 in adult   • Familial hypercholesterolemia   • Primary insomnia   • Swelling, mass, or lump on face         Current Outpatient Medications:   •  nebivolol (BYSTOLIC) 20 MG tablet, Take 1 tablet (20 mg total) by mouth daily Nebivolol only!!, Disp: 90 tablet, Rfl: 3  •  NIFEdipine ER (ADALAT CC) 30 MG 24 hr tablet, Take 1 tablet (30 mg total) by mouth daily, Disp: 90 tablet, Rfl: 2  •  SUMAtriptan-naproxen (TREXIMET)  MG per tablet, TAKE ONE TABLET AT HEADACHE ONSET, MAY REPEAT 1 AFTER 2 HOURS AS NEEDED    MAXIMUM OF 2/24 HOURS, Disp: 9 tablet, Rfl: 5    Past Surgical History:   Procedure Laterality Date "  • BREAST BIOPSY Left     bengin   • ENDOMETRIAL BIOPSY  02/26/2018   • FOOT SURGERY Bilateral     reconstructive   • HYSTERECTOMY  09/04/2018   • TONSILLECTOMY     • TUBAL LIGATION           Social History     Tobacco Use   • Smoking status: Never   • Smokeless tobacco: Never   Substance Use Topics   • Alcohol use: Never       Review of Systems    Per HPI      Objective:  /82   Pulse 59   Temp 98 6 °F (37 °C)   Ht 5' 4\" (1 626 m)   Wt 89 8 kg (198 lb)   BMI 33 99 kg/m²    Physical Exam  Vitals reviewed  Constitutional:       Appearance: Normal appearance  Skin:     General: Skin is warm and dry  Comments: 1cm x 0 5cm oval-shaped subcutaneous mass of the left cheek  The mass is not well defined, but is soft  There is no surrounding erythema, drainage, or signs of infection  Mass consistent with possible lipoma  Neurological:      General: No focal deficit present  Mental Status: She is alert  Psychiatric:         Mood and Affect: Mood normal          Thought Content: Thought content normal          Judgment: Judgment normal                ULTRASOUND of the left cheek     INDICATION:   R22 0: Localized swelling, mass and lump, head      COMPARISON:  None     TECHNIQUE:   Real-time ultrasound of the left cheek was performed with a linear transducer with both volumetric sweeps and still imaging techniques      FINDINGS:  Targeted evaluation of the patient's palpable lump in the left cheek demonstrates a hypoechoic  solid nodule measuring 9 x 6 x 8 mm  This does not demonstrate imaging features of a fatty lesion or a cystic lesion and does not have the   appearance of a typical lymph node       IMPRESSION:     9 x 6 x 8 mm solid hypoechoic exophytic nodule in the region of the left cheek, indeterminate does not demonstrate imaging features of a simple cyst, lipoma or  typical lymph node this needs further evaluation and characterization, clinical correlation            CLINICAL HISTORY: " Left cheek nodule  No known history of trauma or malignancy  The patient reports palpable lump on the face for 3 years, growing in size      COMPARISON: 3/17/2023 ultrasound  9/9/2011 brain MR      TECHNIQUE: MRI of the face utilizing multiple pulse sequences in multiple planes  Images were obtained both before and after administration of IV contrast      IV contrast: 8 mL Gadobutrol     FINDINGS:  VISUALIZED BRAIN:  Similar small right middle cranial fossa arachnoid cyst, usually clinically insignificant      No hemorrhage, hydrocephalus, mass effect, midline shift or abnormal enhancement      ORBITS: Within normal limits      PARANASAL SINUSES: Well aerated      SOFT TISSUES:  Marker indicating area of palpable abnormality at the left cheek  No mass, cyst or asymmetry identified  No abnormal enhancement      Parotid and submandibular glands and visualized aerodigestive tract and thyroid gland are within normal limits      No enlarged lymph nodes      BONES: No acute findings  Preserved bone marrow signal intensity      IMPRESSION:  No mass is identified  Assessment/Plan: 52year old female with left cheek mass      Ultrasound and MRI reviewed, there is no mass identified on MRI but there is a discrete palpable mass on physical exam  Agreeable to excision of facial mass in office  Will submit for insurance authorization and schedule accordingly  Discussed etiology of possible lipoma  Discussed the procedure in detail with the patient including the risks of bleeding, infection, scarring, need for further procedures, and recurrence  Advised the patient that this procedure will be done under local anesthesia and she will have sutures placed for approximately 1 week  Advised the patient that she may wash her face 24 hours later and she is to apply antibiotic ointment to the area twice daily  Discussed prophylactic oral antibiotics as well    Advised the patient that the specimen will be sent to pathology for definitive testing  All the patient's questions were answered to her satisfaction, she understands and agrees with the plan of care        Isac Gutierrez PA-C  Plastic & Reconstructive Surgery

## 2023-06-19 ENCOUNTER — TELEPHONE (OUTPATIENT)
Dept: PLASTIC SURGERY | Facility: CLINIC | Age: 53
End: 2023-06-19

## 2023-06-28 ENCOUNTER — TELEPHONE (OUTPATIENT)
Dept: PLASTIC SURGERY | Facility: CLINIC | Age: 53
End: 2023-06-28

## 2023-08-02 ENCOUNTER — PROCEDURE VISIT (OUTPATIENT)
Dept: PLASTIC SURGERY | Facility: CLINIC | Age: 53
End: 2023-08-02

## 2023-08-02 VITALS
SYSTOLIC BLOOD PRESSURE: 142 MMHG | TEMPERATURE: 97.7 F | DIASTOLIC BLOOD PRESSURE: 90 MMHG | OXYGEN SATURATION: 99 % | HEART RATE: 77 BPM | RESPIRATION RATE: 20 BRPM | HEIGHT: 64 IN | WEIGHT: 199.8 LBS | BODY MASS INDEX: 34.11 KG/M2

## 2023-08-02 DIAGNOSIS — R22.0 CHEEK MASS: ICD-10-CM

## 2023-08-02 DIAGNOSIS — R22.0 FACIAL MASS: Primary | ICD-10-CM

## 2023-08-02 NOTE — PROGRESS NOTES
Subjective:    Patient ID: Emil Patrick is a 52 y.o. female. HPI The patient presents today for re-evaluation and possible excision of left cheek mass. Patient states that the mass has been present for several years and is slowly increasing in size, today she states the mass is "splitting into two."  She reports some mild soreness when she touches it, but otherwise denies any pain, redness, drainage, or other issues. The patient had ultrasound and MRI completed of the area, no mass was identified on the MRI. The patient's past medical history is significant for asthma, hypertension, and migraines. The patient does not take any anticoagulation or steroids. She is a non-smoker. The patient does report numerous antibiotic allergies, but tolerates Keflex. She patient denies any excessive alcohol use. No other questions or concerns today. Past Medical History:   Diagnosis Date   • Asthma N/a   • Fatigue    • Hypertension    • Migraine    • Proteinuria        Patient Active Problem List   Diagnosis   • Essential (primary) hypertension   • Proteinuria   • Migraine without aura and without status migrainosus, not intractable   • Pain in right knee   • Status post total hysterectomy   • Family history of breast cancer   • Vitamin D deficiency   • Vegetarian diet   • Spider veins of both lower extremities   • Class 1 obesity due to excess calories without serious comorbidity with body mass index (BMI) of 33.0 to 33.9 in adult   • Familial hypercholesterolemia   • Primary insomnia   • Swelling, mass, or lump on face         Current Outpatient Medications:   •  nebivolol (BYSTOLIC) 20 MG tablet, Take 1 tablet (20 mg total) by mouth daily Nebivolol only!!, Disp: 90 tablet, Rfl: 3  •  SUMAtriptan-naproxen (TREXIMET)  MG per tablet, TAKE ONE TABLET AT HEADACHE ONSET, MAY REPEAT 1 AFTER 2 HOURS AS NEEDED .  MAXIMUM OF 2/24 HOURS, Disp: 9 tablet, Rfl: 5  •  NIFEdipine ER (ADALAT CC) 30 MG 24 hr tablet, Take 1 tablet (30 mg total) by mouth daily, Disp: 90 tablet, Rfl: 2    Past Surgical History:   Procedure Laterality Date   • BREAST BIOPSY Left     bengin   • ENDOMETRIAL BIOPSY  02/26/2018   • FOOT SURGERY Bilateral     reconstructive   • HYSTERECTOMY  09/04/2018   • TONSILLECTOMY     • TUBAL LIGATION           Social History     Tobacco Use   • Smoking status: Never   • Smokeless tobacco: Never   Substance Use Topics   • Alcohol use: Never       Review of Systems    Per HPI      Objective:  /90 (BP Location: Left arm, Patient Position: Sitting, Cuff Size: Large)   Pulse 77   Temp 97.7 °F (36.5 °C)   Resp 20   Ht 5' 4" (1.626 m)   Wt 90.6 kg (199 lb 12.8 oz)   SpO2 99%   BMI 34.30 kg/m²    Physical Exam  Vitals reviewed. Constitutional:       Appearance: Normal appearance. Skin:     General: Skin is warm and dry. Comments: Approx. 2cm x 1cm oval-shaped deep subcutaneous mass of the left cheek, which is more palpable from the inside of the left cheek. The mass is not well defined, but is soft and mobile. There is no surrounding erythema, drainage, or signs of infection. Mass has slightly increased in size and is now appreciated more upon palpation of the buccal mucosa. Neurological:      General: No focal deficit present. Mental Status: She is alert. Psychiatric:         Mood and Affect: Mood normal.         Thought Content: Thought content normal.         Judgment: Judgment normal.               ULTRASOUND of the left cheek     INDICATION:   R22.0: Localized swelling, mass and lump, head.     COMPARISON:  None     TECHNIQUE:   Real-time ultrasound of the left cheek was performed with a linear transducer with both volumetric sweeps and still imaging techniques.     FINDINGS:  Targeted evaluation of the patient's palpable lump in the left cheek demonstrates a hypoechoic  solid nodule measuring 9 x 6 x 8 mm.   This does not demonstrate imaging features of a fatty lesion or a cystic lesion and does not have the   appearance of a typical lymph node       IMPRESSION:     9 x 6 x 8 mm solid hypoechoic exophytic nodule in the region of the left cheek, indeterminate does not demonstrate imaging features of a simple cyst, lipoma or  typical lymph node this needs further evaluation and characterization, clinical correlation. CLINICAL HISTORY: Left cheek nodule. No known history of trauma or malignancy. The patient reports palpable lump on the face for 3 years, growing in size.     COMPARISON: 3/17/2023 ultrasound. 9/9/2011 brain MR.     TECHNIQUE: MRI of the face utilizing multiple pulse sequences in multiple planes. Images were obtained both before and after administration of IV contrast.     IV contrast: 8 mL Gadobutrol     FINDINGS:  VISUALIZED BRAIN:  Similar small right middle cranial fossa arachnoid cyst, usually clinically insignificant.     No hemorrhage, hydrocephalus, mass effect, midline shift or abnormal enhancement.     ORBITS: Within normal limits.     PARANASAL SINUSES: Well aerated.     SOFT TISSUES:  Marker indicating area of palpable abnormality at the left cheek. No mass, cyst or asymmetry identified. No abnormal enhancement.     Parotid and submandibular glands and visualized aerodigestive tract and thyroid gland are within normal limits.     No enlarged lymph nodes.     BONES: No acute findings. Preserved bone marrow signal intensity.     IMPRESSION:  No mass is identified. Assessment/Plan: 52year old female with left cheek mass        Ultrasound and MRI again reviewed, there is no mass identified on MRI but there is a palpable mass on physical exam. Ultrasound does not identify typical cyst, lipoma or lymph node. Today I had a lengthy discussion with the patient. As the mass has increased in size, the mass is now more palpable from the inside of the left cheek. I do think an intra-oral approach would be more appropriate as it abuts the buccal mucosa.  I advised the patient that I think an oral surgery referral wound be appropriate, and they may be able to excise this mass intra-orally, which would avoid external facial scarring. The patient is agreeable to this recommendation. Oral surgery referral placed. All the patient's questions were answered to her satisfaction, she understands and agrees with the plan of care.       Ching Wang PA-C  Plastic & Reconstructive Surgery

## 2023-09-29 PROCEDURE — 88304 TISSUE EXAM BY PATHOLOGIST: CPT | Performed by: STUDENT IN AN ORGANIZED HEALTH CARE EDUCATION/TRAINING PROGRAM

## 2023-10-02 ENCOUNTER — LAB REQUISITION (OUTPATIENT)
Dept: LAB | Facility: HOSPITAL | Age: 53
End: 2023-10-02
Payer: COMMERCIAL

## 2023-10-02 DIAGNOSIS — D17.9 BENIGN LIPOMATOUS NEOPLASM, UNSPECIFIED: ICD-10-CM

## 2023-10-05 PROCEDURE — 88304 TISSUE EXAM BY PATHOLOGIST: CPT | Performed by: STUDENT IN AN ORGANIZED HEALTH CARE EDUCATION/TRAINING PROGRAM

## 2023-10-10 ENCOUNTER — TELEPHONE (OUTPATIENT)
Dept: NEPHROLOGY | Facility: CLINIC | Age: 53
End: 2023-10-10

## 2023-10-10 NOTE — TELEPHONE ENCOUNTER
Called to advise to get labs prior to 10/17 appt. Was unable to LM the voice mail box was not set up yet.

## 2023-10-12 DIAGNOSIS — I10 ESSENTIAL (PRIMARY) HYPERTENSION: Primary | ICD-10-CM

## 2023-10-14 ENCOUNTER — APPOINTMENT (OUTPATIENT)
Dept: LAB | Facility: HOSPITAL | Age: 53
End: 2023-10-14
Payer: COMMERCIAL

## 2023-10-14 DIAGNOSIS — I10 ESSENTIAL (PRIMARY) HYPERTENSION: ICD-10-CM

## 2023-10-14 DIAGNOSIS — E78.01 FAMILIAL HYPERCHOLESTEROLEMIA: ICD-10-CM

## 2023-10-14 DIAGNOSIS — E55.9 VITAMIN D DEFICIENCY: ICD-10-CM

## 2023-10-14 LAB
25(OH)D3 SERPL-MCNC: 20.3 NG/ML (ref 30–100)
ALBUMIN SERPL BCP-MCNC: 3.9 G/DL (ref 3.5–5)
ALP SERPL-CCNC: 82 U/L (ref 34–104)
ALT SERPL W P-5'-P-CCNC: 12 U/L (ref 7–52)
ANION GAP SERPL CALCULATED.3IONS-SCNC: 5 MMOL/L
AST SERPL W P-5'-P-CCNC: 12 U/L (ref 13–39)
BACTERIA UR QL AUTO: ABNORMAL /HPF
BASOPHILS # BLD AUTO: 0.05 THOUSANDS/ÂΜL (ref 0–0.1)
BASOPHILS NFR BLD AUTO: 1 % (ref 0–1)
BILIRUB SERPL-MCNC: 0.73 MG/DL (ref 0.2–1)
BILIRUB UR QL STRIP: NEGATIVE
BUN SERPL-MCNC: 19 MG/DL (ref 5–25)
CALCIUM SERPL-MCNC: 9.6 MG/DL (ref 8.4–10.2)
CHLORIDE SERPL-SCNC: 104 MMOL/L (ref 96–108)
CHOLEST SERPL-MCNC: 240 MG/DL
CLARITY UR: CLEAR
CO2 SERPL-SCNC: 29 MMOL/L (ref 21–32)
COLOR UR: ABNORMAL
CREAT SERPL-MCNC: 0.74 MG/DL (ref 0.6–1.3)
EOSINOPHIL # BLD AUTO: 0.12 THOUSAND/ÂΜL (ref 0–0.61)
EOSINOPHIL NFR BLD AUTO: 2 % (ref 0–6)
ERYTHROCYTE [DISTWIDTH] IN BLOOD BY AUTOMATED COUNT: 12.3 % (ref 11.6–15.1)
GFR SERPL CREATININE-BSD FRML MDRD: 91 ML/MIN/1.73SQ M
GLUCOSE P FAST SERPL-MCNC: 105 MG/DL (ref 65–99)
GLUCOSE UR STRIP-MCNC: NEGATIVE MG/DL
HCT VFR BLD AUTO: 39.6 % (ref 34.8–46.1)
HDLC SERPL-MCNC: 45 MG/DL
HGB BLD-MCNC: 13.2 G/DL (ref 11.5–15.4)
HGB UR QL STRIP.AUTO: NEGATIVE
IMM GRANULOCYTES # BLD AUTO: 0.01 THOUSAND/UL (ref 0–0.2)
IMM GRANULOCYTES NFR BLD AUTO: 0 % (ref 0–2)
KETONES UR STRIP-MCNC: NEGATIVE MG/DL
LDLC SERPL CALC-MCNC: 158 MG/DL (ref 0–100)
LEUKOCYTE ESTERASE UR QL STRIP: NEGATIVE
LYMPHOCYTES # BLD AUTO: 2.49 THOUSANDS/ÂΜL (ref 0.6–4.47)
LYMPHOCYTES NFR BLD AUTO: 43 % (ref 14–44)
MCH RBC QN AUTO: 29.2 PG (ref 26.8–34.3)
MCHC RBC AUTO-ENTMCNC: 33.3 G/DL (ref 31.4–37.4)
MCV RBC AUTO: 88 FL (ref 82–98)
MONOCYTES # BLD AUTO: 0.51 THOUSAND/ÂΜL (ref 0.17–1.22)
MONOCYTES NFR BLD AUTO: 9 % (ref 4–12)
MUCOUS THREADS UR QL AUTO: ABNORMAL
NEUTROPHILS # BLD AUTO: 2.57 THOUSANDS/ÂΜL (ref 1.85–7.62)
NEUTS SEG NFR BLD AUTO: 45 % (ref 43–75)
NITRITE UR QL STRIP: NEGATIVE
NON-SQ EPI CELLS URNS QL MICRO: ABNORMAL /HPF
NONHDLC SERPL-MCNC: 195 MG/DL
NRBC BLD AUTO-RTO: 0 /100 WBCS
PH UR STRIP.AUTO: 5.5 [PH]
PLATELET # BLD AUTO: 181 THOUSANDS/UL (ref 149–390)
PMV BLD AUTO: 11.8 FL (ref 8.9–12.7)
POTASSIUM SERPL-SCNC: 4.2 MMOL/L (ref 3.5–5.3)
PROT SERPL-MCNC: 7.2 G/DL (ref 6.4–8.4)
PROT UR STRIP-MCNC: NEGATIVE MG/DL
RBC # BLD AUTO: 4.52 MILLION/UL (ref 3.81–5.12)
RBC #/AREA URNS AUTO: ABNORMAL /HPF
SODIUM SERPL-SCNC: 138 MMOL/L (ref 135–147)
SP GR UR STRIP.AUTO: 1.02 (ref 1–1.03)
TRIGL SERPL-MCNC: 184 MG/DL
UROBILINOGEN UR STRIP-ACNC: <2 MG/DL
WBC # BLD AUTO: 5.75 THOUSAND/UL (ref 4.31–10.16)
WBC #/AREA URNS AUTO: ABNORMAL /HPF

## 2023-10-14 PROCEDURE — 80061 LIPID PANEL: CPT

## 2023-10-14 PROCEDURE — 85025 COMPLETE CBC W/AUTO DIFF WBC: CPT

## 2023-10-14 PROCEDURE — 80053 COMPREHEN METABOLIC PANEL: CPT

## 2023-10-14 PROCEDURE — 81001 URINALYSIS AUTO W/SCOPE: CPT

## 2023-10-14 PROCEDURE — 82306 VITAMIN D 25 HYDROXY: CPT

## 2023-10-14 PROCEDURE — 36415 COLL VENOUS BLD VENIPUNCTURE: CPT

## 2023-10-16 ENCOUNTER — TELEPHONE (OUTPATIENT)
Dept: NEPHROLOGY | Facility: CLINIC | Age: 53
End: 2023-10-16

## 2023-10-17 ENCOUNTER — OFFICE VISIT (OUTPATIENT)
Dept: NEPHROLOGY | Facility: CLINIC | Age: 53
End: 2023-10-17

## 2023-10-17 VITALS
DIASTOLIC BLOOD PRESSURE: 90 MMHG | BODY MASS INDEX: 34.83 KG/M2 | SYSTOLIC BLOOD PRESSURE: 160 MMHG | OXYGEN SATURATION: 98 % | TEMPERATURE: 97.1 F | HEIGHT: 64 IN | WEIGHT: 204 LBS | RESPIRATION RATE: 16 BRPM | HEART RATE: 72 BPM

## 2023-10-17 DIAGNOSIS — R80.1 PERSISTENT PROTEINURIA: ICD-10-CM

## 2023-10-17 DIAGNOSIS — E66.09 CLASS 1 OBESITY DUE TO EXCESS CALORIES WITHOUT SERIOUS COMORBIDITY WITH BODY MASS INDEX (BMI) OF 33.0 TO 33.9 IN ADULT: ICD-10-CM

## 2023-10-17 DIAGNOSIS — I10 ESSENTIAL (PRIMARY) HYPERTENSION: Primary | ICD-10-CM

## 2023-10-17 NOTE — PROGRESS NOTES
NEPHROLOGY OFFICE FOLLOW UP  Mirian Paige 52 y.o. female MRN: [id number]    Encounter: [id number] 10/17/2023    REASON FOR VISIT: Mirian Paige is a 52 y.o. female who is here on 10/17/2023 for Follow-up and Hypertension  . HPI:    Sherie Robledo came in today for yearly follow-up of blood pressure. Patient also had a proteinuria in the past    She is doing well overall    Denies any acute complaint    No chest pain no palpitation no shortness of breath    No nausea no vomiting    Denies any urinary complaint        REVIEW OF SYSTEMS:    Review of Systems   Constitutional:  Negative for activity change and fatigue. HENT:  Negative for congestion and ear discharge. Eyes:  Negative for photophobia and pain. Respiratory:  Negative for apnea and choking. Cardiovascular:  Negative for chest pain and palpitations. Gastrointestinal:  Negative for abdominal distention and blood in stool. Endocrine: Negative for heat intolerance and polyphagia. Genitourinary:  Negative for flank pain and urgency. Musculoskeletal:  Negative for neck pain and neck stiffness. Skin:  Negative for color change and wound. Allergic/Immunologic: Negative for food allergies and immunocompromised state. Neurological:  Negative for seizures and facial asymmetry. Hematological:  Negative for adenopathy. Does not bruise/bleed easily. Psychiatric/Behavioral:  Negative for self-injury and suicidal ideas.           PAST MEDICAL HISTORY:  Past Medical History:   Diagnosis Date    Asthma N/a    Fatigue     Hypertension     Migraine     Proteinuria        PAST SURGICAL HISTORY:  Past Surgical History:   Procedure Laterality Date    BREAST BIOPSY Left     bengin    ENDOMETRIAL BIOPSY  02/26/2018    FOOT SURGERY Bilateral     reconstructive    HYSTERECTOMY  09/04/2018    TONSILLECTOMY      TUBAL LIGATION         SOCIAL HISTORY:  Social History     Substance and Sexual Activity   Alcohol Use Never     Social History Substance and Sexual Activity   Drug Use Never     Social History     Tobacco Use   Smoking Status Never   Smokeless Tobacco Never       FAMILY HISTORY:  Family History   Problem Relation Age of Onset    Stroke Mother     Diabetes Mother     Hypertension Mother     Kidney failure Mother     Dialysis Mother     Migraines Mother     No Known Problems Father     Breast cancer Sister         Paternal    No Known Problems Daughter     Lung cancer Maternal Grandmother     Breast cancer Sister         Maternal       MEDICATIONS:    Current Outpatient Medications:     nebivolol (BYSTOLIC) 20 MG tablet, Take 1 tablet (20 mg total) by mouth daily Nebivolol only!!, Disp: 90 tablet, Rfl: 3    SUMAtriptan-naproxen (TREXIMET)  MG per tablet, TAKE ONE TABLET AT HEADACHE ONSET, MAY REPEAT 1 AFTER 2 HOURS AS NEEDED . MAXIMUM OF 2/24 HOURS, Disp: 9 tablet, Rfl: 5    PHYSICAL EXAM:  Vitals:    10/17/23 1536   BP: 160/90   BP Location: Right arm   Patient Position: Sitting   Pulse: 72   Resp: 16   Temp: (!) 97.1 °F (36.2 °C)   TempSrc: Temporal   SpO2: 98%   Weight: 92.5 kg (204 lb)   Height: 5' 4" (1.626 m)     Body mass index is 35.02 kg/m². Physical Exam  Constitutional:       General: She is not in acute distress. Appearance: She is well-developed. She is obese. HENT:      Head: Normocephalic. Mouth/Throat:      Mouth: Mucous membranes are moist.   Eyes:      General: No scleral icterus. Conjunctiva/sclera: Conjunctivae normal.   Neck:      Vascular: No JVD. Cardiovascular:      Rate and Rhythm: Normal rate. Heart sounds: Normal heart sounds. Pulmonary:      Effort: Pulmonary effort is normal.      Breath sounds: No wheezing. Abdominal:      Palpations: Abdomen is soft. Tenderness: There is no abdominal tenderness. Musculoskeletal:         General: Normal range of motion. Cervical back: Neck supple. Skin:     General: Skin is warm. Findings: No rash.    Neurological: Mental Status: She is alert and oriented to person, place, and time.    Psychiatric:         Behavior: Behavior normal.         LAB RESULTS:  Results for orders placed or performed in visit on 10/14/23   CBC and differential   Result Value Ref Range    WBC 5.75 4.31 - 10.16 Thousand/uL    RBC 4.52 3.81 - 5.12 Million/uL    Hemoglobin 13.2 11.5 - 15.4 g/dL    Hematocrit 39.6 34.8 - 46.1 %    MCV 88 82 - 98 fL    MCH 29.2 26.8 - 34.3 pg    MCHC 33.3 31.4 - 37.4 g/dL    RDW 12.3 11.6 - 15.1 %    MPV 11.8 8.9 - 12.7 fL    Platelets 911 628 - 522 Thousands/uL    nRBC 0 /100 WBCs    Neutrophils Relative 45 43 - 75 %    Immat GRANS % 0 0 - 2 %    Lymphocytes Relative 43 14 - 44 %    Monocytes Relative 9 4 - 12 %    Eosinophils Relative 2 0 - 6 %    Basophils Relative 1 0 - 1 %    Neutrophils Absolute 2.57 1.85 - 7.62 Thousands/µL    Immature Grans Absolute 0.01 0.00 - 0.20 Thousand/uL    Lymphocytes Absolute 2.49 0.60 - 4.47 Thousands/µL    Monocytes Absolute 0.51 0.17 - 1.22 Thousand/µL    Eosinophils Absolute 0.12 0.00 - 0.61 Thousand/µL    Basophils Absolute 0.05 0.00 - 0.10 Thousands/µL   Comprehensive metabolic panel   Result Value Ref Range    Sodium 138 135 - 147 mmol/L    Potassium 4.2 3.5 - 5.3 mmol/L    Chloride 104 96 - 108 mmol/L    CO2 29 21 - 32 mmol/L    ANION GAP 5 mmol/L    BUN 19 5 - 25 mg/dL    Creatinine 0.74 0.60 - 1.30 mg/dL    Glucose, Fasting 105 (H) 65 - 99 mg/dL    Calcium 9.6 8.4 - 10.2 mg/dL    AST 12 (L) 13 - 39 U/L    ALT 12 7 - 52 U/L    Alkaline Phosphatase 82 34 - 104 U/L    Total Protein 7.2 6.4 - 8.4 g/dL    Albumin 3.9 3.5 - 5.0 g/dL    Total Bilirubin 0.73 0.20 - 1.00 mg/dL    eGFR 91 ml/min/1.73sq m   Lipid panel   Result Value Ref Range    Cholesterol 240 (H) See Comment mg/dL    Triglycerides 184 (H) See Comment mg/dL    HDL, Direct 45 (L) >=50 mg/dL    LDL Calculated 158 (H) 0 - 100 mg/dL    Non-HDL-Chol (CHOL-HDL) 195 mg/dl   Vitamin D 25 hydroxy   Result Value Ref Range    Vit D, 25-Hydroxy 20.3 (L) 30.0 - 100.0 ng/mL       ASSESSMENT and PLAN:      Essential (primary) hypertension  Blood pressure is very well controlled at home though high in my office. Patient claims she does get tired if she just came from the work. Advised to continue monitor blood pressure at home if remain high she is going to give me a call    Proteinuria  At present does not have proteinuria. We will continue to monitor    Class 1 obesity due to excess calories without serious comorbidity with body mass index (BMI) of 33.0 to 33.9 in adult  Need to reduce weight. She is well aware of it but cannot lose it      Everything discussed with the patient. I will see her back in 1 year. We will get blood and urine test before that visit        Portions of the record may have been created with voice recognition software. Occasional wrong word or "sound a like" substitutions may have occurred due to the inherent limitations of voice recognition software. Read the chart carefully and recognize, using context, where substitutions have occurred. If you have any questions, please contact the dictating provider.

## 2023-10-17 NOTE — ASSESSMENT & PLAN NOTE
Blood pressure is very well controlled at home though high in my office. Patient claims she does get tired if she just came from the work.   Advised to continue monitor blood pressure at home if remain high she is going to give me a call

## 2023-10-27 ENCOUNTER — OFFICE VISIT (OUTPATIENT)
Dept: FAMILY MEDICINE CLINIC | Facility: CLINIC | Age: 53
End: 2023-10-27
Payer: COMMERCIAL

## 2023-10-27 VITALS
BODY MASS INDEX: 34.89 KG/M2 | OXYGEN SATURATION: 99 % | TEMPERATURE: 96.8 F | SYSTOLIC BLOOD PRESSURE: 128 MMHG | HEART RATE: 58 BPM | DIASTOLIC BLOOD PRESSURE: 82 MMHG | HEIGHT: 64 IN | WEIGHT: 204.4 LBS

## 2023-10-27 DIAGNOSIS — M79.622 LEFT UPPER ARM PAIN: ICD-10-CM

## 2023-10-27 DIAGNOSIS — E55.9 VITAMIN D DEFICIENCY: ICD-10-CM

## 2023-10-27 DIAGNOSIS — Z00.00 ANNUAL PHYSICAL EXAM: Primary | ICD-10-CM

## 2023-10-27 DIAGNOSIS — M25.562 CHRONIC PAIN OF LEFT KNEE: ICD-10-CM

## 2023-10-27 DIAGNOSIS — G89.29 CHRONIC PAIN OF LEFT KNEE: ICD-10-CM

## 2023-10-27 DIAGNOSIS — E78.01 FAMILIAL HYPERCHOLESTEROLEMIA: ICD-10-CM

## 2023-10-27 DIAGNOSIS — Z13.29 SCREENING FOR THYROID DISORDER: ICD-10-CM

## 2023-10-27 DIAGNOSIS — Z12.31 ENCOUNTER FOR SCREENING MAMMOGRAM FOR BREAST CANCER: ICD-10-CM

## 2023-10-27 DIAGNOSIS — Z13.1 SCREENING FOR DIABETES MELLITUS: ICD-10-CM

## 2023-10-27 DIAGNOSIS — I10 ESSENTIAL (PRIMARY) HYPERTENSION: ICD-10-CM

## 2023-10-27 PROCEDURE — 99396 PREV VISIT EST AGE 40-64: CPT | Performed by: NURSE PRACTITIONER

## 2023-10-27 PROCEDURE — 99214 OFFICE O/P EST MOD 30 MIN: CPT | Performed by: NURSE PRACTITIONER

## 2023-10-27 NOTE — ASSESSMENT & PLAN NOTE
Patient currently taking a multivitamin with vitamin D, advised to make sure multivitamin has enough vitamin D, at least 1000 units/day. Advised patient take vitamin D with food for better absorption.

## 2023-10-27 NOTE — PROGRESS NOTES
[address]  [address] [place] [address]    NAME: Harrison Diallo  AGE: 52 y.o. SEX: female  : 1970     DATE: 10/27/2023     Assessment and Plan:     Problem List Items Addressed This Visit          Cardiovascular and Mediastinum    Essential (primary) hypertension     Pressure managed in office today. Discussed importance of continuing with heart healthy diet, weight management. To continue on current dose of Bystolic as prescribed. Other    Vitamin D deficiency     Patient currently taking a multivitamin with vitamin D, advised to make sure multivitamin has enough vitamin D, at least 1000 units/day. Advised patient take vitamin D with food for better absorption. Familial hypercholesterolemia     Recent blood work reviewed with patient. As per patient, engages in a vegetarian diet, does not eat fatty foods. Notes family history of elevated lipids. Discussed with patient use of supplements like omega-3/fish oil red yeast rice. Patient states another coworker recommended red yeast rice as well. Discussed engaging in more aerobic activity, to repeat blood work in 6 months. Relevant Orders    Lipid Panel with Direct LDL reflex    Comprehensive metabolic panel     Other Visit Diagnoses       Annual physical exam    -  Primary    Left upper arm pain        Advised gentle massages/ROM exercises, heat therapy, avoid overuse. X-rays as ordered.     Relevant Orders    XR shoulder 2+ vw left    XR humerus left    Chronic pain of left knee        Relevant Orders    XR knee 3 vw left non injury    Encounter for screening mammogram for breast cancer        Relevant Orders    Mammo screening bilateral w 3d & cad    Screening for thyroid disorder        Relevant Orders    TSH, 3rd generation with Free T4 reflex    Screening for diabetes mellitus        Relevant Orders    HEMOGLOBIN A1C W/ EAG ESTIMATION Immunizations and preventive care screenings were discussed with patient today. Appropriate education was printed on patient's after visit summary. Counseling:  Alcohol/drug use: discussed moderation in alcohol intake, the recommendations for healthy alcohol use, and avoidance of illicit drug use. Dental Health: discussed importance of regular tooth brushing, flossing, and dental visits. Injury prevention: discussed safety/seat belts, safety helmets, smoke detectors, carbon dioxide detectors, and smoking near bedding or upholstery. Sexual health: discussed sexually transmitted diseases, partner selection, use of condoms, avoidance of unintended pregnancy, and contraceptive alternatives. Exercise: the importance of regular exercise/physical activity was discussed. Recommend exercise 3-5 times per week for at least 30 minutes. BMI Counseling: Body mass index is 35.09 kg/m². The BMI is above normal. Nutrition recommendations include decreasing portion sizes, encouraging healthy choices of fruits and vegetables, consuming healthier snacks, limiting drinks that contain sugar, moderation in carbohydrate intake, increasing intake of lean protein, reducing intake of saturated and trans fat and reducing intake of cholesterol. Exercise recommendations include exercising 3-5 times per week and strength training exercises. No pharmacotherapy was ordered. Rationale for BMI follow-up plan is due to patient being overweight or obese. Return in about 6 months (around 4/27/2024) for Recheck. Chief Complaint:     Chief Complaint   Patient presents with    Physical Exam      History of Present Illness:     Adult Annual Physical   Patient here for a comprehensive physical exam. The patient reports problems - left upper arm pain . Has been waxing and waning for the past year. Pain is noted to be a "throbbing" pain. Decreased ROM in left arm. Patient is right-hand dominant.   Denies numbness/tingling, inability to use arm. Also notes worsening of left chronic knee pain. Was told years ago that she had arthritis in her left knee and left hip. Did physical therapy, but continues of increasing pain, especially with standing for long peers of time. Diet and Physical Activity  Diet/Nutrition: well balanced diet and vegetarian . Exercise: moderate cardiovascular exercise and 1-2 times a week on average. Depression Screening  PHQ-2/9 Depression Screening           General Health  Sleep: gets 4-6 hours of sleep on average. Hearing: normal - bilateral.  Vision:  wears glasses for nighttime/driving . Dental: regular dental visits. /GYN Health  Patient is: postmenopausal  Last menstrual period: s/p hysterectomy  Contraceptive method:  n/a . Advanced Care Planning  Do you have an advanced directive? no  Do you have a durable medical power of ? no     Review of Systems:     Review of Systems   Constitutional:  Negative for activity change, appetite change, diaphoresis, fatigue and unexpected weight change. HENT:  Negative for ear pain, sore throat and trouble swallowing. Eyes:  Negative for photophobia and visual disturbance. Respiratory:  Negative for cough, chest tightness and shortness of breath. Cardiovascular:  Negative for chest pain and palpitations. Gastrointestinal:  Negative for abdominal pain, blood in stool, constipation, diarrhea, nausea and vomiting. Endocrine: Negative for polydipsia, polyphagia and polyuria. Genitourinary:  Negative for decreased urine volume, difficulty urinating, dysuria, flank pain, frequency, hematuria and urgency. Musculoskeletal:  Positive for arthralgias (left knee) and myalgias (left upper arm). Negative for back pain, joint swelling, neck pain and neck stiffness. Skin:  Negative for color change and rash. Neurological:  Negative for dizziness, syncope, weakness, light-headedness, numbness and headaches. Hematological:  Negative for adenopathy. Does not bruise/bleed easily. Psychiatric/Behavioral:  Negative for confusion and dysphoric mood. The patient is not nervous/anxious.        Past Medical History:     Past Medical History:   Diagnosis Date    Asthma N/a    Fatigue     Hypertension     Migraine     Proteinuria       Past Surgical History:     Past Surgical History:   Procedure Laterality Date    BREAST BIOPSY Left     bengin    ENDOMETRIAL BIOPSY  02/26/2018    FOOT SURGERY Bilateral     reconstructive    HYSTERECTOMY  09/04/2018    TONSILLECTOMY      TUBAL LIGATION        Social History:     Social History     Socioeconomic History    Marital status: /Civil Union     Spouse name: None    Number of children: None    Years of education: None    Highest education level: None   Occupational History    None   Tobacco Use    Smoking status: Never     Passive exposure: Never    Smokeless tobacco: Never   Vaping Use    Vaping Use: Never used   Substance and Sexual Activity    Alcohol use: Never    Drug use: Never    Sexual activity: Yes     Partners: Male   Other Topics Concern    None   Social History Narrative    None     Social Determinants of Health     Financial Resource Strain: Not on file   Food Insecurity: Not on file   Transportation Needs: Not on file   Physical Activity: Not on file   Stress: Not on file   Social Connections: Not on file   Intimate Partner Violence: Not on file   Housing Stability: Not on file      Family History:     Family History   Problem Relation Age of Onset    Stroke Mother     Diabetes Mother     Hypertension Mother     Kidney failure Mother     Dialysis Mother     Migraines Mother     No Known Problems Father     Breast cancer Sister         Paternal    No Known Problems Daughter     Lung cancer Maternal Grandmother     Breast cancer Sister         Maternal      Current Medications:     Current Outpatient Medications   Medication Sig Dispense Refill    nebivolol (BYSTOLIC) 20 MG tablet Take 1 tablet (20 mg total) by mouth daily Nebivolol only!! 90 tablet 3    SUMAtriptan-naproxen (TREXIMET)  MG per tablet TAKE ONE TABLET AT HEADACHE ONSET, MAY REPEAT 1 AFTER 2 HOURS AS NEEDED . MAXIMUM OF 2/24 HOURS 9 tablet 5     No current facility-administered medications for this visit. Allergies: Allergies   Allergen Reactions    Amoxicillin Hives and Shortness Of Breath     Other reaction(s): Unknown Reaction    Ciprofloxacin Hives and Shortness Of Breath     Other reaction(s): Unknown Reaction    Hydrocodone-Acetaminophen Hives and Shortness Of Breath    Influenza Vaccine Live Hives    Lisinopril Anaphylaxis and Angioedema     Other reaction(s): Unknown Reaction  Other reaction(s): Angioedema    Nitrofurantoin Hives and Shortness Of Breath     Other reaction(s): Unknown Reaction    Sulfa Antibiotics Hives, Shortness Of Breath and Anaphylaxis    Sulfamethoxazole-Trimethoprim Anaphylaxis     Other reaction(s): Unknown Reaction    Influenza Vaccines Other (See Comments)     hives    Oxycodone     Quinolones      Other reaction(s): Unknown Reaction  Other reaction(s): Unknown Reaction  Other reaction(s): Unknown Reaction    Amlodipine Rash     Other reaction(s): Unknown Reaction    Other Hives, Rash and Fever     estrogen patch      Physical Exam:     /82 (BP Location: Left arm, Patient Position: Sitting, Cuff Size: Standard)   Pulse 58   Temp (!) 96.8 °F (36 °C) (Tympanic)   Ht 5' 4" (1.626 m)   Wt 92.7 kg (204 lb 6.4 oz)   SpO2 99%   BMI 35.09 kg/m²     Physical Exam  Vitals reviewed. Constitutional:       General: She is not in acute distress. Appearance: She is well-developed. She is not ill-appearing. HENT:      Head: Normocephalic and atraumatic.       Right Ear: Tympanic membrane, ear canal and external ear normal.      Left Ear: Tympanic membrane, ear canal and external ear normal.      Nose: Nose normal.      Mouth/Throat:      Mouth: Mucous membranes are moist.      Pharynx: Oropharynx is clear. Eyes:      Extraocular Movements: Extraocular movements intact. Conjunctiva/sclera: Conjunctivae normal.      Pupils: Pupils are equal, round, and reactive to light. Neck:      Vascular: No carotid bruit. Cardiovascular:      Rate and Rhythm: Regular rhythm. Bradycardia present. Pulses: Normal pulses. Heart sounds: Normal heart sounds. No murmur heard. Pulmonary:      Effort: Pulmonary effort is normal. No respiratory distress. Breath sounds: Normal breath sounds. Abdominal:      General: Bowel sounds are normal.      Palpations: Abdomen is soft. Tenderness: There is no abdominal tenderness. There is no right CVA tenderness or left CVA tenderness. Musculoskeletal:         General: No swelling. Normal range of motion. Right shoulder: Normal.      Left shoulder: No swelling, tenderness, bony tenderness or crepitus. Normal strength. Normal pulse. Left upper arm: No swelling, deformity, tenderness or bony tenderness. Cervical back: Normal range of motion and neck supple. Right lower leg: No edema. Left lower leg: No edema. Lymphadenopathy:      Cervical: No cervical adenopathy. Skin:     General: Skin is warm and dry. Capillary Refill: Capillary refill takes less than 2 seconds. Neurological:      General: No focal deficit present. Mental Status: She is alert and oriented to person, place, and time.    Psychiatric:         Mood and Affect: Mood normal.         Behavior: Behavior normal.          Debbie Guadarrama, [address] [facility]

## 2023-10-27 NOTE — PATIENT INSTRUCTIONS
Wellness Visit for Adults   AMBULATORY CARE:   A wellness visit  is when you see your healthcare provider to get screened for health problems. Your healthcare provider will also give you advice on how to stay healthy. Write down your questions so you remember to ask them. Ask your healthcare provider how often you should have a wellness visit. What happens at a wellness visit:  Your healthcare provider will ask about your health, and your family history of health problems. This includes high blood pressure, heart disease, and cancer. He or she will ask if you have symptoms that concern you, if you smoke, and about your mood. You may also be asked about your intake of medicines, supplements, food, and alcohol. Any of the following may be done: Your weight  will be checked. Your height may also be checked so your body mass index (BMI) can be calculated. Your BMI shows if you are at a healthy weight. Your blood pressure  and heart rate will be checked. Your temperature may also be checked. Blood and urine tests  may be done. Blood tests may be done to check your cholesterol levels. Abnormal cholesterol levels increase your risk for heart disease and stroke. You may also need a blood or urine test to check for diabetes if you are at increased risk. Urine tests may be done to look for signs of an infection or kidney disease. A physical exam  includes checking your heartbeat and lungs with a stethoscope. Your healthcare provider may also check your skin to look for sun damage. Screening tests  may be recommended. A screening test is done to check for diseases that may not cause symptoms. The screening tests you may need depend on your age, gender, family history, and lifestyle habits. For example, colorectal screening may be recommended if you are 48years old or older. Screening tests you need if you are a woman:   A Pap smear  is used to screen for cervical cancer.  Pap smears are usually done every 3 to 5 years depending on your age. You may need them more often if you have had abnormal Pap smear test results in the past. Ask your healthcare provider how often you should have a Pap smear. A mammogram  is an x-ray of your breasts to screen for breast cancer. Experts recommend mammograms every 2 years starting at age 48 years. You may need a mammogram at age 52 years or younger if you have an increased risk for breast cancer. Talk to your healthcare provider about when you should start having mammograms and how often you need them. Vaccines you may need:   Get an influenza vaccine  every year. The influenza vaccine protects you from the flu. Several types of viruses cause the flu. The viruses change over time, so new vaccines are made each year. Get a tetanus-diphtheria (Td) booster vaccine  every 10 years. This vaccine protects you against tetanus and diphtheria. Tetanus is a severe infection that may cause painful muscle spasms and lockjaw. Diphtheria is a severe bacterial infection that causes a thick covering in the back of your mouth and throat. Get a human papillomavirus (HPV) vaccine  if you are female and aged 23 to 32 or male 23 to 24 and never received it. This vaccine protects you from HPV infection. HPV is the most common infection spread by sexual contact. HPV may also cause vaginal, penile, and anal cancers. Get a pneumococcal vaccine  if you are aged 72 years or older. The pneumococcal vaccine is an injection given to protect you from pneumococcal disease. Pneumococcal disease is an infection caused by pneumococcal bacteria. The infection may cause pneumonia, meningitis, or an ear infection. Get a shingles vaccine  if you are 60 or older, even if you have had shingles before. The shingles vaccine is an injection to protect you from the varicella-zoster virus. This is the same virus that causes chickenpox.  Shingles is a painful rash that develops in people who had chickenpox or have been exposed to the virus. How to eat healthy:  My Plate is a model for planning healthy meals. It shows the types and amounts of foods that should go on your plate. Fruits and vegetables make up about half of your plate, and grains and protein make up the other half. A serving of dairy is included on the side of your plate. The amount of calories and serving sizes you need depends on your age, gender, weight, and height. Examples of healthy foods are listed below:  Eat a variety of vegetables  such as dark green, red, and orange vegetables. You can also include canned vegetables low in sodium (salt) and frozen vegetables without added butter or sauces. Eat a variety of fresh fruits , canned fruit in 100% juice, frozen fruit, and dried fruit. Include whole grains. At least half of the grains you eat should be whole grains. Examples include whole-wheat bread, wheat pasta, brown rice, and whole-grain cereals such as oatmeal.    Eat a variety of protein foods such as seafood (fish and shellfish), lean meat, and poultry without skin (turkey and chicken). Examples of lean meats include pork leg, shoulder, or tenderloin, and beef round, sirloin, tenderloin, and extra lean ground beef. Other protein foods include eggs and egg substitutes, beans, peas, soy products, nuts, and seeds. Choose low-fat dairy products such as skim or 1% milk or low-fat yogurt, cheese, and cottage cheese. Limit unhealthy fats  such as butter, hard margarine, and shortening. Exercise:  Exercise at least 30 minutes per day on most days of the week. Some examples of exercise include walking, biking, dancing, and swimming. You can also fit in more physical activity by taking the stairs instead of the elevator or parking farther away from stores. Include muscle strengthening activities 2 days each week. Regular exercise provides many health benefits.  It helps you manage your weight, and decreases your risk for type 2 diabetes, heart disease, stroke, and high blood pressure. Exercise can also help improve your mood. Ask your healthcare provider about the best exercise plan for you. General health and safety guidelines:   Do not smoke. Nicotine and other chemicals in cigarettes and cigars can cause lung damage. Ask your healthcare provider for information if you currently smoke and need help to quit. E-cigarettes or smokeless tobacco still contain nicotine. Talk to your healthcare provider before you use these products. Limit alcohol. A drink of alcohol is 12 ounces of beer, 5 ounces of wine, or 1½ ounces of liquor. Lose weight, if needed. Being overweight increases your risk of certain health conditions. These include heart disease, high blood pressure, type 2 diabetes, and certain types of cancer. Protect your skin. Do not sunbathe or use tanning beds. Use sunscreen with a SPF 15 or higher. Apply sunscreen at least 15 minutes before you go outside. Reapply sunscreen every 2 hours. Wear protective clothing, hats, and sunglasses when you are outside. Drive safely. Always wear your seatbelt. Make sure everyone in your car wears a seatbelt. A seatbelt can save your life if you are in an accident. Do not use your cell phone when you are driving. This could distract you and cause an accident. Pull over if you need to make a call or send a text message. Practice safe sex. Use latex condoms if are sexually active and have more than one partner. Your healthcare provider may recommend screening tests for sexually transmitted infections (STIs). Wear helmets, lifejackets, and protective gear. Always wear a helmet when you ride a bike or motorcycle, go skiing, or play sports that could cause a head injury. Wear protective equipment when you play sports. Wear a lifejacket when you are on a boat or doing water sports.     © Copyright [employer] 2023 Information is for End User's use only and may not be sold, redistributed or otherwise used for commercial purposes. The above information is an  only. It is not intended as medical advice for individual conditions or treatments. Talk to your doctor, nurse or pharmacist before following any medical regimen to see if it is safe and effective for you.

## 2023-10-27 NOTE — ASSESSMENT & PLAN NOTE
Recent blood work reviewed with patient. As per patient, engages in a vegetarian diet, does not eat fatty foods. Notes family history of elevated lipids. Discussed with patient use of supplements like omega-3/fish oil red yeast rice. Patient states another coworker recommended red yeast rice as well. Discussed engaging in more aerobic activity, to repeat blood work in 6 months.

## 2023-10-27 NOTE — ASSESSMENT & PLAN NOTE
Pressure managed in office today. Discussed importance of continuing with heart healthy diet, weight management. To continue on current dose of Bystolic as prescribed.

## 2023-12-07 DIAGNOSIS — I10 ESSENTIAL (PRIMARY) HYPERTENSION: ICD-10-CM

## 2023-12-07 RX ORDER — NEBIVOLOL 20 MG/1
20 TABLET ORAL DAILY
Qty: 90 TABLET | Refills: 0 | Status: SHIPPED | OUTPATIENT
Start: 2023-12-07

## 2023-12-15 ENCOUNTER — HOSPITAL ENCOUNTER (OUTPATIENT)
Dept: RADIOLOGY | Facility: HOSPITAL | Age: 53
End: 2023-12-15
Payer: COMMERCIAL

## 2023-12-15 DIAGNOSIS — G89.29 CHRONIC PAIN OF LEFT KNEE: ICD-10-CM

## 2023-12-15 DIAGNOSIS — M79.622 LEFT UPPER ARM PAIN: ICD-10-CM

## 2023-12-15 DIAGNOSIS — M17.12 OSTEOARTHRITIS OF LEFT KNEE, UNSPECIFIED OSTEOARTHRITIS TYPE: Primary | ICD-10-CM

## 2023-12-15 DIAGNOSIS — M25.562 CHRONIC PAIN OF LEFT KNEE: ICD-10-CM

## 2023-12-15 PROCEDURE — 73562 X-RAY EXAM OF KNEE 3: CPT

## 2023-12-15 PROCEDURE — 73060 X-RAY EXAM OF HUMERUS: CPT

## 2023-12-15 PROCEDURE — 73030 X-RAY EXAM OF SHOULDER: CPT

## 2023-12-15 RX ORDER — MELOXICAM 15 MG/1
15 TABLET ORAL DAILY PRN
Qty: 90 TABLET | Refills: 0 | Status: SHIPPED | OUTPATIENT
Start: 2023-12-15

## 2024-01-26 ENCOUNTER — HOSPITAL ENCOUNTER (OUTPATIENT)
Age: 54
Discharge: HOME | End: 2024-01-26
Payer: COMMERCIAL

## 2024-01-26 VITALS — BODY MASS INDEX: 34.83 KG/M2 | HEIGHT: 64 IN | WEIGHT: 204 LBS

## 2024-01-26 DIAGNOSIS — Z12.31 ENCOUNTER FOR SCREENING MAMMOGRAM FOR BREAST CANCER: ICD-10-CM

## 2024-01-26 PROCEDURE — 77067 SCR MAMMO BI INCL CAD: CPT

## 2024-01-26 PROCEDURE — 77063 BREAST TOMOSYNTHESIS BI: CPT

## 2024-02-02 ENCOUNTER — TELEPHONE (OUTPATIENT)
Age: 54
End: 2024-02-02

## 2024-05-04 DIAGNOSIS — G43.009 MIGRAINE WITHOUT AURA AND WITHOUT STATUS MIGRAINOSUS, NOT INTRACTABLE: ICD-10-CM

## 2024-05-07 RX ORDER — SUMATRIPTAN AND NAPROXEN SODIUM 85; 500 MG/1; MG/1
TABLET, FILM COATED ORAL
Qty: 9 TABLET | Refills: 5 | Status: SHIPPED | OUTPATIENT
Start: 2024-05-07

## 2024-09-06 DIAGNOSIS — I10 ESSENTIAL (PRIMARY) HYPERTENSION: ICD-10-CM

## 2024-09-06 RX ORDER — NEBIVOLOL 20 MG/1
20 TABLET ORAL DAILY
Qty: 90 TABLET | Refills: 1 | Status: SHIPPED | OUTPATIENT
Start: 2024-09-06

## 2024-09-07 ENCOUNTER — APPOINTMENT (OUTPATIENT)
Dept: LAB | Facility: HOSPITAL | Age: 54
End: 2024-09-07
Payer: COMMERCIAL

## 2024-09-07 DIAGNOSIS — I10 ESSENTIAL (PRIMARY) HYPERTENSION: ICD-10-CM

## 2024-09-07 DIAGNOSIS — Z13.1 SCREENING FOR DIABETES MELLITUS: ICD-10-CM

## 2024-09-07 DIAGNOSIS — R80.1 PERSISTENT PROTEINURIA: ICD-10-CM

## 2024-09-07 DIAGNOSIS — Z13.29 SCREENING FOR THYROID DISORDER: ICD-10-CM

## 2024-09-07 DIAGNOSIS — E78.01 FAMILIAL HYPERCHOLESTEROLEMIA: ICD-10-CM

## 2024-09-07 LAB
ALBUMIN SERPL BCG-MCNC: 3.7 G/DL (ref 3.5–5)
ALP SERPL-CCNC: 77 U/L (ref 34–104)
ALT SERPL W P-5'-P-CCNC: 14 U/L (ref 7–52)
ANION GAP SERPL CALCULATED.3IONS-SCNC: 5 MMOL/L (ref 4–13)
AST SERPL W P-5'-P-CCNC: 14 U/L (ref 13–39)
BASOPHILS # BLD AUTO: 0.03 THOUSANDS/ÂΜL (ref 0–0.1)
BASOPHILS NFR BLD AUTO: 1 % (ref 0–1)
BILIRUB SERPL-MCNC: 0.68 MG/DL (ref 0.2–1)
BILIRUB UR QL STRIP: NEGATIVE
BUN SERPL-MCNC: 16 MG/DL (ref 5–25)
CALCIUM SERPL-MCNC: 9.6 MG/DL (ref 8.4–10.2)
CHLORIDE SERPL-SCNC: 103 MMOL/L (ref 96–108)
CHOLEST SERPL-MCNC: 217 MG/DL
CLARITY UR: CLEAR
CO2 SERPL-SCNC: 27 MMOL/L (ref 21–32)
COLOR UR: COLORLESS
CREAT SERPL-MCNC: 0.65 MG/DL (ref 0.6–1.3)
CREAT UR-MCNC: 32.6 MG/DL
EOSINOPHIL # BLD AUTO: 0.16 THOUSAND/ÂΜL (ref 0–0.61)
EOSINOPHIL NFR BLD AUTO: 3 % (ref 0–6)
ERYTHROCYTE [DISTWIDTH] IN BLOOD BY AUTOMATED COUNT: 12 % (ref 11.6–15.1)
EST. AVERAGE GLUCOSE BLD GHB EST-MCNC: 108 MG/DL
GFR SERPL CREATININE-BSD FRML MDRD: 99 ML/MIN/1.73SQ M
GLUCOSE P FAST SERPL-MCNC: 103 MG/DL (ref 65–99)
GLUCOSE UR STRIP-MCNC: NEGATIVE MG/DL
HBA1C MFR BLD: 5.4 %
HCT VFR BLD AUTO: 38.2 % (ref 34.8–46.1)
HDLC SERPL-MCNC: 39 MG/DL
HGB BLD-MCNC: 13.1 G/DL (ref 11.5–15.4)
HGB UR QL STRIP.AUTO: NEGATIVE
IMM GRANULOCYTES # BLD AUTO: 0.01 THOUSAND/UL (ref 0–0.2)
IMM GRANULOCYTES NFR BLD AUTO: 0 % (ref 0–2)
KETONES UR STRIP-MCNC: NEGATIVE MG/DL
LDLC SERPL CALC-MCNC: 136 MG/DL (ref 0–100)
LEUKOCYTE ESTERASE UR QL STRIP: NEGATIVE
LYMPHOCYTES # BLD AUTO: 2.23 THOUSANDS/ÂΜL (ref 0.6–4.47)
LYMPHOCYTES NFR BLD AUTO: 40 % (ref 14–44)
MCH RBC QN AUTO: 30.3 PG (ref 26.8–34.3)
MCHC RBC AUTO-ENTMCNC: 34.3 G/DL (ref 31.4–37.4)
MCV RBC AUTO: 88 FL (ref 82–98)
MONOCYTES # BLD AUTO: 0.56 THOUSAND/ÂΜL (ref 0.17–1.22)
MONOCYTES NFR BLD AUTO: 10 % (ref 4–12)
NEUTROPHILS # BLD AUTO: 2.58 THOUSANDS/ÂΜL (ref 1.85–7.62)
NEUTS SEG NFR BLD AUTO: 46 % (ref 43–75)
NITRITE UR QL STRIP: NEGATIVE
NRBC BLD AUTO-RTO: 0 /100 WBCS
PH UR STRIP.AUTO: 6 [PH]
PLATELET # BLD AUTO: 153 THOUSANDS/UL (ref 149–390)
PMV BLD AUTO: 11.7 FL (ref 8.9–12.7)
POTASSIUM SERPL-SCNC: 4.3 MMOL/L (ref 3.5–5.3)
PROT SERPL-MCNC: 6.7 G/DL (ref 6.4–8.4)
PROT UR STRIP-MCNC: NEGATIVE MG/DL
PROT UR-MCNC: 5.6 MG/DL
PROT/CREAT UR: 0.2 MG/G{CREAT} (ref 0–0.1)
RBC # BLD AUTO: 4.32 MILLION/UL (ref 3.81–5.12)
SODIUM SERPL-SCNC: 135 MMOL/L (ref 135–147)
SP GR UR STRIP.AUTO: 1.01 (ref 1–1.03)
TRIGL SERPL-MCNC: 212 MG/DL
TSH SERPL DL<=0.05 MIU/L-ACNC: 2.75 UIU/ML (ref 0.45–4.5)
UROBILINOGEN UR STRIP-ACNC: <2 MG/DL
WBC # BLD AUTO: 5.57 THOUSAND/UL (ref 4.31–10.16)

## 2024-09-07 PROCEDURE — 81003 URINALYSIS AUTO W/O SCOPE: CPT

## 2024-09-07 PROCEDURE — 80053 COMPREHEN METABOLIC PANEL: CPT

## 2024-09-07 PROCEDURE — 84156 ASSAY OF PROTEIN URINE: CPT

## 2024-09-07 PROCEDURE — 82570 ASSAY OF URINE CREATININE: CPT

## 2024-09-07 PROCEDURE — 84443 ASSAY THYROID STIM HORMONE: CPT

## 2024-09-07 PROCEDURE — 85025 COMPLETE CBC W/AUTO DIFF WBC: CPT

## 2024-09-07 PROCEDURE — 80061 LIPID PANEL: CPT

## 2024-09-07 PROCEDURE — 83036 HEMOGLOBIN GLYCOSYLATED A1C: CPT

## 2024-09-07 PROCEDURE — 36415 COLL VENOUS BLD VENIPUNCTURE: CPT

## 2024-09-17 ENCOUNTER — OFFICE VISIT (OUTPATIENT)
Dept: FAMILY MEDICINE CLINIC | Facility: CLINIC | Age: 54
End: 2024-09-17
Payer: COMMERCIAL

## 2024-09-17 VITALS
HEART RATE: 68 BPM | RESPIRATION RATE: 16 BRPM | DIASTOLIC BLOOD PRESSURE: 96 MMHG | WEIGHT: 212.4 LBS | TEMPERATURE: 97.4 F | SYSTOLIC BLOOD PRESSURE: 142 MMHG | OXYGEN SATURATION: 98 % | HEIGHT: 64 IN | BODY MASS INDEX: 36.26 KG/M2

## 2024-09-17 DIAGNOSIS — M54.42 ACUTE LEFT-SIDED LOW BACK PAIN WITH LEFT-SIDED SCIATICA: Primary | ICD-10-CM

## 2024-09-17 DIAGNOSIS — M25.552 LEFT HIP PAIN: ICD-10-CM

## 2024-09-17 DIAGNOSIS — R20.2 PARESTHESIA OF LEFT LEG: ICD-10-CM

## 2024-09-17 DIAGNOSIS — Z12.11 SCREENING FOR COLON CANCER: ICD-10-CM

## 2024-09-17 DIAGNOSIS — I83.812 VARICOSE VEINS OF LEFT LOWER EXTREMITY WITH PAIN: ICD-10-CM

## 2024-09-17 DIAGNOSIS — I10 ESSENTIAL (PRIMARY) HYPERTENSION: ICD-10-CM

## 2024-09-17 PROCEDURE — 99214 OFFICE O/P EST MOD 30 MIN: CPT | Performed by: NURSE PRACTITIONER

## 2024-09-17 NOTE — ASSESSMENT & PLAN NOTE
Discussed with patient importance of stress management, self-care.  Discussed possibly taking 1.5 tablets of Bystolic to make 30 mg.  But advised patient to call nephrology for further suggestions/management.  Encouraged to monitor sodium intake, increase hydration.  To continue to monitor blood pressures at home.  To follow-up with nephrology as scheduled, or sooner if needed.

## 2024-09-17 NOTE — PROGRESS NOTES
Ambulatory Visit  Name: Shelia Valdez      : 1970      MRN: [id number]  Encounter Provider: GLORIA Powell  Encounter Date: 2024   Encounter department: [facility] [address]    Assessment & Plan  Acute left-sided low back pain with left-sided sciatica  Advised heat therapy, OTC lidocaine patches or Salonpas, range of motion stretches/exercises.  With paresthesia of left leg, will obtain MRI.    Orders:    MRI thoracic spine w wo contrast; Future    Paresthesia of left leg  To obtain MRI as ordered    Orders:    MRI thoracic spine w wo contrast; Future    Left hip pain  Advised heat therapy, OTC Bio Freeze, ROM stretches/exercises, x-ray as odered.    Orders:    XR hip/pelv 4+ vw left if performed; Future    Varicose veins of left lower extremity with pain  Advised balancing ambulation with elevation of legs, compression stockings, weight management.  To follow-up with vascular    Orders:    Ambulatory Referral to Vascular Surgery; Future    Screening for colon cancer    Orders:    Ambulatory Referral to Gastroenterology; Future    Essential (primary) hypertension  Discussed with patient importance of stress management, self-care.  Discussed possibly taking 1.5 tablets of Bystolic to make 30 mg.  But advised patient to call nephrology for further suggestions/management.  Encouraged to monitor sodium intake, increase hydration.  To continue to monitor blood pressures at home.  To follow-up with nephrology as scheduled, or sooner if needed.            History of Present Illness     Patient presents office for 6-month follow-up.  Recent blood work reviewed with patient.  Has been compliant with daily medications.  Notes blood pressure has been increasing over the past few weeks.  States her job has been stressful, is attempting to find a new job.  Is currently being managed by nephrology for her blood pressure, on Bystolic 20 mg.  Patient states blood  pressures at home have been 150s over 90s.  Denies unilateral weakness, slurred speech, headaches, dizziness, chest pain, palpitations.    Also notes low back pain, left hip pain.  Occasional paresthesia in left leg.  Pain is constant.  Does not change whether she is sitting or standing.  Denies incontinence of bowel or bladder, saddle paresthesia.        History obtained from : patient  Review of Systems   Constitutional:  Negative for activity change and appetite change.   HENT:  Negative for tinnitus.    Eyes:  Negative for photophobia and visual disturbance.   Respiratory:  Negative for cough, chest tightness and shortness of breath.    Cardiovascular:  Negative for chest pain and palpitations.   Gastrointestinal:  Negative for abdominal pain, nausea and vomiting.   Endocrine: Negative for polydipsia, polyphagia and polyuria.   Genitourinary:  Negative for decreased urine volume and hematuria.   Musculoskeletal:  Positive for back pain. Negative for arthralgias and myalgias.   Neurological:  Positive for numbness (left leg). Negative for dizziness, facial asymmetry, weakness and headaches.   Hematological:  Negative for adenopathy. Does not bruise/bleed easily.   Psychiatric/Behavioral:  Negative for confusion and dysphoric mood. The patient is not nervous/anxious.      Pertinent Medical History         Medical History Reviewed by provider this encounter:  Tobacco  Allergies  Meds  Med Hx  Surg Hx  Fam Hx  Soc Hx      Past Medical History   Past Medical History:   Diagnosis Date    Asthma N/a    Fatigue     Hypertension     Migraine     Proteinuria      Past Surgical History:   Procedure Laterality Date    BREAST BIOPSY Left     bengin    ENDOMETRIAL BIOPSY  02/26/2018    FOOT SURGERY Bilateral     reconstructive    HYSTERECTOMY  09/04/2018    TONSILLECTOMY      TUBAL LIGATION       Family History   Problem Relation Age of Onset    Stroke Mother     Diabetes Mother     Hypertension Mother     Kidney  failure Mother     Dialysis Mother     Migraines Mother     Lung cancer Father     Breast cancer Sister 39        Maternal    No Known Problems Daughter     Lung cancer Maternal Grandmother     Breast cancer Half-Sister      Current Outpatient Medications on File Prior to Visit   Medication Sig Dispense Refill    Diclofenac Sodium (VOLTAREN) 1 % Apply 2 g topically 4 (four) times a day 350 g 0    nebivolol (BYSTOLIC) 20 MG tablet TAKE ONE TABLET BY MOUTH DAILY 90 tablet 1    SUMAtriptan-naproxen (TREXIMET)  MG per tablet TAKE 1 TAB BY MOUTH AT HEADACHE ONSET, MAY REPEAT 1 AFTER 2 HOURS AS NEEDED 9 tablet 5    [DISCONTINUED] meloxicam (MOBIC) 15 mg tablet Take 1 tablet (15 mg total) by mouth daily as needed for moderate pain 90 tablet 0     No current facility-administered medications on file prior to visit.     Allergies   Allergen Reactions    Amoxicillin Hives and Shortness Of Breath     Other reaction(s): Unknown Reaction    Ciprofloxacin Hives and Shortness Of Breath     Other reaction(s): Unknown Reaction    Hydrocodone-Acetaminophen Hives and Shortness Of Breath    Influenza Vaccine Live Hives    Lisinopril Anaphylaxis and Angioedema     Other reaction(s): Unknown Reaction  Other reaction(s): Angioedema    Nitrofurantoin Hives and Shortness Of Breath     Other reaction(s): Unknown Reaction    Sulfa Antibiotics Hives, Shortness Of Breath and Anaphylaxis    Sulfamethoxazole-Trimethoprim Anaphylaxis     Other reaction(s): Unknown Reaction    Influenza Vaccines Other (See Comments)     hives    Oxycodone     Quinolones      Other reaction(s): Unknown Reaction  Other reaction(s): Unknown Reaction  Other reaction(s): Unknown Reaction    Amlodipine Rash     Other reaction(s): Unknown Reaction    Other Hives, Rash and Fever     estrogen patch      Current Outpatient Medications on File Prior to Visit   Medication Sig Dispense Refill    Diclofenac Sodium (VOLTAREN) 1 % Apply 2 g topically 4 (four) times a day 350  "g 0    nebivolol (BYSTOLIC) 20 MG tablet TAKE ONE TABLET BY MOUTH DAILY 90 tablet 1    SUMAtriptan-naproxen (TREXIMET)  MG per tablet TAKE 1 TAB BY MOUTH AT HEADACHE ONSET, MAY REPEAT 1 AFTER 2 HOURS AS NEEDED 9 tablet 5    [DISCONTINUED] meloxicam (MOBIC) 15 mg tablet Take 1 tablet (15 mg total) by mouth daily as needed for moderate pain 90 tablet 0     No current facility-administered medications on file prior to visit.      Social History     Tobacco Use    Smoking status: Never     Passive exposure: Never    Smokeless tobacco: Never   Vaping Use    Vaping status: Never Used   Substance and Sexual Activity    Alcohol use: Never    Drug use: Never    Sexual activity: Yes     Partners: Male         Objective     /96 (BP Location: Left arm, Patient Position: Sitting)   Pulse 68   Temp (!) 97.4 °F (36.3 °C)   Resp 16   Ht 5' 4\" (1.626 m)   Wt 96.3 kg (212 lb 6.4 oz)   SpO2 98%   BMI 36.46 kg/m²     Physical Exam  Vitals reviewed.   Constitutional:       General: She is not in acute distress.     Appearance: Normal appearance. She is not ill-appearing.   HENT:      Head: Normocephalic and atraumatic.      Right Ear: Tympanic membrane, ear canal and external ear normal.      Left Ear: Tympanic membrane, ear canal and external ear normal.      Mouth/Throat:      Mouth: Mucous membranes are moist.      Pharynx: Oropharynx is clear.   Eyes:      Conjunctiva/sclera: Conjunctivae normal.      Pupils: Pupils are equal, round, and reactive to light.   Neck:      Vascular: No carotid bruit.   Cardiovascular:      Rate and Rhythm: Normal rate and regular rhythm.      Pulses: Normal pulses.      Heart sounds: Normal heart sounds. No murmur heard.  Pulmonary:      Effort: Pulmonary effort is normal.      Breath sounds: Normal breath sounds.   Abdominal:      General: Bowel sounds are normal.      Palpations: Abdomen is soft.      Tenderness: There is no abdominal tenderness.   Musculoskeletal:      Cervical " back: Normal range of motion and neck supple. No bony tenderness.      Thoracic back: No bony tenderness.      Lumbar back: Tenderness (left lower paraspinal muscle) present.      Right lower leg: No edema.      Left lower leg: No edema.   Lymphadenopathy:      Cervical: No cervical adenopathy.   Skin:     General: Skin is warm and dry.   Neurological:      General: No focal deficit present.      Mental Status: She is alert and oriented to person, place, and time.   Psychiatric:         Mood and Affect: Mood normal.         Behavior: Behavior normal.

## 2024-10-05 ENCOUNTER — HOSPITAL ENCOUNTER (OUTPATIENT)
Dept: RADIOLOGY | Facility: HOSPITAL | Age: 54
Discharge: HOME | End: 2024-10-05
Payer: COMMERCIAL

## 2024-10-05 DIAGNOSIS — M25.552 LEFT HIP PAIN: ICD-10-CM

## 2024-10-05 PROCEDURE — 73502 X-RAY EXAM HIP UNI 2-3 VIEWS: CPT

## 2024-10-09 DIAGNOSIS — M16.12 OSTEOARTHRITIS OF LEFT HIP, UNSPECIFIED OSTEOARTHRITIS TYPE: Primary | ICD-10-CM

## 2024-10-21 ENCOUNTER — OFFICE VISIT (OUTPATIENT)
Dept: NEPHROLOGY | Facility: CLINIC | Age: 54
End: 2024-10-21
Payer: COMMERCIAL

## 2024-10-21 ENCOUNTER — TELEPHONE (OUTPATIENT)
Dept: FAMILY MEDICINE CLINIC | Facility: CLINIC | Age: 54
End: 2024-10-21

## 2024-10-21 VITALS
RESPIRATION RATE: 18 BRPM | DIASTOLIC BLOOD PRESSURE: 70 MMHG | HEART RATE: 61 BPM | SYSTOLIC BLOOD PRESSURE: 130 MMHG | BODY MASS INDEX: 36.43 KG/M2 | OXYGEN SATURATION: 98 % | WEIGHT: 213.4 LBS | HEIGHT: 64 IN | TEMPERATURE: 98.1 F

## 2024-10-21 DIAGNOSIS — E66.09 CLASS 1 OBESITY DUE TO EXCESS CALORIES WITHOUT SERIOUS COMORBIDITY WITH BODY MASS INDEX (BMI) OF 33.0 TO 33.9 IN ADULT: ICD-10-CM

## 2024-10-21 DIAGNOSIS — E66.811 CLASS 1 OBESITY DUE TO EXCESS CALORIES WITHOUT SERIOUS COMORBIDITY WITH BODY MASS INDEX (BMI) OF 33.0 TO 33.9 IN ADULT: ICD-10-CM

## 2024-10-21 DIAGNOSIS — R80.1 PERSISTENT PROTEINURIA: ICD-10-CM

## 2024-10-21 DIAGNOSIS — I10 ESSENTIAL (PRIMARY) HYPERTENSION: Primary | ICD-10-CM

## 2024-10-21 PROCEDURE — 99214 OFFICE O/P EST MOD 30 MIN: CPT | Performed by: INTERNAL MEDICINE

## 2024-10-21 RX ORDER — HYDRALAZINE HYDROCHLORIDE 25 MG/1
25 TABLET, FILM COATED ORAL 2 TIMES DAILY
COMMUNITY
End: 2024-10-21 | Stop reason: SDUPTHER

## 2024-10-21 RX ORDER — HYDRALAZINE HYDROCHLORIDE 25 MG/1
25 TABLET, FILM COATED ORAL 2 TIMES DAILY
Qty: 60 TABLET | Refills: 3 | Status: SHIPPED | OUTPATIENT
Start: 2024-10-21

## 2024-10-21 NOTE — PROGRESS NOTES
NEPHROLOGY OFFICE FOLLOW UP  Shelia Valdez 54 y.o. female MRN: [id number]    Encounter: [id number] 10/21/2024    ASSESSMENT and PLAN:            REASON FOR VISIT: Shelia Valdez is a 54 y.o. female who is here on 10/21/2024 for Hypertension and Follow-up  .    HPI:    Shelia came in today for follow-up of hypertension and proteinuria    She is doing well overall and denies any acute complaint    No chest pain no palpitation or shortness of breath    No nausea no vomiting    Her blood pressure per to her diastolic is running on higher side but she is not symptomatic        REVIEW OF SYSTEMS:    Review of Systems   Constitutional:  Negative for fatigue.   HENT:  Negative for congestion.    Eyes:  Negative for photophobia and pain.   Respiratory:  Negative for chest tightness and shortness of breath.    Cardiovascular:  Negative for chest pain and palpitations.   Gastrointestinal:  Negative for abdominal distention, abdominal pain and blood in stool.   Endocrine: Negative for polydipsia.   Genitourinary:  Negative for difficulty urinating, dysuria, flank pain, hematuria and urgency.   Musculoskeletal:  Negative for arthralgias and back pain.   Skin:  Negative for rash.   Neurological:  Negative for dizziness, light-headedness and headaches.   Hematological:  Does not bruise/bleed easily.   Psychiatric/Behavioral:  Negative for behavioral problems. The patient is not nervous/anxious.          PAST MEDICAL HISTORY:  Past Medical History:   Diagnosis Date    Asthma N/a    Fatigue     Hypertension     Migraine     Proteinuria        PAST SURGICAL HISTORY:  Past Surgical History:   Procedure Laterality Date    BREAST BIOPSY Left     bengin    ENDOMETRIAL BIOPSY  02/26/2018    FOOT SURGERY Bilateral     reconstructive    HYSTERECTOMY  09/04/2018    TONSILLECTOMY      TUBAL LIGATION         SOCIAL HISTORY:  Social History     Substance and Sexual Activity   Alcohol Use Never     Social History     Substance  "and Sexual Activity   Drug Use Never     Social History     Tobacco Use   Smoking Status Never    Passive exposure: Never   Smokeless Tobacco Never       FAMILY HISTORY:  Family History   Problem Relation Age of Onset    Stroke Mother     Diabetes Mother     Hypertension Mother     Kidney failure Mother     Dialysis Mother     Migraines Mother     Lung cancer Father     Breast cancer Sister 39        Maternal    No Known Problems Daughter     Lung cancer Maternal Grandmother     Breast cancer Half-Sister        MEDICATIONS:    Current Outpatient Medications:     Diclofenac Sodium (VOLTAREN) 1 %, Apply 2 g topically 4 (four) times a day, Disp: 350 g, Rfl: 0    hydrALAZINE (APRESOLINE) 25 mg tablet, Take 1 tablet (25 mg total) by mouth 2 (two) times a day, Disp: 60 tablet, Rfl: 3    nebivolol (BYSTOLIC) 20 MG tablet, TAKE ONE TABLET BY MOUTH DAILY, Disp: 90 tablet, Rfl: 1    SUMAtriptan-naproxen (TREXIMET)  MG per tablet, TAKE 1 TAB BY MOUTH AT HEADACHE ONSET, MAY REPEAT 1 AFTER 2 HOURS AS NEEDED, Disp: 9 tablet, Rfl: 5    PHYSICAL EXAM:  Vitals:    10/21/24 1616   BP: 130/70   Pulse: 61   Resp: 18   Temp: 98.1 °F (36.7 °C)   TempSrc: Temporal   SpO2: 98%   Weight: 96.8 kg (213 lb 6.4 oz)   Height: 5' 4\" (1.626 m)     Body mass index is 36.63 kg/m².    Physical Exam  Constitutional:       General: She is not in acute distress.     Appearance: She is well-developed.   HENT:      Head: Normocephalic.      Mouth/Throat:      Mouth: Mucous membranes are moist.   Eyes:      General: No scleral icterus.     Conjunctiva/sclera: Conjunctivae normal.   Neck:      Vascular: No JVD.   Cardiovascular:      Rate and Rhythm: Normal rate.      Heart sounds: Normal heart sounds.   Pulmonary:      Effort: Pulmonary effort is normal.      Breath sounds: No wheezing.   Abdominal:      Palpations: Abdomen is soft.      Tenderness: There is no abdominal tenderness.   Musculoskeletal:         General: Normal range of motion.      " Cervical back: Neck supple.   Skin:     General: Skin is warm.      Findings: No rash.   Neurological:      Mental Status: She is alert and oriented to person, place, and time.   Psychiatric:         Behavior: Behavior normal.         LAB RESULTS:  Results for orders placed or performed in visit on 09/07/24   Lipid Panel with Direct LDL reflex    Collection Time: 09/07/24  8:05 AM   Result Value Ref Range    Cholesterol 217 (H) See Comment mg/dL    Triglycerides 212 (H) See Comment mg/dL    HDL, Direct 39 (L) >=50 mg/dL    LDL Calculated 136 (H) 0 - 100 mg/dL   Comprehensive metabolic panel    Collection Time: 09/07/24  8:05 AM   Result Value Ref Range    Sodium 135 135 - 147 mmol/L    Potassium 4.3 3.5 - 5.3 mmol/L    Chloride 103 96 - 108 mmol/L    CO2 27 21 - 32 mmol/L    ANION GAP 5 4 - 13 mmol/L    BUN 16 5 - 25 mg/dL    Creatinine 0.65 0.60 - 1.30 mg/dL    Glucose, Fasting 103 (H) 65 - 99 mg/dL    Calcium 9.6 8.4 - 10.2 mg/dL    AST 14 13 - 39 U/L    ALT 14 7 - 52 U/L    Alkaline Phosphatase 77 34 - 104 U/L    Total Protein 6.7 6.4 - 8.4 g/dL    Albumin 3.7 3.5 - 5.0 g/dL    Total Bilirubin 0.68 0.20 - 1.00 mg/dL    eGFR 99 ml/min/1.73sq m   HEMOGLOBIN A1C W/ EAG ESTIMATION    Collection Time: 09/07/24  8:05 AM   Result Value Ref Range    Hemoglobin A1C 5.4 Normal 4.0-5.6%; PreDiabetic 5.7-6.4%; Diabetic >=6.5%; Glycemic control for adults with diabetes <7.0% %     mg/dl   TSH, 3rd generation with Free T4 reflex    Collection Time: 09/07/24  8:05 AM   Result Value Ref Range    TSH 3RD GENERATON 2.754 0.450 - 4.500 uIU/mL   CBC and differential    Collection Time: 09/07/24  8:05 AM   Result Value Ref Range    WBC 5.57 4.31 - 10.16 Thousand/uL    RBC 4.32 3.81 - 5.12 Million/uL    Hemoglobin 13.1 11.5 - 15.4 g/dL    Hematocrit 38.2 34.8 - 46.1 %    MCV 88 82 - 98 fL    MCH 30.3 26.8 - 34.3 pg    MCHC 34.3 31.4 - 37.4 g/dL    RDW 12.0 11.6 - 15.1 %    MPV 11.7 8.9 - 12.7 fL    Platelets 153 149 - 390  Thousands/uL    nRBC 0 /100 WBCs    Segmented % 46 43 - 75 %    Immature Grans % 0 0 - 2 %    Lymphocytes % 40 14 - 44 %    Monocytes % 10 4 - 12 %    Eosinophils Relative 3 0 - 6 %    Basophils Relative 1 0 - 1 %    Absolute Neutrophils 2.58 1.85 - 7.62 Thousands/µL    Absolute Immature Grans 0.01 0.00 - 0.20 Thousand/uL    Absolute Lymphocytes 2.23 0.60 - 4.47 Thousands/µL    Absolute Monocytes 0.56 0.17 - 1.22 Thousand/µL    Eosinophils Absolute 0.16 0.00 - 0.61 Thousand/µL    Basophils Absolute 0.03 0.00 - 0.10 Thousands/µL   Protein / creatinine ratio, urine    Collection Time: 09/07/24  8:05 AM   Result Value Ref Range    Creatinine, Ur 32.6 Reference range not established. mg/dL    Protein Urine Random 5.6 Reference range not established. mg/dL    Prot/Creat Ratio, Ur 0.2 (H) 0.0 - 0.1   UA (URINE) with reflex to Scope    Collection Time: 09/07/24  8:05 AM   Result Value Ref Range    Color, UA Colorless     Clarity, UA Clear     Specific Gravity, UA 1.007 1.003 - 1.030    pH, UA 6.0 4.5, 5.0, 5.5, 6.0, 6.5, 7.0, 7.5, 8.0    Leukocytes, UA Negative Negative    Nitrite, UA Negative Negative    Protein, UA Negative Negative mg/dl    Glucose, UA Negative Negative mg/dl    Ketones, UA Negative Negative mg/dl    Urobilinogen, UA <2.0 <2.0 mg/dl mg/dl    Bilirubin, UA Negative Negative    Occult Blood, UA Negative Negative         Proteinuria  Still has a proteinuria likely hypertension nephrosclerosis.  Cannot give ACE inhibitor as she get anaphylactic reaction.  Advised to lose weight and will monitor    Class 1 obesity due to excess calories without serious comorbidity with body mass index (BMI) of 33.0 to 33.9 in adult  Need to reduce weight and she is well aware of    Essential (primary) hypertension  Diastolic blood pressure is on higher side.  I will add hydralazine 25 twice a day with advised to monitor blood pressure      Everything discussed at length with the patient.  I will see her back in 1 year.  She  "is going to monitor blood pressure and let me know        Portions of the record may have been created with voice recognition software. Occasional wrong word or \"sound a like\" substitutions may have occurred due to the inherent limitations of voice recognition software. Read the chart carefully and recognize, using context, where substitutions have occurred.If you have any questions, please contact the dictating provider.   "

## 2024-10-21 NOTE — ASSESSMENT & PLAN NOTE
Diastolic blood pressure is on higher side.  I will add hydralazine 25 twice a day with advised to monitor blood pressure

## 2024-10-21 NOTE — ASSESSMENT & PLAN NOTE
Still has a proteinuria likely hypertension nephrosclerosis.  Cannot give ACE inhibitor as she get anaphylactic reaction.  Advised to lose weight and will monitor

## 2024-10-21 NOTE — TELEPHONE ENCOUNTER
Reason for call:   [] Refill   [x] Prior Auth  [] Other:     Office:   [x] PCP/Provider - Debbie Guadarrama  [] Specialty/Provider -     Medication: Treximet    Dose/Frequency:  mg     Quantity: 9     Pharmacy: [facility]

## 2024-10-21 NOTE — TELEPHONE ENCOUNTER
PA for SUMAtriptan-naproxen (TREXIMET)  MG per tablet SUBMITTED     via    []CMM-KEY:   [x]Surescripts-Case ID # [id number]   []Availity-Auth ID # NDC #   []Faxed to plan   []Other website   []Phone call Case ID #     Office notes sent, clinical questions answered. Awaiting determination    Turnaround time for your insurance to make a decision on your Prior Authorization can take 7-21 business days.

## 2024-10-25 ENCOUNTER — TELEPHONE (OUTPATIENT)
Age: 54
End: 2024-10-25

## 2024-10-25 NOTE — TELEPHONE ENCOUNTER
Pharmacy comment: Alternative Requested:NEEDS PRIOR AUTH.     Reason for call:   [] Refill   [x] Prior Auth  [] Other:     Office:   [x] PCP/Provider - GLORIA Powell   [] Specialty/Provider -     Medication: SUMAtriptan-naproxen (TREXIMET)  MG per tablet     Dose/Frequency:  TAKE 1 TAB BY MOUTH AT HEADACHE ONSET, MAY REPEAT 1 AFTER 2 HOURS AS NEEDED     Quantity: 9 tablets    Pharmacy: [facility]/pharm[facility] #[id number] - [place] - [address]     Does the patient have enough for 3 days?   [] Yes   [] No - Send as HP to POD

## 2024-10-25 NOTE — TELEPHONE ENCOUNTER
Duplicate encounter created, please see telephone encounter from 10/21/2024 regarding Sumatriptan-naproxen  mg  PA status. Please review patient's chart to see if there is already an encounter regarding the medication in question and to document anything regarding this medication in regards to anything regarding the authorization process etc before creating another encounter Thank You.

## 2024-10-28 ENCOUNTER — OFFICE VISIT (OUTPATIENT)
Dept: OBGYN CLINIC | Facility: CLINIC | Age: 54
End: 2024-10-28
Payer: COMMERCIAL

## 2024-10-28 VITALS
HEART RATE: 60 BPM | DIASTOLIC BLOOD PRESSURE: 86 MMHG | RESPIRATION RATE: 18 BRPM | WEIGHT: 213.8 LBS | BODY MASS INDEX: 36.5 KG/M2 | HEIGHT: 64 IN | SYSTOLIC BLOOD PRESSURE: 151 MMHG | OXYGEN SATURATION: 98 %

## 2024-10-28 DIAGNOSIS — M16.12 OSTEOARTHRITIS OF LEFT HIP, UNSPECIFIED OSTEOARTHRITIS TYPE: ICD-10-CM

## 2024-10-28 PROCEDURE — 99243 OFF/OP CNSLTJ NEW/EST LOW 30: CPT | Performed by: STUDENT IN AN ORGANIZED HEALTH CARE EDUCATION/TRAINING PROGRAM

## 2024-10-28 NOTE — PROGRESS NOTES
ASSESSMENT/PLAN:    {Assess/Plan SmartLinks:92324}    Discussed history, exam, and imaging with patient. Presentation most consistent with *** and we will plan for {operative/non-operative} management at this time.  Discussed oral/topical medication regimen. Will plan for ***  Discussed injections as a pain adjunct. ***  Discussed rehabilitation efforts. Will plan for ***  Discussed advanced imaging in the form of MRI. ***  Follow-up with me ***  _____________________________________________________  CHIEF COMPLAINT:  Chief Complaint   Patient presents with    Left Hip - Pain, Numbness, Tingling     Patient c/o pain for 2 yrs and now pain has flared up. No recent physical therapy or injections.        SUBJECTIVE:  Shelia Valdez is a 54 y.o. year old female, ***HD, who presents for evaluation of ***. The issue began ***    Localization:  Radiation:   Neck pain:  Exacerbating movements:   Positions of comfort:  Attempted oral/topical medications:  Attempted injections:  Attempted physical therapy:    PAST MEDICAL HISTORY:  Past Medical History:   Diagnosis Date    Asthma N/a    Fatigue     Hypertension     Migraine     Proteinuria        PAST SURGICAL HISTORY:  Past Surgical History:   Procedure Laterality Date    BREAST BIOPSY Left     bengin    ENDOMETRIAL BIOPSY  02/26/2018    FOOT SURGERY Bilateral     reconstructive    HYSTERECTOMY  09/04/2018    TONSILLECTOMY      TUBAL LIGATION         FAMILY HISTORY:  Family History   Problem Relation Age of Onset    Stroke Mother     Diabetes Mother     Hypertension Mother     Kidney failure Mother     Dialysis Mother     Migraines Mother     Lung cancer Father     Breast cancer Sister 39        Maternal    No Known Problems Daughter     Lung cancer Maternal Grandmother     Breast cancer Half-Sister        SOCIAL HISTORY:  Social History     Tobacco Use    Smoking status: Never     Passive exposure: Never    Smokeless tobacco: Never   Vaping Use    Vaping status:  Never Used   Substance Use Topics    Alcohol use: Never    Drug use: Never       MEDICATIONS:    Current Outpatient Medications:     Diclofenac Sodium (VOLTAREN) 1 %, Apply 2 g topically 4 (four) times a day, Disp: 350 g, Rfl: 0    hydrALAZINE (APRESOLINE) 25 mg tablet, Take 1 tablet (25 mg total) by mouth 2 (two) times a day, Disp: 60 tablet, Rfl: 3    nebivolol (BYSTOLIC) 20 MG tablet, TAKE ONE TABLET BY MOUTH DAILY, Disp: 90 tablet, Rfl: 1    SUMAtriptan-naproxen (TREXIMET)  MG per tablet, TAKE 1 TAB BY MOUTH AT HEADACHE ONSET, MAY REPEAT 1 AFTER 2 HOURS AS NEEDED, Disp: 9 tablet, Rfl: 5    ALLERGIES:  Allergies   Allergen Reactions    Amoxicillin Hives and Shortness Of Breath     Other reaction(s): Unknown Reaction    Ciprofloxacin Hives and Shortness Of Breath     Other reaction(s): Unknown Reaction    Hydrocodone-Acetaminophen Hives and Shortness Of Breath    Influenza Vaccine Live Hives    Lisinopril Anaphylaxis and Angioedema     Other reaction(s): Unknown Reaction  Other reaction(s): Angioedema    Nitrofurantoin Hives and Shortness Of Breath     Other reaction(s): Unknown Reaction    Sulfa Antibiotics Hives, Shortness Of Breath and Anaphylaxis    Sulfamethoxazole-Trimethoprim Anaphylaxis     Other reaction(s): Unknown Reaction    Influenza Vaccines Other (See Comments)     hives    Oxycodone     Quinolones      Other reaction(s): Unknown Reaction  Other reaction(s): Unknown Reaction  Other reaction(s): Unknown Reaction    Amlodipine Rash     Other reaction(s): Unknown Reaction    Other Hives, Rash and Fever     estrogen patch       Review of systems:   Constitutional: Negative for fatigue, fever or loss of apetite.   HENT: Negative.    Respiratory: Negative for shortness of breath, dyspnea.    Cardiovascular: Negative for chest pain/tightness.   Gastrointestinal: Negative for abdominal pain, N/V.   Endocrine: Negative for cold/heat intolerance, unexplained weight loss/gain.   Genitourinary: Negative  "for flank pain, dysuria, hematuria.   Musculoskeletal: As in HPI  Skin: Negative for rash.    Neurological: Negative for numbness or tingling***  Psychiatric/Behavioral: Negative for agitation.  _____________________________________________________  PHYSICAL EXAMINATION:    Blood pressure 151/86, pulse 60, resp. rate 18, height 5' 4\" (1.626 m), weight 97 kg (213 lb 12.8 oz), SpO2 98%.    General: well developed, well nourished\", alert and oriented times 3, no acute distress  HEENT: Benign, normocephalic, atraumatic  Cardiovascular: ***    Pulmonary: No wheezing or stridor  Abdomen: Soft, Nontender  Skin: No open wounds, erythema, rash  Neurovascular: per examination below    MUSCULOSKELETAL EXAMINATION:        _____________________________________________________  STUDIES REVIEWED:  I have personally reviewed pertinent films in PACS and my interpretation is: ***    Kitty Samuel  "

## 2024-10-28 NOTE — PROGRESS NOTES
ASSESSMENT/PLAN:    Diagnoses and all orders for this visit:    Osteoarthritis of left hip, unspecified osteoarthritis type  -     Ambulatory Referral to Orthopedic Surgery        Discussed history, exam, and imaging with patient. Presentation most consistent with severe osteoarthritis. We discussed both operative and non-operative management with patient today.  Educated patient in detail on the process of a total hip arthroplasty/recovery as she is a ALANNAH candidate.   Discussed oral/topical medication regimen. Will plan for patient continue treatment with Voltaren Gel.   Discussed injections as a pain adjunct. This is a reasonable option, but often times patients with this degree of joint space narrowing do not get significant relief.   Discussed rehabilitation efforts. Discussed hip strengthening exercises and lower body strengthening exercises provided in AVS.   Patient was educated on weight loss as this is the most significant modifiable risk factor for arthritis pain as well as progression of arthritis. Dietary/activity modifications can both contribute to weight loss success.   Follow-up with me in a few weeks after consideration of  continued nonop vs operative intervention.   _____________________________________________________  CHIEF COMPLAINT:  Chief Complaint   Patient presents with    Left Hip - Pain, Numbness, Tingling     Patient c/o pain for 2 yrs and now pain has flared up. No recent physical therapy or injections.        SUBJECTIVE:  Shelia Valdez is a 54 y.o. year old female who presents for evaluation of left hip pain that started about two years ago. She feels most of her pain is located along the iliac crest that radiates laterally the posteriorly into her buttocks and down the leg to the lateral aspect of her knee. She has constant throbbing burning pain when walking or sitting for extended period of time. She has occasional instability episodes, denies falling from when her hip  gives way. She is currently using Voltaren Gel as patient states she is allergic to many medications and this is all she feels comfortable using.   Patient works for [employer] at home in the [employer] [place], in 6 months she plans to go to urology. She used to run recreationally, but the pain has increased over the past 6 months causing her to fear that she was making the condition worse so she discontinued this form of exercises.       PAST MEDICAL HISTORY:  Past Medical History:   Diagnosis Date    Asthma N/a    Fatigue     Hypertension     Migraine     Proteinuria        PAST SURGICAL HISTORY:  Past Surgical History:   Procedure Laterality Date    BREAST BIOPSY Left     bengin    ENDOMETRIAL BIOPSY  02/26/2018    FOOT SURGERY Bilateral     reconstructive    HYSTERECTOMY  09/04/2018    TONSILLECTOMY      TUBAL LIGATION         FAMILY HISTORY:  Family History   Problem Relation Age of Onset    Stroke Mother     Diabetes Mother     Hypertension Mother     Kidney failure Mother     Dialysis Mother     Migraines Mother     Lung cancer Father     Breast cancer Sister 39        Maternal    No Known Problems Daughter     Lung cancer Maternal Grandmother     Breast cancer Half-Sister        SOCIAL HISTORY:  Social History     Tobacco Use    Smoking status: Never     Passive exposure: Never    Smokeless tobacco: Never   Vaping Use    Vaping status: Never Used   Substance Use Topics    Alcohol use: Never    Drug use: Never       MEDICATIONS:    Current Outpatient Medications:     Diclofenac Sodium (VOLTAREN) 1 %, Apply 2 g topically 4 (four) times a day, Disp: 350 g, Rfl: 0    hydrALAZINE (APRESOLINE) 25 mg tablet, Take 1 tablet (25 mg total) by mouth 2 (two) times a day, Disp: 60 tablet, Rfl: 3    nebivolol (BYSTOLIC) 20 MG tablet, TAKE ONE TABLET BY MOUTH DAILY, Disp: 90 tablet, Rfl: 1    SUMAtriptan-naproxen (TREXIMET)  MG per tablet, TAKE 1 TAB BY MOUTH AT HEADACHE ONSET, MAY REPEAT 1 AFTER 2 HOURS AS NEEDED, Disp:  "9 tablet, Rfl: 5    ALLERGIES:  Allergies   Allergen Reactions    Amoxicillin Hives and Shortness Of Breath     Other reaction(s): Unknown Reaction    Ciprofloxacin Hives and Shortness Of Breath     Other reaction(s): Unknown Reaction    Hydrocodone-Acetaminophen Hives and Shortness Of Breath    Influenza Vaccine Live Hives    Lisinopril Anaphylaxis and Angioedema     Other reaction(s): Unknown Reaction  Other reaction(s): Angioedema    Nitrofurantoin Hives and Shortness Of Breath     Other reaction(s): Unknown Reaction    Sulfa Antibiotics Hives, Shortness Of Breath and Anaphylaxis    Sulfamethoxazole-Trimethoprim Anaphylaxis     Other reaction(s): Unknown Reaction    Influenza Vaccines Other (See Comments)     hives    Oxycodone     Quinolones      Other reaction(s): Unknown Reaction  Other reaction(s): Unknown Reaction  Other reaction(s): Unknown Reaction    Amlodipine Rash     Other reaction(s): Unknown Reaction    Other Hives, Rash and Fever     estrogen patch       Review of systems:   Constitutional: Negative for fatigue, fever or loss of apetite.   HENT: Negative.    Respiratory: Negative for shortness of breath, dyspnea.    Cardiovascular: Negative for chest pain/tightness.   Gastrointestinal: Negative for abdominal pain, N/V.   Endocrine: Negative for cold/heat intolerance, unexplained weight loss/gain.   Genitourinary: Negative for flank pain, dysuria, hematuria.   Musculoskeletal: As in HPI   Skin: Negative for rash.    Neurological: Negative for numbness tingling  Psychiatric/Behavioral: Negative for agitation.  _____________________________________________________  PHYSICAL EXAMINATION:    Blood pressure 151/86, pulse 60, resp. rate 18, height 5' 4\" (1.626 m), weight 97 kg (213 lb 12.8 oz), SpO2 98%.    General: well developed and well nourished, alert, oriented times 3, and appears comfortable  HEENT: Benign, normocephalic, atraumatic  Cardiovascular: normal hr    Pulmonary: No wheezing or " stridor  Abdomen: Soft, Nontender  Skin: No masses, erythema, lacerations, fluctation, ulcerations  Neurovascular: as per MSK exam below    MUSCULOSKELETAL EXAMINATION:  Left Hip Exam  No swelling, bruising or deformity  Mild Tender to palpation at the trochanter, nonTTP elsewhere about proximal LE   Passive ROM  Flexion: 85  Internal Rotation: 0  External Rotation: 25  Provocative Maneuvers   (+) Impingement, Stinchfield  (-): straight leg raise  Strength testing  SILT sa/ramos/sp/dp/t  2+ PT pulse     _____________________________________________________  STUDIES REVIEWED:  Images personally reviewed by me today   X-rays of left hip and pelvis from 10/5/2024 demonstrates severe osteoarthritis with complete joint space narrowing and osteophytes to femoral head along with subchondral sclerotis to acetabulum. No evidence of fracture or dislocation.     Scribe Attestation      I,:  Kitty Samuel am acting as a scribe while in the presence of the attending physician.:       I,:  Jamison Hung MD personally performed the services described in this documentation    as scribed in my presence.:

## 2024-11-10 ENCOUNTER — HOSPITAL ENCOUNTER (OUTPATIENT)
Dept: MRI IMAGING | Facility: HOSPITAL | Age: 54
Discharge: HOME | End: 2024-11-10

## 2024-11-10 DIAGNOSIS — R20.2 PARESTHESIA OF LEFT LEG: ICD-10-CM

## 2024-11-10 DIAGNOSIS — M54.42 ACUTE LEFT-SIDED LOW BACK PAIN WITH LEFT-SIDED SCIATICA: ICD-10-CM

## 2024-11-14 DIAGNOSIS — I10 ESSENTIAL (PRIMARY) HYPERTENSION: ICD-10-CM

## 2024-11-14 DIAGNOSIS — R80.1 PERSISTENT PROTEINURIA: ICD-10-CM

## 2024-11-14 RX ORDER — HYDRALAZINE HYDROCHLORIDE 25 MG/1
25 TABLET, FILM COATED ORAL 2 TIMES DAILY
Qty: 180 TABLET | Refills: 1 | Status: SHIPPED | OUTPATIENT
Start: 2024-11-14

## 2024-11-18 ENCOUNTER — TELEPHONE (OUTPATIENT)
Dept: FAMILY MEDICINE CLINIC | Facility: CLINIC | Age: 54
End: 2024-11-18

## 2024-11-18 NOTE — TELEPHONE ENCOUNTER
Tried to call patient to make her aware of this change.     No voicemail set up. [employer] message sent.

## 2024-11-18 NOTE — TELEPHONE ENCOUNTER
----- Message from GLORIA Stewart sent at 11/18/2024  3:10 PM EST -----  Regarding: MRI change  Patient originally had an MRI of her thoracic spine ordered.  Order has been changed to lumbar spine.  Can patient be notified of this change and given number to central scheduling to schedule MRI?    Thank you

## 2024-11-21 NOTE — TELEPHONE ENCOUNTER
Tried to call patient to make her aware of this change.      No voicemail set up. [employer] message sent.

## 2024-11-25 NOTE — TELEPHONE ENCOUNTER
Tried to call patient again. No voicemail set up. I am sending another "[employer]" message and mailing a letter and the updated order to the patient.

## 2025-01-17 ENCOUNTER — TELEPHONE (OUTPATIENT)
Dept: FAMILY MEDICINE CLINIC | Facility: CLINIC | Age: 55
End: 2025-01-17

## 2025-01-28 ENCOUNTER — TELEPHONE (OUTPATIENT)
Dept: NEUROLOGY | Facility: CLINIC | Age: 55
End: 2025-01-28

## 2025-01-28 NOTE — TELEPHONE ENCOUNTER
Unable to confirm appointment with Dr. Kuhn for 1/29/25 for 12 PM.  Phone rang several times then  stopped no one spoke .

## 2025-01-29 ENCOUNTER — PREP FOR PROCEDURE (OUTPATIENT)
Age: 55
End: 2025-01-29

## 2025-01-29 ENCOUNTER — TELEPHONE (OUTPATIENT)
Age: 55
End: 2025-01-29

## 2025-01-29 ENCOUNTER — OFFICE VISIT (OUTPATIENT)
Dept: NEUROLOGY | Facility: CLINIC | Age: 55
End: 2025-01-29
Payer: COMMERCIAL

## 2025-01-29 VITALS
DIASTOLIC BLOOD PRESSURE: 95 MMHG | OXYGEN SATURATION: 98 % | HEART RATE: 71 BPM | BODY MASS INDEX: 36.7 KG/M2 | SYSTOLIC BLOOD PRESSURE: 130 MMHG | HEIGHT: 64 IN

## 2025-01-29 DIAGNOSIS — Z12.11 SCREENING FOR COLON CANCER: Primary | ICD-10-CM

## 2025-01-29 DIAGNOSIS — G43.109 MIGRAINE WITH AURA: Primary | ICD-10-CM

## 2025-01-29 PROCEDURE — 99215 OFFICE O/P EST HI 40 MIN: CPT | Performed by: STUDENT IN AN ORGANIZED HEALTH CARE EDUCATION/TRAINING PROGRAM

## 2025-01-29 RX ORDER — SUMATRIPTAN SUCCINATE 100 MG/1
100 TABLET ORAL ONCE AS NEEDED
Qty: 9 TABLET | Refills: 5 | Status: SHIPPED | OUTPATIENT
Start: 2025-01-29 | End: 2025-07-28

## 2025-01-29 RX ORDER — NAPROXEN 500 MG/1
500 TABLET ORAL 2 TIMES DAILY PRN
Qty: 30 TABLET | Refills: 3 | Status: SHIPPED | OUTPATIENT
Start: 2025-01-29 | End: 2026-01-24

## 2025-01-29 NOTE — TELEPHONE ENCOUNTER
Scheduled date of colonoscopy (as of today): 4-  Physician performing colonoscopy: Dr. Tejeda  Location of colonoscopy: [facility]   Bowel prep reviewed with patient: dulcolax miralax patient requesting for a copy of prep instruction send by mail. Address verified. Thank you  Instructions reviewed with patient by: kellee   Clearances: n/a

## 2025-01-29 NOTE — PROGRESS NOTES
Neurology Ambulatory Visit  Name: Shelia Valdez       : 1970       MRN: [id number]   Encounter Provider: Lizzette Kuhn MD   Encounter Date: 2025  Encounter department: [facility]    Shelia Valdez is a 54 y.o. female with PMH of HTN and asthma who presents for chronic migraine with aura. She currently has good relief with combination sumatriptan/naproxen but still gets 3-9 headaches a week. She would benefit from a preventive medication.     Assessment & Plan  Migraine with aura  - previously on Treximet but denied by insurance  - continue sumatriptan 100mg + naproxen 500mg  - Counseled to use this no more than 2-3 times per week to prevent medication overuse  - start Nurtec 75mg every other day for prevention of migraine  - discussed adding magnesium 400mg or B2 400mg supplements  - f/u with ophthalmology    Abortives tried: sumatriptan (works well)  Preventives tried: topiramate (mood swings), amitriptyline, propranolol contraindicated due to asthma  Orders:    SUMAtriptan (IMITREX) 100 mg tablet; Take 1 tablet (100 mg total) by mouth once as needed for migraine    naproxen (Naprosyn) 500 mg tablet; Take 1 tablet (500 mg total) by mouth 2 (two) times a day as needed for mild pain    rimegepant sulfate (NURTEC) 75 mg TBDP; Take 1 tablet (75 mg total) by mouth every other day for 180 doses    RTC 3 mo      HISTORY OF PRESENT ILLNESS    She currently has a headache behind her left eye. Her Treximet was denied by insurance so she has been out of medication.     Headaches started when she was 5 years old. Headaches are throbbing, stabbing, pulsating, and pressure-like in character, and located on either side of her head, mostly behind her eye and frontal/temporal area but can also be the back of the head and neck.      She has an ophthalmologist but has not seen them in 2 years.     Severity: 10/10  Duration: 2-3 days without medication  Triggers:  Light glare  Aura: Yes, flickering lights  Associated Signs: Nausea, photophobia, phonophobia, dizziness, eye tearing & drooping on side of headache. Her headaches are worse when coughing or sneezing.   Headache days per month: 3-9 per week    Abortives tried: sumatriptan (works well) uses 3-4x per week, Toradol IM (ineffective)  Preventives tried: topiramate (mood swings), amitriptyline    Prior Work-up:   She reports a prior MRI many years ago which was normal. Her headaches have not changed in character since then.     Past Medical History:    Past Medical History:   Diagnosis Date    Asthma N/a    Fatigue     Hypertension     Migraine     Proteinuria      Past Surgical History:   Procedure Laterality Date    BREAST BIOPSY Left     bengin    ENDOMETRIAL BIOPSY  02/26/2018    FOOT SURGERY Bilateral     reconstructive    HYSTERECTOMY  09/04/2018    TONSILLECTOMY      TUBAL LIGATION         Family History:  Family History   Problem Relation Age of Onset    Stroke Mother     Diabetes Mother     Hypertension Mother     Kidney failure Mother     Dialysis Mother     Migraines Mother     Lung cancer Father     Breast cancer Sister 39        Maternal    No Known Problems Daughter     Lung cancer Maternal Grandmother     Breast cancer Half-Sister        Social History:  Social History     Tobacco Use    Smoking status: Never     Passive exposure: Never    Smokeless tobacco: Never   Vaping Use    Vaping status: Never Used   Substance Use Topics    Alcohol use: Never    Drug use: Never        Allergies:  Allergies   Allergen Reactions    Amoxicillin Hives and Shortness Of Breath     Other reaction(s): Unknown Reaction    Ciprofloxacin Hives and Shortness Of Breath     Other reaction(s): Unknown Reaction    Hydrocodone-Acetaminophen Hives and Shortness Of Breath    Influenza Vaccine Live Hives    Lisinopril Anaphylaxis and Angioedema     Other reaction(s): Unknown Reaction  Other reaction(s): Angioedema    Nitrofurantoin  "Hives and Shortness Of Breath     Other reaction(s): Unknown Reaction    Sulfa Antibiotics Hives, Shortness Of Breath and Anaphylaxis    Sulfamethoxazole-Trimethoprim Anaphylaxis     Other reaction(s): Unknown Reaction    Influenza Vaccines Other (See Comments)     hives    Oxycodone     Quinolones      Other reaction(s): Unknown Reaction  Other reaction(s): Unknown Reaction  Other reaction(s): Unknown Reaction    Amlodipine Rash     Other reaction(s): Unknown Reaction    Other Hives, Rash and Fever     estrogen patch       Medications:    Current Outpatient Medications:     Diclofenac Sodium (VOLTAREN) 1 %, Apply 2 g topically 4 (four) times a day, Disp: 350 g, Rfl: 0    hydrALAZINE (APRESOLINE) 25 mg tablet, TAKE 1 TABLET BY MOUTH TWICE A DAY, Disp: 180 tablet, Rfl: 1    naproxen (Naprosyn) 500 mg tablet, Take 1 tablet (500 mg total) by mouth 2 (two) times a day as needed for mild pain, Disp: 30 tablet, Rfl: 3    nebivolol (BYSTOLIC) 20 MG tablet, TAKE ONE TABLET BY MOUTH DAILY, Disp: 90 tablet, Rfl: 1    rimegepant sulfate (NURTEC) 75 mg TBDP, Take 1 tablet (75 mg total) by mouth every other day for 180 doses, Disp: 16 tablet, Rfl: 5    SUMAtriptan (IMITREX) 100 mg tablet, Take 1 tablet (100 mg total) by mouth once as needed for migraine, Disp: 9 tablet, Rfl: 5    SUMAtriptan-naproxen (TREXIMET)  MG per tablet, TAKE 1 TAB BY MOUTH AT HEADACHE ONSET, MAY REPEAT 1 AFTER 2 HOURS AS NEEDED, Disp: 9 tablet, Rfl: 5      OBJECTIVE  /95 (BP Location: Left arm, Patient Position: Sitting, Cuff Size: Large)   Pulse 71   Ht 5' 4\" (1.626 m)   SpO2 98%   BMI 36.70 kg/m²      Labs  I have reviewed pertinent labs:  CBC:   Lab Results   Component Value Date    WBC 5.57 09/07/2024    RBC 4.32 09/07/2024    HGB 13.1 09/07/2024    HCT 38.2 09/07/2024    MCV 88 09/07/2024     09/07/2024    MCH 30.3 09/07/2024    MCHC 34.3 09/07/2024    RDW 12.0 09/07/2024    MPV 11.7 09/07/2024    NEUTROABS 2.58 09/07/2024 "     CMP:   Lab Results   Component Value Date    SODIUM 135 09/07/2024    K 4.3 09/07/2024     09/07/2024    CO2 27 09/07/2024    AGAP 5 09/07/2024    BUN 16 09/07/2024    CREATININE 0.65 09/07/2024    GLUC 101 (H) 01/28/2022    GLUF 103 (H) 09/07/2024    CALCIUM 9.6 09/07/2024    AST 14 09/07/2024    ALT 14 09/07/2024    ALKPHOS 77 09/07/2024    TP 6.7 09/07/2024    ALB 3.7 09/07/2024    TBILI 0.68 09/07/2024    EGFR 99 09/07/2024       Lab Results   Component Value Date/Time    POKXUQZG19 469 10/08/2022 08:45 AM    CYHC99VZCZLR 20.3 (L) 10/14/2023 07:40 AM    TSH 1.87 01/28/2022 08:15 AM    XKM9GTHGIXNT 2.754 09/07/2024 08:05 AM    HGBA1C 5.4 09/07/2024 08:05 AM    HGBA1C 5.6 11/07/2020 07:09 AM              General Exam  GENERAL APPEARANCE:  No distress, alert, interactive and cooperative.  CARDIOVASCULAR: Warm and well perfused  LUNGS: normal work of breathing on room air  EXTREMITIES: no peripheral edema     Neurologic Exam  MENTAL STATE:  Orientation was normal to time, place and person. Recent and remote memory was intact.  Attention span and concentration were normal. Language testing was normal for comprehension, repetition, expression, and naming.      CRANIAL NERVES:  CN 3, 4, 6  Pupils round, 4 mm in diameter, equally reactive to light. Lids symmetric; no ptosis. EOMs normal alignment, full range. No nystagmus.  CN 5  Facial sensation intact bilaterally.  CN 7  Normal and symmetric facial strength.   CN 8  Hearing intact to finger rub  CN 9  Palate elevates symmetrically.  CN 11  Normal strength of shoulder shrug and neck turning.  CN 12  Tongue midline, with normal bulk and strength.     MOTOR:  Muscle bulk and tone were normal in both upper and lower extremities. Muscle strength was 5/5 in distal and proximal muscles in both upper and lower extremities. No fasciculations, tremor or other abnormal movements were present.     REFLEXES:  RIGHT UE   LEFT UE  BR:2              BR:2    Biceps:2       Biceps:2    Triceps:2     Triceps:2       RIGHT LLE   LEFT LLE    Knee:2           Knee:2    Ankle:2         Ankle:2    Babinski: toes downgoing to plantar stimulation. No clonus.     SENSORY:  In both upper and lower extremities, sensation was intact to light touch.     COORDINATION:   In both upper extremities, finger-nose-finger was intact without dysmetria or overshoot. In both lower extremities, heel-to-shin was intact.     GAIT:  Station was stable with a normal base. Gait was stable with a normal arm swing and speed. Tandem gait was intact. No Romberg sign was present.    Administrative Statements   The total amount of time spent with the patient and on chart review and documentation was 40 minutes. Issues addressed during this visit included counseling on diagnosis and prognosis, counseling on medical management, counseling on medication side effects, and counseling on lifestyle.

## 2025-01-29 NOTE — TELEPHONE ENCOUNTER
Mailed to address in patient's chart Colonoscopy Miralax/Dulcolax prep instructions 01/29/2025. Task Complete

## 2025-01-29 NOTE — TELEPHONE ENCOUNTER
01/29/25  Screened by: Rocío Ghosh MA    Referring Provider Dr. Debbie Vergara    Pre- Screening:     There is no height or weight on file to calculate BMI.  Has patient been referred for a routine screening Colonoscopy? yes  Is the patient between 45-75 years old? yes      Previous Colonoscopy yes   If yes:    Date: 5 years     Facility: at [facility]    Reason: routine      SCHEDULING STAFF: If the patient is between 45yrs-49yrs, please advise patient to confirm benefits/coverage with their insurance company for a routine screening colonoscopy, some insurance carriers will only cover at 50yrs or older. If the patient is over 75years old, please schedule an office visit.     Does the patient want to see a Gastroenterologist prior to their procedure OR are they having any GI symptoms? no    Has the patient been hospitalized or had abdominal surgery in the past 6 months? no    Does the patient use supplemental oxygen? no    Does the patient take Coumadin, Lovenox, Plavix, Elliquis, Xarelto, or other blood thinning medication? no    Has the patient had a stroke, cardiac event, or stent placed in the past year? no    SCHEDULING STAFF: If patient answers NO to above questions, then schedule procedure. If patient answers YES to above questions, then schedule office appointment.     If patient is between 45yrs - 49yrs, please advise patient that we will have to confirm benefits & coverage with their insurance company for a routine screening colonoscopy.

## 2025-02-07 ENCOUNTER — TELEPHONE (OUTPATIENT)
Dept: NEUROLOGY | Facility: CLINIC | Age: 55
End: 2025-02-07

## 2025-02-11 ENCOUNTER — HOSPITAL ENCOUNTER (OUTPATIENT)
Age: 55
Discharge: HOME | End: 2025-02-11
Payer: COMMERCIAL

## 2025-02-11 DIAGNOSIS — Z12.31 ENCOUNTER FOR SCREENING MAMMOGRAM FOR MALIGNANT NEOPLASM OF BREAST: ICD-10-CM

## 2025-02-11 PROCEDURE — 77063 BREAST TOMOSYNTHESIS BI: CPT

## 2025-02-11 PROCEDURE — 77067 SCR MAMMO BI INCL CAD: CPT

## 2025-02-11 NOTE — TELEPHONE ENCOUNTER
[facility] 8[facility]25. Please see approval letter under Media tab.    Called pharmacy to make aware and left a detailed VM with a call back if any further assistance is required.    Called pt to make aware and was unable to leave a VM as the VMB was not set up.

## 2025-03-03 ENCOUNTER — OFFICE VISIT (OUTPATIENT)
Dept: FAMILY MEDICINE CLINIC | Facility: CLINIC | Age: 55
End: 2025-03-03
Payer: COMMERCIAL

## 2025-03-03 VITALS
OXYGEN SATURATION: 98 % | BODY MASS INDEX: 36.5 KG/M2 | HEART RATE: 74 BPM | DIASTOLIC BLOOD PRESSURE: 84 MMHG | WEIGHT: 213.8 LBS | SYSTOLIC BLOOD PRESSURE: 132 MMHG | RESPIRATION RATE: 18 BRPM | HEIGHT: 64 IN

## 2025-03-03 DIAGNOSIS — M54.31 SCIATICA OF RIGHT SIDE: Primary | ICD-10-CM

## 2025-03-03 DIAGNOSIS — I10 ESSENTIAL (PRIMARY) HYPERTENSION: ICD-10-CM

## 2025-03-03 PROCEDURE — 99214 OFFICE O/P EST MOD 30 MIN: CPT | Performed by: NURSE PRACTITIONER

## 2025-03-03 RX ORDER — CYCLOBENZAPRINE HCL 5 MG
5 TABLET ORAL 3 TIMES DAILY PRN
Qty: 30 TABLET | Refills: 0 | Status: SHIPPED | OUTPATIENT
Start: 2025-03-03

## 2025-03-03 RX ORDER — PREDNISONE 20 MG/1
40 TABLET ORAL DAILY
Qty: 10 TABLET | Refills: 0 | Status: SHIPPED | OUTPATIENT
Start: 2025-03-03 | End: 2025-03-08

## 2025-03-03 NOTE — PROGRESS NOTES
Name: Shelia Valdez      : 1970      MRN: [id number]  Encounter Provider: LGORIA Gallagher  Encounter Date: 3/3/2025   Encounter department: [facility] [address]  :  Assessment & Plan  Sciatica of right side  To begin cyclobenzaprine 5 mg every 8 hours as needed for muscle spasms and prednisone burst.  Provided referral to physical therapy.  Also provided written instructions on stretching with sciatica.  Follow-up if no improvement in 2 weeks or sooner if symptoms worsen.  Orders:    predniSONE 20 mg tablet; Take 2 tablets (40 mg total) by mouth daily for 5 days    cyclobenzaprine (FLEXERIL) 5 mg tablet; Take 1 tablet (5 mg total) by mouth 3 (three) times a day as needed for muscle spasms    Ambulatory referral to Physical Therapy; Future    Essential (primary) hypertension  Blood pressure stable, to continue current antihypertensive regimen.  Counseled on the importance of maintaining a healthy weight and consuming a low-sodium diet.               History of Present Illness   Shelia presents reporting pain in her right lower back that radiates down her right leg.  This has been occurring for the past week and a half.  She has been applying diclofenac gel and Lidoderm patches without significant improvement.  She has a history of sciatic pain and her symptoms feel similar.  Denies paresthesias, bowel/bladder dysfunction, weakness in extremities, or recent/prior back injuries.  She does have several allergies and is cautious when taking new medications.      Review of Systems   Constitutional: Negative.    HENT: Negative.     Eyes: Negative.    Respiratory: Negative.     Cardiovascular: Negative.    Gastrointestinal: Negative.    Endocrine: Negative.    Genitourinary: Negative.    Musculoskeletal:  Positive for back pain.   Skin: Negative.    Allergic/Immunologic: Negative.    Neurological: Negative.    Hematological: Negative.    Psychiatric/Behavioral:  "Negative.         Objective   /84 (BP Location: Left arm, Patient Position: Sitting)   Pulse 74   Resp 18   Ht 5' 4\" (1.626 m)   Wt 97 kg (213 lb 12.8 oz)   SpO2 98%   BMI 36.70 kg/m²      Physical Exam  Vitals and nursing note reviewed.   Constitutional:       General: She is not in acute distress.     Appearance: Normal appearance. She is well-developed. She is not ill-appearing, toxic-appearing or diaphoretic.   HENT:      Head: Normocephalic and atraumatic.   Eyes:      Conjunctiva/sclera: Conjunctivae normal.   Cardiovascular:      Rate and Rhythm: Normal rate and regular rhythm.      Heart sounds: Normal heart sounds. No murmur heard.  Pulmonary:      Effort: Pulmonary effort is normal. No respiratory distress.      Breath sounds: Normal breath sounds. No wheezing or rales.   Chest:      Chest wall: No tenderness.   Musculoskeletal:      Cervical back: Neck supple.        Legs:       Comments: No obvious deformity in lumbar spine.  +2 patellar reflexes.  Strength 5 out of 5 in lower extremities.  Sensation to light touch intact.   Skin:     General: Skin is warm and dry.      Capillary Refill: Capillary refill takes less than 2 seconds.   Neurological:      General: No focal deficit present.      Mental Status: She is alert and oriented to person, place, and time.   Psychiatric:         Mood and Affect: Mood normal.         Behavior: Behavior normal.         Thought Content: Thought content normal.         Judgment: Judgment normal.         "

## 2025-03-17 ENCOUNTER — EVALUATION (OUTPATIENT)
Dept: PHYSICAL THERAPY | Facility: CLINIC | Age: 55
End: 2025-03-17
Payer: COMMERCIAL

## 2025-03-17 DIAGNOSIS — M54.31 SCIATICA OF RIGHT SIDE: ICD-10-CM

## 2025-03-17 PROCEDURE — 97161 PT EVAL LOW COMPLEX 20 MIN: CPT

## 2025-03-17 PROCEDURE — 97140 MANUAL THERAPY 1/> REGIONS: CPT

## 2025-03-17 PROCEDURE — 97110 THERAPEUTIC EXERCISES: CPT

## 2025-03-17 NOTE — PROGRESS NOTES
PT Evaluation     Today's date: 3/17/2025  Patient name: Shelia Valdez  : 1970  MRN: [id number]  Referring provider: Tamara Ernandez CRNP  Dx:   Encounter Diagnosis     ICD-10-CM    1. Sciatica of right side  M54.31 Ambulatory referral to Physical Therapy          Start Time: 1715  Stop Time: 1800  Total time in clinic (min): 45 minutes    Assessment  Impairments: abnormal or restricted ROM, activity intolerance, pain with function, participation limitations and activity limitations    Assessment details: Patient is a 55 y.o. female who presents to physical therapy with c/o right sided low back pain consistent with lumbar radiculopathy. Patient presents to evaluation with pain, decreased range of motion, decreased strength, and decreased tolerance to activity. Patient demonstrates good tolerance to treatment and was provided with a written copy of their initial home exercise program focusing on hip/low back stretching and was encouraged to perform daily per tolerance. I discussed risks, benefits, and alternatives to treatment, and answered all patient questions to patient satisfaction. Patient presents with baseline FOTO score of 51 indicating limited tolerance/ability to complete ADLs. Patient is an appropriate candidate for skilled PT and would benefit from skilled PT services to address the aforementioned impairments, achieve goals, maximize function, and improve quality of life. Pt is in agreement with this plan.    Patient Education: activity modifications as needed, pacing of activities, importance of HEP compliance, PT prognosis/POC, sitting posture      Goals  ST weeks  Pt will decrease pain to 6/10    Pt will demonstrate improved postural awareness and ability to self-correct without reliance on external cues     LT weeks  Pt will improve FOTO score to > or = to 69 to indicate improved functional abilities   Pt will decrease pain to 0-1/10   Pt will improve AROM to WNL for improved  tolerance with ADLS  Pt will be able to tolerate work activities for 30 minutes without symptoms.                     Plan  Patient would benefit from: PT eval and skilled physical therapy    Planned therapy interventions: manual therapy, home exercise program, graded motor, graded exercise, graded activity, therapeutic activities, therapeutic exercise, therapeutic training, transfer training, strengthening, stretching and flexibility    Frequency: 1-2x week  Plan of Care beginning date: 3/17/2025  Plan of Care expiration date: 2025      Subjective Evaluation    History of Present Illness  Mechanism of injury: Pt is a 54 y/o female who presents to therapy with c/o of low back pain that radiates down her leg. She reports she has a history of left sided hip pain. She admits to favoring her right side since the left hip has flared up. She reports she had right sighted sciatica about 2 years ago. The past two weeks the pain has been unbearable. She has difficulty sitting, bending, climbing stairs, and walking. With her new job she is doing a lot of standing and walking which irritate her symptoms. When the pain did not go away she saw her primary care. She has been taking flexeril and prednisone which she does not think is helping. She reports the pain has been the same intensity. She reports some days are worse than others. She has tried Voltaren and lidocaine patches which do not help with pain either.   Pain  Current pain ratin  At best pain rating: 3  At worst pain rating: 10  Quality: pulling        Objective     General Comments:      Lumbar Comments  Posture: FHP, RS, increased thoracic kyphosis      Lumbar AROM:   Flexion: moderate restriction (pulling)   Extension: maximum restriction  Left Side-bending: moderate restriction   Right Side-bending: moderate restriction      Repeated lumbar flexion in standing: pulling in low back   Repeated lumbar extension in standing: no change   Repeated lumbar  "flexion in lying: not tested  Repeated lumbar extension in lying: not tested    Long axis hip distraction (+)  CASPER: (+)  SLR: (-) pulling in glutes  90/90 HS flexibility: (-)    LE Dermatomes:  L1: Intact/symmetrical  L2: Intact/symmetrical  L3: Intact/symmetrical  L4: Intact/symmetrical  L5: Intact/symmetrical   S1: Intact/symmetrical  S2: Intact/symmetrical     LE Myotomes:  Hip Flexion L2: Left 5/5, Right 4/5  Knee Extension L3: Left 5/5, Right 5/5  Ankle Dorsiflexion L4: Left 5/5, Right 5/5  Hallux Extension L5: Left 5/5, Right 5/5  Ankle Plantarflexion S1: Left 5/5, Right 5/5  Knee Flexion S2: Left 5/5, Right 5/5               POC expires Unit limit Auth Expiration date PT/OT/ST + Visit Limit?   5/12/25 BOMN 12/31/25 BOMN                           Visit/Unit Tracking  AUTH Status:  Date               N/a Used                Remaining                    Diagnosis: lumbar radiculopathy with right sided leg symptoms    Precautions: high blood pressure (runs high even with medication), L sided hip pain   POC Expires: 5/12/25   Re-evaluation Date: 4/14/25   FOTO Scores/Date: Goal -69; 3/17- 51   Visit Count 1/10       Manuals 3/17        R LAD AM                       Ther Ex 3/17                LTR 10x 5\"/HEP        Piriformis/ER stretch 3x30\"/HEP                                                        Neuro Re-Ed                                                               Ther Act                                                                                 Modalities                                            "

## 2025-03-19 ENCOUNTER — OFFICE VISIT (OUTPATIENT)
Dept: PHYSICAL THERAPY | Facility: CLINIC | Age: 55
End: 2025-03-19
Payer: COMMERCIAL

## 2025-03-19 DIAGNOSIS — M54.31 SCIATICA OF RIGHT SIDE: Primary | ICD-10-CM

## 2025-03-19 PROCEDURE — 97110 THERAPEUTIC EXERCISES: CPT

## 2025-03-19 PROCEDURE — 97140 MANUAL THERAPY 1/> REGIONS: CPT

## 2025-03-19 PROCEDURE — 97112 NEUROMUSCULAR REEDUCATION: CPT

## 2025-03-19 NOTE — PROGRESS NOTES
"Daily Note     Today's date: 3/19/2025  Patient name: Shelia Valdez  : 1970  MRN: [id number]  Referring provider: Tamara Ernandez CRNP  Dx:   Encounter Diagnosis     ICD-10-CM    1. Sciatica of right side  M54.31           Start Time: 1630  Stop Time: 1708  Total time in clinic (min): 38 minutes    Subjective: pt reports soreness after eval.       Objective: See treatment diary below      Assessment: Able to progress in therapy with introduction of core stability and hp strengthening exercises. Cueing provided for proper performance of exercises. Pt reports decrease in pain after session. Tolerated treatment well. Patient would benefit from continued PT      Plan: Continue per plan of care.        POC expires Unit limit Auth Expiration date PT/OT/ST + Visit Limit?   25 BOMN 25 BOMN                           Visit/Unit Tracking  AUTH Status:  Date               N/a Used                Remaining                    Diagnosis: lumbar radiculopathy with right sided leg symptoms    Precautions: high blood pressure (runs high even with medication), L sided hip pain   POC Expires: 25   Re-evaluation Date: 25   FOTO Scores/Date: Goal -69; 3/17-    Visit Count 1/10 2/10       Manuals 3/17  3/19      R LAD AM AM                      Ther Ex 3/17  3/19              LTR 10x 5\"/HEP  10x 5\"       Piriformis/ER stretch 3x30\"/HEP  3x30\"       bridge  2x10x5\"      bike  L1 x 5 min                                      Neuro Re-Ed  3/19              TA set  10\" x10       TA march  20x ea       TA BKFO  20x ea       TA SLR  10x ea       Pallof press  10x ea dbl green                     Ther Act                                                                                 Modalities                                            "

## 2025-03-26 ENCOUNTER — OFFICE VISIT (OUTPATIENT)
Dept: PHYSICAL THERAPY | Facility: CLINIC | Age: 55
End: 2025-03-26
Payer: COMMERCIAL

## 2025-03-26 DIAGNOSIS — M54.31 SCIATICA OF RIGHT SIDE: Primary | ICD-10-CM

## 2025-03-26 PROCEDURE — 97110 THERAPEUTIC EXERCISES: CPT

## 2025-03-26 PROCEDURE — 97140 MANUAL THERAPY 1/> REGIONS: CPT

## 2025-03-26 NOTE — PROGRESS NOTES
"Daily Note     Today's date: 3/26/2025  Patient name: Shelia Valdez  : 1970  MRN: [id number]  Referring provider: Tamara Ernandez CRNP  Dx:   Encounter Diagnosis     ICD-10-CM    1. Sciatica of right side  M54.31           Start Time: 1715  Stop Time: 1753  Total time in clinic (min): 38 minutes    Subjective: pt reports she has been feeling better since last session.      Objective: See treatment diary below      Assessment: Able to progress core stability with increased repetitions this session. Limited with L sided SLR due to pre-existing hip pain. Introduced unilateral suitcase carry this session with good tolerance. Updated HEP for patient. Tolerated treatment well. Patient would benefit from continued PT      Plan: Continue per plan of care.        POC expires Unit limit Auth Expiration date PT/OT/ST + Visit Limit?   25 BOMN 25 BOMN                           Visit/Unit Tracking  AUTH Status:  Date               N/a Used                Remaining                    Diagnosis: lumbar radiculopathy with right sided leg symptoms    Precautions: high blood pressure (runs high even with medication), L sided hip pain   POC Expires: 25   Re-evaluation Date: 25   FOTO Scores/Date: Goal -69; 3/17-    Visit Count 1/10 2/10  3/10     Manuals 3/17  3/19 3/26     R LAD AM AM AM                      Ther Ex 3/17  3/19 3/26             LTR 10x 5\"/HEP  10x 5\"  10x 5\"     Piriformis/ER stretch 3x30\"/HEP  3x30\"  3x30\"      bridge  2x10x5\" 2x10x5\" gtb      bike  L1 x 5 min L1 x 5 min                 HEP updated                      Neuro Re-Ed  3/19              TA set  10\" x10  10\" x10      TA march  20x ea  20x ea      TA BKFO  20x ea  20x ea      TA SLR  10x ea  20x R, 10x L      Pallof press  10x ea dbl green 20x ea dbl green     Unilateral suitcase carry   2 laps 5#                    Ther Act                                                                                 Modalities       "

## 2025-03-31 ENCOUNTER — OFFICE VISIT (OUTPATIENT)
Dept: OBGYN CLINIC | Facility: CLINIC | Age: 55
End: 2025-03-31
Payer: COMMERCIAL

## 2025-03-31 ENCOUNTER — PREP FOR PROCEDURE (OUTPATIENT)
Dept: OBGYN CLINIC | Facility: CLINIC | Age: 55
End: 2025-03-31

## 2025-03-31 VITALS — RESPIRATION RATE: 18 BRPM | BODY MASS INDEX: 36.88 KG/M2 | HEIGHT: 64 IN | WEIGHT: 216 LBS

## 2025-03-31 DIAGNOSIS — Z01.818 PREOP TESTING: Primary | ICD-10-CM

## 2025-03-31 DIAGNOSIS — M16.12 OSTEOARTHRITIS OF LEFT HIP, UNSPECIFIED OSTEOARTHRITIS TYPE: Primary | ICD-10-CM

## 2025-03-31 PROCEDURE — 99214 OFFICE O/P EST MOD 30 MIN: CPT | Performed by: STUDENT IN AN ORGANIZED HEALTH CARE EDUCATION/TRAINING PROGRAM

## 2025-03-31 RX ORDER — FOLIC ACID 1 MG/1
1 TABLET ORAL DAILY
Qty: 30 TABLET | Refills: 0 | Status: SHIPPED | OUTPATIENT
Start: 2025-03-31

## 2025-03-31 RX ORDER — CHLORHEXIDINE GLUCONATE 40 MG/ML
SOLUTION TOPICAL DAILY PRN
OUTPATIENT
Start: 2025-03-31

## 2025-03-31 RX ORDER — ASCORBIC ACID 500 MG
500 TABLET ORAL 2 TIMES DAILY
Qty: 30 TABLET | Refills: 0 | Status: SHIPPED | OUTPATIENT
Start: 2025-03-31

## 2025-03-31 RX ORDER — VITAMIN B COMPLEX
1000 TABLET ORAL DAILY
Qty: 30 TABLET | Refills: 0 | Status: SHIPPED | OUTPATIENT
Start: 2025-03-31

## 2025-03-31 RX ORDER — MULTIVIT-MIN/IRON FUM/FOLIC AC 7.5 MG-4
1 TABLET ORAL DAILY
Qty: 30 TABLET | Refills: 0 | Status: SHIPPED | OUTPATIENT
Start: 2025-03-31

## 2025-03-31 RX ORDER — ZINC SULFATE 50(220)MG
220 CAPSULE ORAL DAILY
Qty: 30 CAPSULE | Refills: 0 | Status: SHIPPED | OUTPATIENT
Start: 2025-03-31

## 2025-03-31 RX ORDER — FERROUS SULFATE 324(65)MG
324 TABLET, DELAYED RELEASE (ENTERIC COATED) ORAL
Qty: 30 TABLET | Refills: 0 | Status: SHIPPED | OUTPATIENT
Start: 2025-03-31

## 2025-03-31 RX ORDER — CHLORHEXIDINE GLUCONATE ORAL RINSE 1.2 MG/ML
15 SOLUTION DENTAL ONCE
OUTPATIENT
Start: 2025-03-31 | End: 2025-03-31

## 2025-03-31 RX ORDER — MUPIROCIN 20 MG/G
OINTMENT TOPICAL
Qty: 15 G | Refills: 1 | Status: SHIPPED | OUTPATIENT
Start: 2025-03-31

## 2025-03-31 NOTE — PROGRESS NOTES
[facility] Follow-up Note    Patient Name:  Shelia Valdez  MRN:  [id number]  Date of last visit: 10/28/2024     Assessment/Plan:     Assessment & Plan  Osteoarthritis of left hip, unspecified osteoarthritis type  Discussed history, exam, and imaging with patient. Presentation most consistent with left hip osteoarthritis. Management options were discussed.  Conservative treatment entails continued activity modification with physical therapy to strengthen muscles about hip and core to improve gait pattern and help offload joint. Patient may supplement with oral medications including Tylenol, anti-inflammatories +/- Tramadol for severe pain. Intermittent injections are also an option under ultrasound guidance. Further, weight loss continues to be the biggest modifiable factor that would not only allow for improvement in pain, but potential slowing of arthritis progression. The benefit of conservative treatment is the avoidance of risks associated with arthroplasty, some of which are lifelong, as well as the risks of anesthesia. The downside of conservative management is potential continued pain.   Discussed surgical intervention in the form of total hip arthroplasty including the surgical technique - multiple approaches are an option with standard generally being an anterior vs posterior approach, either of which provide excellent outcomes. I traditionally perform the anterior approach. Discussed the main risks of surgical intervention including infection, blood loss, damage to surrounding structures, numbness to lateral thigh secondary to LFCN damage, dislocation, limb length discrepancy, iatrogenic fracture, periprosthetic fracture, gait imbalance, chronic pain, inability to return to activities at same level or all desired activities, DVT/PE and possible need for repeat procedure. Risks of anesthesia discussed including potential cardiovascular or pulmonary complications as well as possible  "death. Traditionally a spinal anesthetic is completed if feasible, which has been shown to have better perioperative outcomes. Discussed the post operative period and rehab plan. Patient specific risk factors include: obesity which may increase risk for infection and decrease overall PROM. The benefit to surgery is potential resolution to hip pain from arthritis.   After thorough discussion, patient elects to proceed forward with surgical intervention. Preop testing ordered, preoperative vitamins and Mupirocin ordered and patient met with surgical schedulers to discuss further perioperative preparation and selection of a surgical date.   Order placed for preoperative risk stratification by our Primary Care team.  Follow-up with me post operatively. If patient has any questions or concerns beforehand they may reach out to our office any time and I can return call or send Down message.     Return for surgery.      Subjective   Sheliakumar Valdez returns for follow-up of left hip osteoarthritis, approximately 5 month(s) since last visit. At that time, the plan discussed was for: over the counter analgesics, a home exercise program, and an attempt at weight loss. Injections were likewise discussed, but we stated that generally with her degree of arthritis patients don't get significant long-term relief.     Pain has worsened since her last visit. Pain is localized to the groin. She has difficulty with putting on her shoes and tieing them. She has attended physical therapy recently for right sided sciatica, but notes this pain is completely different. Her PCP recently prescribed her flexeril and prednisone without improvement in symptoms. She was previously very active but her hip continues to limit her mobility. She feels that her hips are roughly level.    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 She lives with her .    Current VAS: 10/10      Objective     Resp 18   Ht 5' 4\" (1.626 m)   Wt 98 kg (216 lb)  "  BMI 37.08 kg/m²     Left Hip Exam   No swelling, bruising or deformity  Skin intact without rash  Leg lengths are equal   Mild Tender to palpation at the greater trochanter, nonTTP elsewhere about proximal LE   Passive ROM  Flexion: 85  Internal Rotation: 5  External Rotation: 45  Provocative Maneuvers   (+) Impingement, Stinchfield  (-): straight leg raise  Strength testing  4/5 hip flexor   SILT sa/ramos/sp/dp/t  2+ PT pulse    Data Review     I have personally reviewed pertinent films in PACS:     X-rays of left hip and pelvis from 10/5/2024 including AP pelvis, AP hip, and frog lateral reviewed by myself demonstrate complete joint space narrowing at weightbearing dome with femoral head osteophyte formation and mild subchondral sclerosis to acetabulum. No evidence of fracture or dislocation.     Pertinent PMH/Meds/Allergies reviewed as listed below:  Past Medical History:   Diagnosis Date    Asthma N/a    Fatigue     Hypertension     Migraine     Proteinuria       Current Outpatient Medications:     cyclobenzaprine (FLEXERIL) 5 mg tablet, Take 1 tablet (5 mg total) by mouth 3 (three) times a day as needed for muscle spasms, Disp: 30 tablet, Rfl: 0    Diclofenac Sodium (VOLTAREN) 1 %, Apply 2 g topically 4 (four) times a day, Disp: 350 g, Rfl: 0    hydrALAZINE (APRESOLINE) 25 mg tablet, TAKE 1 TABLET BY MOUTH TWICE A DAY, Disp: 180 tablet, Rfl: 1    nebivolol (BYSTOLIC) 20 MG tablet, TAKE ONE TABLET BY MOUTH DAILY, Disp: 90 tablet, Rfl: 1    rimegepant sulfate (NURTEC) 75 mg TBDP, Take 1 tablet (75 mg total) by mouth every other day for 180 doses, Disp: 16 tablet, Rfl: 5    SUMAtriptan (IMITREX) 100 mg tablet, Take 1 tablet (100 mg total) by mouth once as needed for migraine, Disp: 9 tablet, Rfl: 5    naproxen (Naprosyn) 500 mg tablet, Take 1 tablet (500 mg total) by mouth 2 (two) times a day as needed for mild pain (Patient not taking: Reported on 3/31/2025), Disp: 30 tablet, Rfl: 3   Allergies   Allergen  Reactions    Amoxicillin Hives and Shortness Of Breath     Other reaction(s): Unknown Reaction    Ciprofloxacin Hives and Shortness Of Breath     Other reaction(s): Unknown Reaction    Hydrocodone-Acetaminophen Hives and Shortness Of Breath    Influenza Vaccine Live Hives    Lisinopril Anaphylaxis and Angioedema     Other reaction(s): Unknown Reaction  Other reaction(s): Angioedema    Nitrofurantoin Hives and Shortness Of Breath     Other reaction(s): Unknown Reaction    Sulfa Antibiotics Hives, Shortness Of Breath and Anaphylaxis    Sulfamethoxazole-Trimethoprim Anaphylaxis     Other reaction(s): Unknown Reaction    Influenza Vaccines Other (See Comments)     hives    Oxycodone     Quinolones      Other reaction(s): Unknown Reaction  Other reaction(s): Unknown Reaction  Other reaction(s): Unknown Reaction    Amlodipine Rash     Other reaction(s): Unknown Reaction    Other Hives, Rash and Fever     estrogen patch       Scribe Attestation      I,:  Liv Bartlett am acting as a scribe while in the presence of the attending physician.:       I,:  Jamison Hung MD personally performed the services described in this documentation    as scribed in my presence.:

## 2025-03-31 NOTE — PATIENT INSTRUCTIONS
Total Hip Replacement  Whether you have just begun exploring treatment options or have already decided to undergo hip replacement surgery, this information will help you understand the benefits and limitations of total hip replacement. This article describes:  How a normal hip works  The causes of hip pain  What to expect from hip replacement surgery  What exercises and activities will help restore your mobility and strength, and enable you to return to everyday activities  If your hip has been damaged by arthritis, a fracture, or other conditions, common activities such as walking or getting in and out of a chair may be painful and difficult. Your hip may be stiff, and it may be hard to put on your shoes and socks. You may even feel uncomfortable while resting.    If medications, changes in your everyday activities, and the use of walking supports do not adequately help your symptoms, you may consider hip replacement surgery. Hip replacement surgery is a safe and effective procedure that can relieve your pain, increase motion, and help you get back to enjoying normal, everyday activities.    Hip replacement surgery is one of the most successful operations in all of medicine. Since the early 1960s, improvements in joint replacement surgical techniques and technology have greatly increased the effectiveness of total hip replacement. According to the [employer], more than 450,000 total hip replacements are performed each year in the [place].    Anatomy  The hip is one of the body's largest joints. It is a ball-and-socket joint. The socket is formed by the acetabulum, which is part of the pelvis bone. The ball is the femoral head, which is the upper end of the femur (thighbone).    The bone surfaces of the ball and socket are covered with articular cartilage, a smooth tissue that cushions the ends of the bones and enables them to move easily.    A thin tissue called the synovial  membrane surrounds the hip joint. In a healthy hip, this membrane makes a small amount of fluid that lubricates the cartilage and eliminates almost all friction during hip movement.    Bands of tissue called ligaments (the hip capsule) connect the ball to the socket and provide stability to the joint.    Normal hip anatomy.     Common Causes of Hip Pain  The most common cause of chronic hip pain and disability is arthritis. Osteoarthritis, rheumatoid arthritis, and traumatic arthritis are the most common forms of this disease.  Osteoarthritis. This is an age-related wear and tear type of arthritis. It usually occurs in people 50 years of age and older and often in individuals with a family history of arthritis. The cartilage cushioning the bones of the hip wears away. The bones then rub against each other, causing hip pain and stiffness. Osteoarthritis may also be caused or accelerated by subtle irregularities in how the hip developed in childhood.  Rheumatoid arthritis. This is an autoimmune disease in which the synovial membrane becomes inflamed and thickened. This chronic inflammation can damage the cartilage, leading to pain and stiffness. Rheumatoid arthritis is the most common type of a group of disorders termed inflammatory arthritis.  Posttraumatic arthritis. This can follow a serious hip injury or fracture. The cartilage may become damaged and lead to hip pain and stiffness over time.  Osteonecrosis. An injury to the hip, such as a dislocation or fracture, may limit the blood supply to the femoral head. This is called osteonecrosis (also sometimes referred to as avascular necrosis). The lack of blood may cause the surface of the bone to collapse, and arthritis will result. Some diseases can also cause osteonecrosis.  Childhood hip disease. Some infants and children have hip problems. Even though the problems are successfully treated during childhood, they may still cause arthritis later in life. This  "happens because the hip may not grow normally, and the joint surfaces are affected.    In hip osteoarthritis, the smooth articular cartilage wears away and becomes frayed and rough.     Description  In a total hip replacement (also called total hip arthroplasty), the damaged bone and cartilage is removed and replaced with prosthetic components.  The damaged femoral head is removed and replaced with a metal stem that is placed into the hollow center of the femur. The femoral stem may be either cemented or \"press fit\" into the bone.  A metal or ceramic ball is placed on the upper part of the stem. This ball replaces the damaged femoral head that was removed.  The damaged cartilage surface of the socket (acetabulum) is removed and replaced with a metal socket. Screws or cement are sometimes used to hold the socket in place.  A plastic, ceramic, or metal spacer is inserted between the new ball and the socket to allow for a smooth gliding surface.    (Left) The individual components of a total hip replacement. (Center) The components merged into an implant. (Right) The implant as it fits into the hip.      Watch: Total Hip Replacement Animation    Is Hip Replacement Surgery for You?  The decision to have hip replacement surgery should be a cooperative one made by you, your family, your primary care doctor, and your orthopaedic surgeon. The process of making this decision typically begins with a referral by your doctor to an orthopaedic surgeon for an initial evaluation.    When Surgery Is Recommended  There are several reasons why your doctor may recommend hip replacement surgery. People who benefit from hip replacement surgery often have:  Hip pain that limits everyday activities, such as walking or bending  Hip pain that continues while resting, either day or night  Stiffness in a hip that limits the ability to move or lift the leg  Inadequate pain relief from anti-inflammatory drugs, physical therapy, or walking " supports    Candidates for Surgery  There are no absolute age or weight restrictions for total hip replacements. Recommendations for surgery are based on a patient's pain and disability, not age. Most patients who undergo total hip replacement are age 50 to 80, but orthopaedic surgeons evaluate patients individually. Total hip replacements have been performed successfully at all ages, from the young teenager with juvenile arthritis to the elderly patient with degenerative arthritis.    The Orthopaedic Evaluation  An evaluation with an orthopaedic surgeon consists of several components:  Medical history. Your orthopaedic surgeon will gather information about your general health and ask questions about the extent of your hip pain and how it affects your ability to perform everyday activities.  Physical examination. This will assess hip mobility, strength, and alignment.  X-rays. These images help to determine the extent of damage or deformity in your hip.  Other tests. Occasionally other tests, such as a magnetic resonance imaging (MRI) scan, may be needed to determine the condition of the bone and soft tissues of your hip.      (Left) In this X-ray of a normal hip, the space between the ball and socket indicates healthy cartilage. (Right) This X-ray of an arthritic hip shows severe loss of joint space.     Deciding to Have Hip Replacement Surgery    Talk With Your Doctor  Your orthopaedic surgeon will review the results of your evaluation with you and discuss whether hip replacement surgery is the best method to relieve your pain and improve your mobility. Other treatment options -- such as medications, physical therapy, or other types of surgery -- also may be considered.    In addition, your orthopaedic surgeon will explain the potential risks and complications of hip replacement surgery, including those related to the surgery itself and those that can occur over time after your surgery.    Never hesitate to ask  your doctor questions when you do not understand. The more you know, the better you will be able to manage the changes that hip replacement surgery will make in your life.    Realistic Expectations  An important factor in deciding whether to have hip replacement surgery is understanding what the procedure can and cannot do. Most people who undergo hip replacement surgery experience a dramatic reduction of hip pain and a significant improvement in their ability to perform the common activities of daily living.    With normal use and activity, the material between the head and the socket of every hip replacement implant begins to wear. Excessive activity or being overweight may speed up this normal wear and cause the hip replacement to loosen and become painful. Therefore, most surgeons advise against high-impact activities such as running, jogging, jumping, or other high-impact sports.    Realistic activities following total hip replacement include unlimited walking, swimming, golf, driving, hiking, biking, dancing, and other low-impact sports.    With appropriate activity modification, hip replacements can last for many years.    Preparing for Surgery    Medical Evaluation  If you decide to have hip replacement surgery, your orthopaedic surgeon may ask you to have a complete physical examination by your primary care doctor before your surgical procedure. This is needed to make sure you are healthy enough to have the surgery and complete the recovery process. Many patients with chronic medical conditions, like heart disease, may also be evaluated by a specialist, such a cardiologist, before the surgery.    Tests  Several tests, such as blood and urine samples, an electrocardiogram (EKG), and chest x-rays, may be needed to help plan your surgery.    Preparing Your Skin  Your skin should not have any infections or irritations before surgery. If either is present, contact your orthopaedic surgeon for treatment to  improve your skin before surgery.    Medications  Tell your orthopaedic surgeon about the medications you are taking. They or your primary care doctor will advise you which medications you should stop taking and which you can continue to take before surgery.    Weight Loss  If you are overweight, your doctor may ask you to lose some weight before surgery to minimize the stress on your new hip and possibly decrease the risks of surgery.    Dental Evaluation  Although infections after hip replacement are not common, an infection can occur if bacteria enter your bloodstream. Because bacteria can enter the bloodstream during dental procedures, major dental procedures (such as tooth extractions and periodontal work) should be completed before your hip replacement surgery. Routine cleaning of your teeth should be delayed for several weeks after surgery.    Urinary Evaluation  Individuals with a history of recent or frequent urinary infections should have a urological evaluation before surgery. Older men with prostate disease should consider completing required treatment before having surgery.    Social Planning  Although you will be able to walk with a cane, crutches, or a walker soon after surgery, you may need some help for several weeks with such tasks as cooking, shopping, bathing, and laundry.    If you live alone, a  or a discharge planner at the hospital can help you make advance arrangements to have someone assist you at your home. A short stay in an extended care facility during your recovery after surgery also may be arranged.    Home Planning  Several modifications can make your home easier to navigate during your recovery. The following items may help with daily activities:  Securely fastened safety bars or handrails in your shower or bath  Secure handrails along all stairways  A stable chair for your early recovery with a firm seat cushion (that allows your knees to remain lower than your hips),  "a firm back, and two arms  A raised toilet seat  A stable shower bench or chair for bathing  A long-handled sponge and shower hose  A dressing stick, a sock aid, and a long-handled shoehorn for putting on and taking off shoes and socks without excessively bending your new hip  A reacher that will allow you to grab objects without excessive bending of your hips  Firm pillows for your chairs, sofas, and car that enable you to sit with your knees lower than your hips  Removal of all loose carpets and electrical cords from the areas where you walk in your home    Get more tips on preparing your home for your total hip replacement in this infographic (click on image for full infographic).      Your Surgery  You will either be admitted to the hospital on the day of your surgery or you will go home the same day. The plan to either be admitted or to go home should be discussed with your surgeon prior to your operation.    Anesthesia  Upon arrival at the hospital or surgery center, you will be evaluated by a member of the anesthesia team. The most common types of anesthesia are general anesthesia (you are put to sleep) or spinal, epidural, or regional nerve block anesthesia (you are awake but your body is numb from the waist down). The anesthesia team, with your input, will determine which type of anesthesia will be best for you.    Implant Components  Many different types of designs and materials are currently used in artificial hip joints. All of them consist of two basic components: the ball component (made of highly polished strong metal or ceramic material) and the socket component (a durable cup of plastic, ceramic, or metal, which may have an outer metal shell).    The prosthetic components may be \"press fit\" into the bone to allow your bone to grow onto the components or they may be cemented into place. The decision to press fit or to cement the components is based on several factors, such as the quality and strength " of your bone. A combination of a cemented stem and a non-cemented socket may also be used.    Your orthopaedic surgeon will choose the type of prosthesis that best meets your needs.      (Left) A standard non-cemented femoral component. (Center) A close-up of this component showing the porous surface for bone ingrowth. (Right) The femoral component and the acetabular component working together.       (Left) The acetabular component shows the plastic (polyethylene) liner inside the metal shell. (Right) The porous surface of this acetabular component allows for bone ingrowth. The holes around the cup are used if screws are needed to hold the cup in place.         Procedure  The surgical procedure usually takes from 1 to 2 hours. Your orthopaedic surgeon will remove the damaged cartilage and bone and then position new metal, plastic, or ceramic implants to restore the alignment and function of your hip.      X-rays before and after total hip replacement. In this case, non-cemented components were used.     After surgery, you will be moved to the recovery room where you will remain for several hours while your recovery from anesthesia is monitored. After you wake up, you will be taken to your hospital room or discharged to home.    Recovery  The success of your surgery will depend in large measure on how well you follow your orthopaedic surgeon's instructions regarding home care during the first few weeks after surgery.    Pain Management  Medications are often prescribed for short-term pain relief after surgery. Many types of medicines are available to help manage pain, including opioids, nonsteroidal anti-inflammatory drugs (NSAIDs), acetaminophen, and local anesthetics. Your doctor may use a combination of these medications to improve pain relief, as well as minimize the need for opioids.    Be aware that, although opioids help relieve pain after surgery, they are a narcotic and can be addictive. Opioid dependency  and overdose have become critical public health issues in the [place] It is important to use opioids only as directed by your doctor and to stop taking them as soon as your pain begins to improve. Talk to your doctor if your pain has not begun to improve within a few days of your surgery.    Wound Care  You may have stitches or staples running along your wound or a suture beneath your skin. The stitches or staples will be removed approximately 2 weeks after surgery.    Avoid getting the wound wet until it has thoroughly sealed and dried. You may continue to bandage the wound to prevent irritation from clothing or support stockings.    Diet  Some loss of appetite is common for several weeks after surgery. A balanced diet, often with an iron supplement, is important to promote proper tissue healing and restore muscle strength. Be sure to drink plenty of fluids.    Activity  Exercise is a critical component of home care, particularly during the first few weeks after surgery. You should be able to resume most normal light activities of daily living within 3 to 6 weeks following surgery. Some discomfort with activity and at night is common for several weeks.    Your activity program should include:  A graduated walking program -- initially in your home and later outside -- to slowly increase your mobility,   Resuming other normal household activities, such as sitting, standing, and climbing stairs  Specific exercises several times a day to restore movement and strengthen your hip. You probably will be able to perform the exercises without help, but you may have a physical therapist help you at home or in a therapy center the first few weeks after surgery      Physical therapy will help restore strength and mobility to your hip,      [employer] © 2011       Possible Complications of Surgery  The complication rate following hip replacement surgery is low. Serious complications, such as joint infection, occur in less than 2%  of patients. Major medical complications, such as heart attack or stroke, occur even less frequently. However, chronic illnesses may increase the potential for complications. Although uncommon, when these complications do occur, they can prolong or limit full recovery.    Infection  Infection may occur superficially in the wound or deep around the prosthesis. It may happen within days or weeks of surgery. It may even occur years later.    Minor infections of the wound are generally treated with antibiotics. Major or deep infections may require more surgery and removal of the prosthesis. Any infection in your body can spread to your joint replacement.    Blood Clots  Blood clots in the leg veins or pelvis are one of the most common complications of hip replacement surgery. These clots can be life-threatening if they break free and travel to your lungs. Your orthopaedic surgeon will outline a prevention program which may include blood thinning medications, support hose, inflatable leg coverings, ankle pump exercises, and early mobilization.      Blood clots may form in one of the deep veins of the body.   While blood clots can occur in any deep vein, they most commonly form in the veins of the pelvis, calf, or thigh.      Leg-length Inequality  Sometimes after a hip replacement, one leg may feel longer or shorter than the other. Your orthopaedic surgeon will make every effort to make your leg lengths even but may lengthen or shorten your leg slightly to maximize the stability and biomechanics of the hip. Some patients may feel more comfortable with a shoe lift after surgery.    Dislocation      Hip implant dislocation.     This occurs when the ball comes out of the socket. The risk for dislocation is greatest in the first few months after surgery while the tissues are healing. Dislocation is uncommon. If the ball does come out of the socket, a closed reduction usually can put it back into place without the need for  more surgery. In situations in which the hip continues to dislocate, further surgery may be necessary.    Loosening and Implant Wear  Over years, the hip prosthesis may wear out or loosen. This is most often due to everyday activity. It can also result from a biologic thinning of the bone called osteolysis. If loosening is painful, a second surgery called a revision may be necessary.    Other Complications  Nerve and blood vessel injury, bleeding, fracture, and stiffness can occur. A small number of patients continue to experience pain after surgery.    Avoiding Problems After Surgery  Recognizing the Signs of a Blood Clot  Follow your orthopaedic surgeon's instructions carefully to reduce the risk of blood clots developing during the first several weeks of your recovery. They may recommend that you continue taking the blood thinning medication you started in the hospital. Notify your doctor immediately if you develop any of the following warning signs.    Warning signs of blood clots. The warning signs of possible blood clot in your leg include:  Pain in your calf and leg that is unrelated to your incision  Tenderness or redness of your calf  New or increasing swelling of your thigh, calf, ankle, or foot  Warning signs of pulmonary embolism. The warning signs that a blood clot has traveled to your lung include:  Sudden shortness of breath  Sudden onset of chest pain  Localized chest pain with coughing    Preventing Infection  A common cause of infection following hip replacement surgery is from bacteria that enter the bloodstream during dental procedures, urinary tract infections, or skin infections.    Following surgery, patients with certain risk factors may need to take antibiotics prior to dental work, including dental cleanings, or before any surgical procedure that could allow bacteria to enter the bloodstream. Your orthopaedic surgeon will discuss with you whether you need to take preventive antibiotics  before dental procedures.    Warning signs of infection. Notify your doctor immediately if you develop any of the following signs of a possible hip replacement infection:  Persistent fever (higher than 100°F orally)  Chills  Increasing redness, tenderness, or swelling of the hip wound  Drainage from the hip wound  Increasing hip pain with both activity and rest    Avoiding Falls  A fall during the first few weeks after surgery can damage your new hip and may result in a need for more surgery. Stairs are a particular hazard until your hip is strong and mobile. You should use a cane, crutches, a walker, or handrails or have someone help you until you improve your balance, flexibility, and strength.    Your orthopaedic surgeon and physical therapist will help you decide which assistive aides will be required following surgery, and when those aides can safely be discontinued.    Other Precautions  To assure proper recovery and prevent dislocation of the prosthesis, you may be asked to take special precautions when sitting, bending, or sleeping -- usually for the first 6 weeks after surgery. These precautions will vary from patient to patient, depending on the surgical approach your surgeon used to perform your hip replacement.    Your surgeon and physical therapist will provide you with any specific precautions you should follow.    Outcomes  How Your New Hip Is Different  You may feel some numbness in the skin around your incision. You also may feel some stiffness, particularly with excessive bending. These differences often diminish with time, and most patients find these are minor compared with the pain and limited function they experienced prior to surgery.    Your new hip may activate metal detectors required for security in airports and some buildings. Tell the  about your hip replacement if the alarm is activated.     Protecting Your Hip Replacement  There are many things you can do to protect your  hip replacement and extend the life of your hip implant.  Participate in a regular light exercise program to maintain proper strength and mobility of your new hip.  Take special precautions to avoid falls and injuries. If you break a bone in your leg, you may require more surgery.  Make sure your dentist knows that you have a hip replacement. Talk with your orthopaedic surgeon about whether you need to take antibiotics prior to dental procedures.  See your orthopaedic surgeon periodically for routine follow-up examinations and X-rays, even if your hip replacement seems to be doing fine.    Activities After Total Hip Replacement  After having a total hip replacement, you may expect your lifestyle to be a lot like how it was before surgery -- but without the pain. In many ways, you are right, but returning to your everyday activities will take time. Being an active participant in the healing process can help you get there sooner and ensure a more successful outcome.    Even though you will be able to resume most activities, you may have to change the way you do them. For example, you may have to learn new ways of bending down that keep your new hip safe. The suggestions you find here will help you enjoy your new hip while you safely resume your daily routines.    Hospital Discharge  Your hospital stay will typically last from 1 to 2 days, depending on the speed of your recovery. If your hip replacement is done on an outpatient basis, you will go home on the same day as surgery.    Before you are discharged from the hospital, you will need to accomplish several goals, such as:  Getting in and out of bed by yourself.  Having acceptable pain control.  Being able to eat, drink, and use the bathroom.  Walking with an assistive device (a cane, walker, or crutches) on a level surface and being able to climb up and down two or three stairs.  Being able to perform the prescribed home exercises.  Understanding any hip  precautions you may have been given to prevent injury and ensure proper healing.  If you are not yet able to accomplish these goals, if you do not have someone to help you at home, or there is another barrier to caring for yourself at home, it may be unsafe for you to go directly home after discharge. If this is the case, you may be temporarily transferred to a rehabilitation or skilled nursing center.    If you are discharged directly home, your healthcare team will provide you with information to support your recovery at home. It is important to have a family member or other caregiver with you when reviewing these instructions, as it will be helpful for them to be prepared for your recovery at home. Although the complication rate after total hip replacement is low, when complications do occur, they can prolong or limit full recovery. Hospital staff will discuss possible complications, and review with you the warning signs of problems such as an infection or a blood clot.    Warning Signs of Infection  Persistent fever (higher than 100°)  Shaking chills  Increasing redness, tenderness or swelling of your wound  Drainage from your wound  Increasing pain with both activity and rest    Warning Signs of a Blood Clot  Pain in your leg or calf unrelated to your incision  Tenderness or redness above or below your knee  Severe swelling of your thigh, calf, ankle, or foot that does not resolve with elevation of the leg.  In very rare cases, a blood clot may travel to your lungs and become life-threatening. Signs that a blood clot has traveled to your lungs include:  Shortness of breath  Sudden onset of chest pain  Localized chest pain with coughing    You should tell your doctor immediately if you develop any of the above signs    Recovery at Home  If you are discharged directly home after your hospital stay, you will need some help at home anywhere from several days to several weeks after discharge. Before your surgery,  "arrange for a friend, family member, or caregiver to provide help at home.    Preparing Your Home  The following tips can help make your return home more comfortable, and can be addressed before your surgery:  If possible, rearrange furniture so you can maneuver with a cane, walker, or crutches. If you have stairs in your home, you may temporarily change rooms (make the living room your bedroom, for example) to minimize the use of stairs.  Place items you use frequently (phone, laptop, or tablet and ; remote control; glasses; pitcher and glass; reading material; and medications, for example) within easy reach so you do not have to reach up or bend down.    Prepare a \"recovery center\" by placing items that you use frequently within easy reach.     Remove any throw rugs or area rugs that could cause you to slip. Securely fasten electrical cords around the perimeter of the room.  If possible, get a good chair -- one that is firm and has a higher-than-average seat. This type of chair is safer and more comfortable than a low, soft-cushioned chair.  If possible, install a shower chair, gripping bar, and raised toilet seat in the bathroom.  Use assistive devices such as a long-handled shoehorn, a long-handled sponge, and a grabbing tool or reacher to avoid bending over too far.    Wound Care  During your recovery at home, follow these guidelines to take care of your wound and help prevent infection:  Keep the wound area clean and dry. The most common types of dressings being used today are applied sterilely in the operating room and are not removed for 7 to 10 days. Make sure you or your family member/caregiver ask for instructions on how to manage your dressing.  Most dressings are now waterproof and can be worn in the shower, but you should follow your doctor's instructions on how long to wait before you shower or bathe.  Notify your doctor immediately if the wound appears red or begins to drain. This could be a " sign of infection.    Swelling  Expect moderate to severe swelling in the first few weeks after surgery. You may also have mild to moderate swelling for 3 to 6 months after surgery. To reduce swelling, elevate your leg slightly and apply ice. Wearing compression stockings may also help reduce swelling. Notify your doctor if you experience new or severe swelling, since this may be the warning sign of a blood clot.    Medication  Take all medications as directed by your doctor. Home medications may include opioid and non-opioid pain pills, oral or injectable blood thinners, stool softeners, and anti-nausea medications.    Be sure to talk with your doctor about all your medications -- even over-the-counter drugs, supplements, and vitamins. Your doctor will tell you which over-the-counter medicines are safe to take while using prescription pain medication.    It is especially important to prevent any bacterial infections from developing in your artificial joint.  Some patients with special circumstances may be required to take antibiotics prior to dental work to help prevent infection.  Ask your doctor if you should take antibiotics before dental work. You may also wish to carry a medical alert card so that, if an emergency arises, medical personnel will know you have an artificial joint.    Diet  By the time you go home after surgery, you should be eating a normal diet.  Your doctor may recommend that you take iron and vitamin supplements. You also may be advised to avoid supplements that include vitamin K and foods rich in vitamin K if you are taking the blood thinner medication warfarin (Coumadin). Foods rich in vitamin K include broccoli, cauliflower, [place] sprouts, liver, green beans, garbanzo beans, lentils, soybeans, soybean oil, spinach, kale, lettuce, turnip greens, cabbage, and onions.  Continue to drink plenty of fluids and avoid alcohol.  Continue to watch your weight to avoid putting more stress on the  joint.    Resuming Normal Activities  Once you get home, you should stay active. The key is to not do too much, too soon. While you can expect some good days and some bad days, you should notice a gradual improvement over time. Generally, the following guidelines will apply:    Weightbearing  Follow your doctor's specific instructions about the use of a cane, walker, or crutches and when you can put weight on the leg. Full weightbearing may be allowed immediately or may be delayed by several weeks depending on the type of hip replacement you have undergone and your doctor's instructions.    Driving  In most cases, it is safe to resume driving when you are no longer taking opioid pain medication, and when your strength and reflexes have returned to a more normal state. Your doctor will help you determine when it is safe to resume driving.    Sexual Activity  Please consult your doctor about how soon you can safely resume sexual activity. Depending on your condition, you may be able to resume sexual activity within several weeks after surgery.    Sleeping Positions  Depending on your surgery, your doctor may ask you to avoid certain sleeping positions or to sleep with a pillow between your legs for a length of time. Ask your doctor which sleeping positions are safest and most appropriate for you.    Return to Work  Depending on the types of activities you do on the job and the speed of your recovery, it may take from several days to several weeks before you are able to return to work. Your doctor will advise you when it is safe to resume your normal work activities.    Sports and Exercise  Continue to do the exercises prescribed by your physical therapist for at least 2 months after surgery. In some cases, your doctor may recommend riding a stationary bicycle to help maintain muscle tone and keep your hip flexible. This can typically be done at physical therapy.     As soon as your doctor gives you the go-ahead, you  can return to many of the sports activities you enjoyed before your hip replacement:  Walk as much as you would like, but remember that walking is not a substitute for the exercises prescribed by your doctor and physical therapist.  Swimming is an excellent low-impact activity after a total hip replacement; you can begin swimming as soon as the wound is sufficiently healed. Your doctor will let you know when you can begin.   In general, lower impact fitness activities such as golfing, bicycling, and doubles tennis put less stress on your hip joint and are preferable to high-impact activities such as singles tennis, jogging, racquetball, basketball, and skiing.    Air Travel  Pressure changes and immobility may cause your operated leg to swell, especially if it is just healing. Ask your doctor before you travel on an airplane. When going through security, be aware that the sensitivity of metal detectors varies, and your artificial joint may cause an alarm. Tell the screener about your artificial joint before going through the metal detector.     Dos and Don'ts to Protect Your New Hip  Dos and don'ts (precautions) vary depending on your doctor's surgical technique and preferences. Your doctor and physical therapist will provide you with a list of dos and don'ts to remember with your new hip. These precautions will help to prevent the new joint from dislocating and ensure proper healing. Some of the most common precautions are listed below. Ask your doctor if these precautions apply to you.    The Don'ts POSTERIOR HIP PRECAUTIONS  Don't cross your legs at the knees for at least 6 to 8 weeks.  Don't bring your knee up higher than your hip.  Don't lean forward while sitting or as you sit down.  Don't try to  something on the floor while you are sitting.  Don't turn your feet excessively inward or outward when you bend down.  Don't reach down to pull up blankets when lying in bed.  Don't bend at the waist beyond  90°.    The Don'ts ANTERIOR HIP PRECAUTIONS  Don't twist and reach for something behind you.  Don't hyperextend and externally rotate the operative hip.  Don't apply any topicals to the incision.   Don't submerge or vigorously scrub incision in any water post-operatively until otherwise stated.     The Dos  Do keep the leg facing forward.  Do keep the affected leg in front as you sit or stand.  If you have been given posterior hip precautions after surgery, do use a high chair or barstool in the kitchen to help prevent bending your hip more than 90°.  Do kneel on the knee on the operated leg (the bad side).  Do use ice to reduce pain and swelling, but remember that ice will diminish sensation. Don't apply ice directly to the skin; use an ice pack or wrap it in a damp towel.  Do apply heat before exercising to assist with range of motion. Use a heating pad or hot, damp towel for 15 to 20 minutes.  Do cut back on your prescribed exercises if your muscles begin to ache, but don't stop doing the exercises.

## 2025-04-02 ENCOUNTER — APPOINTMENT (OUTPATIENT)
Dept: PHYSICAL THERAPY | Facility: CLINIC | Age: 55
End: 2025-04-02
Payer: COMMERCIAL

## 2025-04-09 ENCOUNTER — PREP FOR PROCEDURE (OUTPATIENT)
Age: 55
End: 2025-04-09

## 2025-04-09 ENCOUNTER — APPOINTMENT (OUTPATIENT)
Dept: PHYSICAL THERAPY | Facility: CLINIC | Age: 55
End: 2025-04-09
Payer: COMMERCIAL

## 2025-04-12 ENCOUNTER — ANESTHESIA (OUTPATIENT)
Dept: GASTROENTEROLOGY | Facility: HOSPITAL | Age: 55
End: 2025-04-12
Payer: COMMERCIAL

## 2025-04-12 ENCOUNTER — ANESTHESIA EVENT (OUTPATIENT)
Dept: GASTROENTEROLOGY | Facility: HOSPITAL | Age: 55
End: 2025-04-12
Payer: COMMERCIAL

## 2025-04-12 ENCOUNTER — HOSPITAL ENCOUNTER (OUTPATIENT)
Dept: GASTROENTEROLOGY | Facility: HOSPITAL | Age: 55
Setting detail: OUTPATIENT SURGERY
Discharge: HOME | End: 2025-04-12
Attending: INTERNAL MEDICINE
Payer: COMMERCIAL

## 2025-04-12 VITALS
RESPIRATION RATE: 22 BRPM | OXYGEN SATURATION: 96 % | BODY MASS INDEX: 35.83 KG/M2 | WEIGHT: 209.88 LBS | SYSTOLIC BLOOD PRESSURE: 145 MMHG | HEART RATE: 48 BPM | DIASTOLIC BLOOD PRESSURE: 71 MMHG | HEIGHT: 64 IN | TEMPERATURE: 97 F

## 2025-04-12 DIAGNOSIS — Z12.11 SCREENING FOR COLON CANCER: ICD-10-CM

## 2025-04-12 PROCEDURE — 88305 TISSUE EXAM BY PATHOLOGIST: CPT | Performed by: PATHOLOGY

## 2025-04-12 PROCEDURE — 45380 COLONOSCOPY AND BIOPSY: CPT | Performed by: INTERNAL MEDICINE

## 2025-04-12 RX ORDER — SODIUM CHLORIDE, SODIUM LACTATE, POTASSIUM CHLORIDE, CALCIUM CHLORIDE 600; 310; 30; 20 MG/100ML; MG/100ML; MG/100ML; MG/100ML
75 INJECTION, SOLUTION INTRAVENOUS CONTINUOUS
Status: DISCONTINUED | OUTPATIENT
Start: 2025-04-12 | End: 2025-04-16 | Stop reason: HOSPADM

## 2025-04-12 RX ORDER — PROPOFOL 10 MG/ML
INJECTION, EMULSION INTRAVENOUS AS NEEDED
Status: DISCONTINUED | OUTPATIENT
Start: 2025-04-12 | End: 2025-04-12

## 2025-04-12 RX ADMIN — SODIUM CHLORIDE, SODIUM LACTATE, POTASSIUM CHLORIDE, AND CALCIUM CHLORIDE 75 ML/HR: .6; .31; .03; .02 INJECTION, SOLUTION INTRAVENOUS at 09:45

## 2025-04-12 RX ADMIN — PROPOFOL 50 MG: 10 INJECTION, EMULSION INTRAVENOUS at 10:22

## 2025-04-12 RX ADMIN — PROPOFOL 20 MG: 10 INJECTION, EMULSION INTRAVENOUS at 10:18

## 2025-04-12 RX ADMIN — PROPOFOL 30 MG: 10 INJECTION, EMULSION INTRAVENOUS at 10:20

## 2025-04-12 RX ADMIN — PROPOFOL 100 MG: 10 INJECTION, EMULSION INTRAVENOUS at 10:17

## 2025-04-12 NOTE — ANESTHESIA PREPROCEDURE EVALUATION
Procedure:  COLONOSCOPY    Relevant Problems   CARDIO   (+) Essential (primary) hypertension   (+) Familial hypercholesterolemia   (+) Migraine without aura and without status migrainosus, not intractable   (+) Spider veins of both lower extremities      NEURO/PSYCH   (+) Migraine without aura and without status migrainosus, not intractable        Physical Exam    Airway    Mallampati score: III  TM Distance: >3 FB  Neck ROM: full     Dental   No notable dental hx     Cardiovascular  Cardiovascular exam normal    Pulmonary  Pulmonary exam normal     Other Findings  post-pubertal.      Anesthesia Plan  ASA Score- 2     Anesthesia Type- IV sedation with anesthesia with ASA Monitors.         Additional Monitors:     Airway Plan:            Plan Factors-Exercise tolerance (METS): >4 METS.    Chart reviewed.    Patient summary reviewed.    Patient is not a current smoker.              Induction- intravenous.    Postoperative Plan-         Informed Consent- Anesthetic plan and risks discussed with patient.        NPO Status:  No vitals data found for the desired time range.

## 2025-04-12 NOTE — INTERVAL H&P NOTE
H&P reviewed. After examining the patient I find no changes in the patients condition since the H&P had been written.    Vitals:    04/12/25 0930   BP: 125/85   Pulse: 56   Resp: 18   Temp: 97.8 °F (36.6 °C)   SpO2: 100%

## 2025-04-12 NOTE — H&P
History and Physical - [facility]  Shelia Valdez 55 y.o. female MRN: [id number]                  HPI: Shelia Valdez is a 55 y.o. year old female who presents for colonoscopy for history of colon polyps with atypia.  Last colonoscopy 5 years ago      REVIEW OF SYSTEMS: Per the HPI, and otherwise unremarkable.    Historical Information   Past Medical History:   Diagnosis Date    Asthma N/a    Fatigue     Hypertension     Migraine     Proteinuria      Past Surgical History:   Procedure Laterality Date    BREAST BIOPSY Left 01/01/2015    bengin    ENDOMETRIAL BIOPSY  02/26/2018    FOOT SURGERY Bilateral     reconstructive    HYSTERECTOMY  09/04/2018    TONSILLECTOMY      TUBAL LIGATION       Social History   Social History     Substance and Sexual Activity   Alcohol Use Never     Social History     Substance and Sexual Activity   Drug Use Never     Social History     Tobacco Use   Smoking Status Never    Passive exposure: Never   Smokeless Tobacco Never     Family History   Problem Relation Age of Onset    Stroke Mother     Diabetes Mother     Hypertension Mother     Kidney failure Mother     Dialysis Mother     Migraines Mother     Lung cancer Father     No Known Problems Daughter     Lung cancer Maternal Grandmother     No Known Problems Paternal Grandmother     No Known Problems Maternal Aunt     Breast cancer Half-Sister 39    No Known Problems Half-Sister     No Known Problems Half-Sister     Breast cancer Half-Sister 52    No Known Problems Half-Sister     No Known Problems Half-Sister     No Known Problems Half-Sister        Meds/Allergies     Not in a hospital admission.    Allergies   Allergen Reactions    Amoxicillin Hives and Shortness Of Breath     Other reaction(s): Unknown Reaction    Ciprofloxacin Hives and Shortness Of Breath     Other reaction(s): Unknown Reaction    Hydrocodone-Acetaminophen Hives and Shortness Of Breath    Influenza Vaccine Live Hives     Lisinopril Anaphylaxis and Angioedema     Other reaction(s): Unknown Reaction  Other reaction(s): Angioedema    Nitrofurantoin Hives and Shortness Of Breath     Other reaction(s): Unknown Reaction    Sulfa Antibiotics Hives, Shortness Of Breath and Anaphylaxis    Sulfamethoxazole-Trimethoprim Anaphylaxis     Other reaction(s): Unknown Reaction    Influenza Vaccines Other (See Comments)     hives    Oxycodone     Quinolones      Other reaction(s): Unknown Reaction  Other reaction(s): Unknown Reaction  Other reaction(s): Unknown Reaction    Amlodipine Rash     Other reaction(s): Unknown Reaction    Other Hives, Rash and Fever     estrogen patch       Objective     There were no vitals taken for this visit.      PHYSICAL EXAM    Gen: NAD  CV: RRR  CHEST: Clear  ABD: soft, NT/ND  EXT: no edema  Neuro: AAO      ASSESSMENT/PLAN:  This is a 55 y.o. year old female here for history of colon polyps    PLAN:   Procedure: Colonoscopy

## 2025-04-12 NOTE — ANESTHESIA POSTPROCEDURE EVALUATION
Post-Op Assessment Note    CV Status:  Stable    Pain management: adequate       Mental Status:  Sleepy and arousable   Hydration Status:  Euvolemic   PONV Controlled:  Controlled   Airway Patency:  Patent  Two or more mitigation strategies used for obstructive sleep apnea   Post Op Vitals Reviewed: Yes    No anethesia notable event occurred.    Staff: Anesthesiologist, CRNA           Last Filed PACU Vitals:  Vitals Value Taken Time   Temp 97 °F (36.1 °C) 04/12/25 1027   Pulse 56 04/12/25 1027   /66 04/12/25 1027   Resp 17 04/12/25 1027   SpO2 97 % 04/12/25 1027       Modified Marco:     Vitals Value Taken Time   Activity 2 04/12/25 1027   Respiration 2 04/12/25 1027   Circulation 2 04/12/25 1027   Consciousness 1 04/12/25 1027   Oxygen Saturation 2 04/12/25 1027     Modified Marco Score: 9

## 2025-04-16 PROCEDURE — 88305 TISSUE EXAM BY PATHOLOGIST: CPT | Performed by: PATHOLOGY

## 2025-04-17 ENCOUNTER — TELEPHONE (OUTPATIENT)
Dept: OBGYN CLINIC | Facility: HOSPITAL | Age: 55
End: 2025-04-17

## 2025-04-17 LAB
DME PARACHUTE DELIVERY DATE REQUESTED: NORMAL
DME PARACHUTE ITEM DESCRIPTION: NORMAL
DME PARACHUTE ITEM DESCRIPTION: NORMAL
DME PARACHUTE ORDER STATUS: NORMAL
DME PARACHUTE SUPPLIER NAME: NORMAL
DME PARACHUTE SUPPLIER PHONE: NORMAL

## 2025-04-17 NOTE — TELEPHONE ENCOUNTER
"Preoperative Elective Admission Assessment    EKG/LAB/MRSA SWAB/CXR date: 4/19-4/21, no type&screen ordered    Living Situation:    Who does pt live with: spouse and younger son  What kind of home:  Bi-level home  How do they enter the home:  Driveway or small grass hill (to avoid steps)  How many levels in home: 2   # of steps to enter home: Driveway: 5 steps or small grass hill: 0 steps  # of steps to second floor: 12-14 (from ground level)  Are there handrails: Yes, both sides  Are there landings: Yes  Sleeping arrangement: second floor  Where is Bathroom: second floor   Where is the tub or shower: second floor, walk in shower   Toilet height? Concerns for low toilets: \"Standard low toilets\" - Ordering BSC     First Floor Setup:   Is there a bathroom:  No - Ordering BSC but pt plans to navigate steps to second floor bed/bath layout  Where would pt sleep:  Pt plans to navigate steps to second floor bed/bath layout     DME: DME ordered, BSC and RW 4/17/25.  Instructed to bring RW on DOS.    We discussed clearing pathways in the home and making sure there is accessibly to use the walker, for example, removing throw rugs.      Patient's Current Level of Function: Ambulates: Independently and ADLs: Independent    Post-op Caregiver: spouse. Per pt, spouse will be taking a few days off after surgery. Younger son is also in home, able to help.   Caregiver Name and phone number for Inpatient discharge needs: Anastacio Valdez (Spouse)  [phone] (Home Phone)   Currently receive any HHC/aides/community supports: No     Post-op Transport: spouse  To/from hospital: spouse  To/from PT 2-3x/week: spouse  Uses community transport now: No     Outpatient Physical Therapy Site:  Site: [place]   pre and post-op appts scheduled? Yes     Medication Management: self  Preferred Pharmacy for Post-op Medications: [id number] [place] - [address] [[id number]]   Blood Management Vitamin Regimen: Pt confirms she has " (6) preop vitamins at home and will begin 30 days prior.   Post-op anticoagulant: to be determined by surgical team postoperatively  Has Bactroban for 5 days preop: Yes  Educated on Preoperative Bathing Instructions, and use of Soap for 5 days before surgery.      DC Plan: Pt plans to be discharged home    Barriers to DC identified preoperatively: none identified    BMI: 36.03    Patient Education:  Pt educated on post-op pain, early mobilization (POD0), LOS goals, OP PT goals, and preoperative bathing. Patient educated that our goal is to appropriately discharge patient based off their post-op function while striving to maintain maximal independence. The goal is to discharge patient to home and for them to attend outpatient physical therapy.    Assigned to care team? Yes   none

## 2025-04-22 ENCOUNTER — PATIENT MESSAGE (OUTPATIENT)
Dept: OBGYN CLINIC | Facility: CLINIC | Age: 55
End: 2025-04-22

## 2025-04-22 PROBLEM — M16.12 PRIMARY OSTEOARTHRITIS OF LEFT HIP: Status: ACTIVE | Noted: 2025-04-22

## 2025-04-22 PROBLEM — E66.09 CLASS 1 OBESITY DUE TO EXCESS CALORIES WITHOUT SERIOUS COMORBIDITY WITH BODY MASS INDEX (BMI) OF 33.0 TO 33.9 IN ADULT: Status: RESOLVED | Noted: 2022-09-30 | Resolved: 2025-04-22

## 2025-04-22 PROBLEM — E66.9 OBESITY (BMI 30-39.9): Status: ACTIVE | Noted: 2025-04-22

## 2025-04-22 PROBLEM — E66.811 CLASS 1 OBESITY DUE TO EXCESS CALORIES WITHOUT SERIOUS COMORBIDITY WITH BODY MASS INDEX (BMI) OF 33.0 TO 33.9 IN ADULT: Status: RESOLVED | Noted: 2022-09-30 | Resolved: 2025-04-22

## 2025-04-22 PROBLEM — R22.0 SWELLING, MASS, OR LUMP ON FACE: Status: RESOLVED | Noted: 2023-01-27 | Resolved: 2025-04-22

## 2025-04-22 NOTE — PROGRESS NOTES
Internal Medicine Pre-Operative Evaluation:     Reason for Visit: Pre-operative Evaluation for Risk Stratification and Optimization    Patient ID: Shelia Valdez is a 55 y.o. female.     Case: [id number] Date/Time: 05/21/25 0830   Procedure: ARTHROPLASTY HIP TOTAL ANTERIOR (Left: Hip)   Anesthesia type: Spinal   Diagnosis: Osteoarthritis of left hip, unspecified osteoarthritis type [M16.12]   Pre-op diagnosis: Osteoarthritis of left hip, unspecified osteoarthritis type [M16.12]   Location: [place] OR ROOM  / [facility]   Surgeons: Jamison Hung MD         Recommendations to Proceed withSurgery    Patient is considered to be Low risk for Medium risk procedure.     After evaluation and discussion with patient with emphasis that all surgery has some degree of inherent risk it is acknowledged by patient this risk is Acceptable.    Patient is optimized and may proceed with planned procedure.     Assessment    Pre-operative Medical Evaluation for planned surgery  Recommendations as listed in PLAN section below  Contact surgical nurse  navigator with any questions regarding preoperative plan or schedule.      Assessment & Plan  Preoperative clearance    Primary osteoarthritis of left hip  Failed outpatient conservative measures  Electing to undergo surgical procedure as stated above    Essential (primary) hypertension  Stable   Refer to PAT instructions regarding medication administration the morning of surgery    Obesity (BMI 30-39.9)  Recommend ongoing attempts at weight loss  Current BMI meets criteria of <40 per MLJ preoperative qualifications             Plan:     1. Further preoperative workup as follows:   - none no further testing required may proceed with surgery    2. Preoperative Medication Management Review performed by PAT nursing  YES    3. Patient requires further consultation with:   No Consults Required    4. Discharge Planning / Barriers to Discharge  none identified -  patients has post discharge therapy plan in place, transportation arranged for discharge day, adequate family support at home to assist with discharge to home.        Subjective:           History of Present Illness:     Shelia Valdez is a 55 y.o. female who presents to the office today for a preoperative consultation at the request of surgeon. The patient understands this is an elective procedure and not emergent. They are electing to undergo planned procedure with an understanding that all surgery has inherent risk. They have worked with their surgeon and failed conservative treatment measures. Today they present for preoperative risk assessment and recommendations for optimization in preparation for surgery.    Pt seen in center for perioperative medicine for upcoming proposed surgery. They have failed previous conservative measures and have elected surgical intervention.     Pt meets presurgical lab and BMI optimization goals.      Shelia Valdez has an IN HOSPITAL cardiac risk of RCI RISK CLASS I (0 risk factors, risk of major cardiac compl. appr. 0.5%) based on RCRI calculator    Cardiac Risk Estimation: per the Revised Cardiac Risk Index (Circ. 100:1043, 1999),         Pre-op Exam    Previous history of bleeding disorders or clots?: No  Previous Anesthesia reaction?: No  Prolonged steroid use in the last 6 months?: No    Assessment of Cardiac Risk:   - Unstable or severe angina or MI in the last 6 weeks or history of stent placement in the last year?: No   - Decompensated heart failure (e.g. New onset heart failure, NYHA  Class IV heart failure, or worsening existing heart failure)?: No  - Significant arrhythmias such as high grade AV block, symptomatic ventricular arrhythmia, newly recognized ventricular tachycardia, supraventricular tachycardia with resting heart rate >100, or symptomatic bradycardia?: No  - Severe heart valve disease including aortic stenosis or symptomatic mitral  "stenosis?: No      Pre-operative Risk Factors:  Elevated-risk surgery: No    History of cerebrovascular disease: No    History of ischemic heart disease: No  Pre-operative treatment with insulin: No  Pre-operative creatinine >2 mg/dL: No    History of congestive heart failure: No        ROS: No TIA's or unusual headaches, no dysphagia.  No prolonged cough. No dyspnea or chest pain on exertion.  No abdominal pain, change in bowel habits, black or bloody stools.  No urinary tract or BPH symptoms.  Positive reported pain in arthritic joint. Positive difficulty with gait. No skin rashes or issues.      Objective:    /88 (BP Location: Right arm, Patient Position: Sitting, Cuff Size: Standard)   Pulse 60   Ht 5' 4\" (1.626 m)   Wt 95.3 kg (210 lb)   SpO2 96%   BMI 36.05 kg/m²       General Appearance: no distress, conversive  HEENT: PERRLA, conjuctiva normal; oropharynx clear; mucous membranes moist;   Neck:  Supple, no lymphadenopathy or thyromegaly  Lungs: breath sounds normal, normal respiratory effort, no retractions, expiratory effort normal  CV: normal heart sounds S1/S2, PMI normal   ABD: soft non tender, no masses , no hepatic or splenomegaly  EXT: DP pulses intact, no lymphadenopathy, no edema  Skin: normal turgor, normal texture, no rash  Psych: affect normal, mood normal  Neuro: AAOx3        The following portions of the patient's history were reviewed and updated as appropriate: allergies, current medications, past family history, past medical history, past social history, past surgical history and problem list.     Past History:       Past Medical History:   Diagnosis Date    Asthma N/a    Fatigue     Hypertension     Migraine     Proteinuria     Past Surgical History:   Procedure Laterality Date    BREAST BIOPSY Left 01/01/2015    bengin    ENDOMETRIAL BIOPSY  02/26/2018    FOOT SURGERY Bilateral     reconstructive    HYSTERECTOMY  09/04/2018    TONSILLECTOMY      TUBAL LIGATION            Social " History     Tobacco Use    Smoking status: Never     Passive exposure: Never    Smokeless tobacco: Never   Vaping Use    Vaping status: Never Used   Substance Use Topics    Alcohol use: Never    Drug use: Never     Family History   Problem Relation Age of Onset    Stroke Mother     Diabetes Mother     Hypertension Mother     Kidney failure Mother     Dialysis Mother     Migraines Mother     Lung cancer Father     No Known Problems Daughter     Lung cancer Maternal Grandmother     No Known Problems Paternal Grandmother     No Known Problems Maternal Aunt     Breast cancer Half-Sister 39    No Known Problems Half-Sister     No Known Problems Half-Sister     Breast cancer Half-Sister 52    No Known Problems Half-Sister     No Known Problems Half-Sister     No Known Problems Half-Sister           Allergies:     Allergies   Allergen Reactions    Amoxicillin Hives and Shortness Of Breath     Other reaction(s): Unknown Reaction    Ciprofloxacin Hives and Shortness Of Breath     Other reaction(s): Unknown Reaction    Hydrocodone-Acetaminophen Hives and Shortness Of Breath    Influenza Vaccine Live Hives    Lisinopril Anaphylaxis and Angioedema     Other reaction(s): Unknown Reaction  Other reaction(s): Angioedema    Nitrofurantoin Hives and Shortness Of Breath     Other reaction(s): Unknown Reaction    Sulfa Antibiotics Hives, Shortness Of Breath and Anaphylaxis    Sulfamethoxazole-Trimethoprim Anaphylaxis     Other reaction(s): Unknown Reaction    Influenza Vaccines Other (See Comments)     hives    Oxycodone     Quinolones      Other reaction(s): Unknown Reaction  Other reaction(s): Unknown Reaction  Other reaction(s): Unknown Reaction    Amlodipine Rash     Other reaction(s): Unknown Reaction    Other Hives, Rash and Fever     estrogen patch        Current Medications:     Current Outpatient Medications   Medication Instructions    ascorbic acid (VITAMIN C) 500 mg, Oral, 2 times daily    cholecalciferol (VITAMIN D3)  "1,000 Units, Oral, Daily    cyclobenzaprine (FLEXERIL) 5 mg, Oral, 3 times daily PRN    Diclofenac Sodium (VOLTAREN) 2 g, Topical, 4 times daily    ferrous sulfate 324 mg, Oral, Daily before breakfast    folic acid (FOLVITE) 1 mg, Oral, Daily    hydrALAZINE (APRESOLINE) 25 mg, Oral, 2 times daily    Multiple Vitamins-Minerals (multivitamin with minerals) tablet 1 tablet, Oral, Daily    mupirocin (BACTROBAN) 2 % ointment Apply topically to the inside of the left and right nostrils twice daily for 5 days before surgery, including the morning of surgery.    naproxen (NAPROSYN) 500 mg, Oral, 2 times daily PRN    nebivolol (BYSTOLIC) 20 mg, Oral, Daily    rimegepant sulfate (NURTEC) 75 mg, Oral, Every other day    SUMAtriptan (IMITREX) 100 mg, Oral, Once as needed    zinc sulfate (ZINCATE) 220 mg, Oral, Daily           PRE-OP WORKSHEET DATA    Assessment of Pre-Operative Risks     MLJ Quality Hard Stops:    BMI (<40) : Estimated body mass index is 36.05 kg/m² as calculated from the following:    Height as of this encounter: 5' 4\" (1.626 m).    Weight as of this encounter: 95.3 kg (210 lb).    Hgb ( >11):   Lab Results   Component Value Date    HGB 12.6 04/26/2025    HGB 13.1 09/07/2024    HGB 13.2 10/14/2023       HbA1c (<7.5) :   Lab Results   Component Value Date    HGBA1C 5.7 (H) 04/26/2025       GFR (>60) (Less then 45 = Nephrology consult):    Lab Results   Component Value Date    EGFR 88 04/26/2025    EGFR 99 09/07/2024    EGFR 91 10/14/2023            Pre-Op Data Reviewed:       Laboratory Results: I have personally reviewed the pertinent reports    EKG: I personally reviewed and interpreted available tracings in the electronic medical record    Encounter Date: 04/26/25   ECG 12 lead   Result Value    Ventricular Rate 57    Atrial Rate 57    MS Interval 154    QRSD Interval 80    QT Interval 434    QTC Interval 423    P Axis 30    QRS Axis -5    T Wave Axis 36    Narrative    Sinus bradycardia    Confirmed by " Kat Ferreira ([id number]) on 4/28/2025 8:35:31 PM       OLD RECORDS: reviewed old records in the chart review section if EHR on day of visit.    Previous cardiopulmonary studies within the past year:  Echocardiogram: no   Cardiac Catheterization: no  Stress Test: no      Time of visit including pre-visit chart review, visit and post-visit coordination of plan and care , review of pre-surgical lab work, preparation and time spent documenting note in electronic medical record, time spent face-to-face in physical examination answering patient questions by care team 45 minutes             [facility]

## 2025-04-22 NOTE — PATIENT INSTRUCTIONS
BEFORE SURGERY    Contact your surgical nurse navigator or surgical provider with any questions regarding preoperative plan or schedule.  Stop all over the counter supplements, herbal, naturopathic  medications for 1 week prior to surgery UNLESS prescribed by your surgeon  Hold NSAIDS (i.e. advil, alleve, motrin, ibuprofen, celebrex) minimum 5 days prior to surgery  Follow presurgical medication instructions provided by preadmission nursing team reviewed during your presurgery phone call  Strategies for optimizing your surgery through breathing exercises, nutrition and physical activity can be found at [email or web address]  Call [phone] with any presurgical concerns or medications questions or use the messaging feature in your [employer] sofya to contact your provider    AFTER SURGERY    Recommend using Tylenol ( acetaminophen ) 1000 mg every eight hours during the first week post discharge along with icing the area for 20 mins every 3-4 hours while awake can be helpful in reducing your need for post operative opioid use. This opioid sparing plan can be used along side your surgeons pain plan.  Use stool softener over the counter (colace) daily after surgery during the first 1-2 weeks to avoid post operative constipation issues  If no bowel movement within 3 days after surgery then use over the counter Miralax in addition to your stool softener   If cleared by your surgical team for activity then early and often walking is encouraged and can be important in prevention of post surgical blood clots. Additionally spend as much time out of bed as possible and allowed by your surgical team  Use your incentive spirometer twice per hour in the first seven days after surgery to help prevent post surgery lung complications and infections  It is very important you follow the instructions from your surgeon regarding any medications for after surgery blood clot prevention. Compliance with these medications or interventions is very  important.  Call [phone] with any post discharge concerns or medical issues or use the messaging feature in your [employer] sofya to contact your provider

## 2025-04-26 ENCOUNTER — APPOINTMENT (OUTPATIENT)
Dept: LAB | Facility: HOSPITAL | Age: 55
End: 2025-04-26
Payer: COMMERCIAL

## 2025-04-26 ENCOUNTER — HOSPITAL ENCOUNTER (OUTPATIENT)
Dept: RADIOLOGY | Facility: HOSPITAL | Age: 55
Discharge: HOME | End: 2025-04-26
Payer: COMMERCIAL

## 2025-04-26 ENCOUNTER — OFFICE VISIT (OUTPATIENT)
Dept: LAB | Facility: HOSPITAL | Age: 55
End: 2025-04-26
Payer: COMMERCIAL

## 2025-04-26 DIAGNOSIS — M16.12 OSTEOARTHRITIS OF LEFT HIP, UNSPECIFIED OSTEOARTHRITIS TYPE: ICD-10-CM

## 2025-04-26 DIAGNOSIS — Z01.818 PREOP TESTING: ICD-10-CM

## 2025-04-26 LAB
ALBUMIN SERPL BCG-MCNC: 3.9 G/DL (ref 3.5–5)
ALP SERPL-CCNC: 80 U/L (ref 34–104)
ALT SERPL W P-5'-P-CCNC: 16 U/L (ref 7–52)
ANION GAP SERPL CALCULATED.3IONS-SCNC: 5 MMOL/L (ref 4–13)
APTT PPP: 26 SECONDS (ref 23–34)
AST SERPL W P-5'-P-CCNC: 16 U/L (ref 13–39)
BASOPHILS # BLD AUTO: 0.02 THOUSANDS/ÂΜL (ref 0–0.1)
BASOPHILS NFR BLD AUTO: 0 % (ref 0–1)
BILIRUB SERPL-MCNC: 0.64 MG/DL (ref 0.2–1)
BUN SERPL-MCNC: 13 MG/DL (ref 5–25)
CALCIUM SERPL-MCNC: 10 MG/DL (ref 8.4–10.2)
CHLORIDE SERPL-SCNC: 104 MMOL/L (ref 96–108)
CO2 SERPL-SCNC: 29 MMOL/L (ref 21–32)
CREAT SERPL-MCNC: 0.75 MG/DL (ref 0.6–1.3)
EOSINOPHIL # BLD AUTO: 0.1 THOUSAND/ÂΜL (ref 0–0.61)
EOSINOPHIL NFR BLD AUTO: 2 % (ref 0–6)
ERYTHROCYTE [DISTWIDTH] IN BLOOD BY AUTOMATED COUNT: 12.1 % (ref 11.6–15.1)
EST. AVERAGE GLUCOSE BLD GHB EST-MCNC: 117 MG/DL
GFR SERPL CREATININE-BSD FRML MDRD: 88 ML/MIN/1.73SQ M
GLUCOSE P FAST SERPL-MCNC: 102 MG/DL (ref 65–99)
HBA1C MFR BLD: 5.7 %
HCT VFR BLD AUTO: 37.5 % (ref 34.8–46.1)
HGB BLD-MCNC: 12.6 G/DL (ref 11.5–15.4)
IMM GRANULOCYTES # BLD AUTO: 0.01 THOUSAND/UL (ref 0–0.2)
IMM GRANULOCYTES NFR BLD AUTO: 0 % (ref 0–2)
INR PPP: 0.88 (ref 0.85–1.19)
LYMPHOCYTES # BLD AUTO: 1.98 THOUSANDS/ÂΜL (ref 0.6–4.47)
LYMPHOCYTES NFR BLD AUTO: 37 % (ref 14–44)
MCH RBC QN AUTO: 29.5 PG (ref 26.8–34.3)
MCHC RBC AUTO-ENTMCNC: 33.6 G/DL (ref 31.4–37.4)
MCV RBC AUTO: 88 FL (ref 82–98)
MONOCYTES # BLD AUTO: 0.54 THOUSAND/ÂΜL (ref 0.17–1.22)
MONOCYTES NFR BLD AUTO: 10 % (ref 4–12)
NEUTROPHILS # BLD AUTO: 2.77 THOUSANDS/ÂΜL (ref 1.85–7.62)
NEUTS SEG NFR BLD AUTO: 51 % (ref 43–75)
NRBC BLD AUTO-RTO: 0 /100 WBCS
PLATELET # BLD AUTO: 167 THOUSANDS/UL (ref 149–390)
PMV BLD AUTO: 11.5 FL (ref 8.9–12.7)
POTASSIUM SERPL-SCNC: 4.1 MMOL/L (ref 3.5–5.3)
PROT SERPL-MCNC: 6.9 G/DL (ref 6.4–8.4)
PROTHROMBIN TIME: 12.6 SECONDS (ref 12.3–15)
RBC # BLD AUTO: 4.27 MILLION/UL (ref 3.81–5.12)
RETICS # AUTO: NORMAL 10*3/UL (ref 14097–95744)
RETICS # CALC: 1.69 % (ref 0.37–1.87)
SODIUM SERPL-SCNC: 138 MMOL/L (ref 135–147)
WBC # BLD AUTO: 5.42 THOUSAND/UL (ref 4.31–10.16)

## 2025-04-26 PROCEDURE — 85730 THROMBOPLASTIN TIME PARTIAL: CPT

## 2025-04-26 PROCEDURE — 85025 COMPLETE CBC W/AUTO DIFF WBC: CPT

## 2025-04-26 PROCEDURE — 83550 IRON BINDING TEST: CPT

## 2025-04-26 PROCEDURE — 85610 PROTHROMBIN TIME: CPT

## 2025-04-26 PROCEDURE — 71046 X-RAY EXAM CHEST 2 VIEWS: CPT

## 2025-04-26 PROCEDURE — 80053 COMPREHEN METABOLIC PANEL: CPT

## 2025-04-26 PROCEDURE — 82728 ASSAY OF FERRITIN: CPT

## 2025-04-26 PROCEDURE — 83540 ASSAY OF IRON: CPT

## 2025-04-26 PROCEDURE — 83036 HEMOGLOBIN GLYCOSYLATED A1C: CPT

## 2025-04-26 PROCEDURE — 85045 AUTOMATED RETICULOCYTE COUNT: CPT

## 2025-04-26 PROCEDURE — 36415 COLL VENOUS BLD VENIPUNCTURE: CPT

## 2025-04-26 PROCEDURE — 87081 CULTURE SCREEN ONLY: CPT

## 2025-04-26 PROCEDURE — 93005 ELECTROCARDIOGRAM TRACING: CPT

## 2025-04-27 LAB
FERRITIN SERPL-MCNC: 48 NG/ML (ref 30–307)
IRON SATN MFR SERPL: 34 % (ref 15–50)
IRON SERPL-MCNC: 106 UG/DL (ref 50–212)
MRSA NOSE QL CULT: NORMAL
TIBC SERPL-MCNC: 313.6 UG/DL (ref 250–450)
TRANSFERRIN SERPL-MCNC: 224 MG/DL (ref 203–362)
UIBC SERPL-MCNC: 208 UG/DL (ref 155–355)

## 2025-04-28 DIAGNOSIS — M16.12 OSTEOARTHRITIS OF LEFT HIP, UNSPECIFIED OSTEOARTHRITIS TYPE: ICD-10-CM

## 2025-04-28 LAB
ATRIAL RATE: 57 BPM
P AXIS: 30 DEGREES
PR INTERVAL: 154 MS
QRS AXIS: -5 DEGREES
QRSD INTERVAL: 80 MS
QT INTERVAL: 434 MS
QTC INTERVAL: 423 MS
T WAVE AXIS: 36 DEGREES
VENTRICULAR RATE: 57 BPM

## 2025-04-28 PROCEDURE — 93010 ELECTROCARDIOGRAM REPORT: CPT | Performed by: INTERNAL MEDICINE

## 2025-04-28 RX ORDER — FOLIC ACID 1 MG/1
1 TABLET ORAL DAILY
Qty: 90 TABLET | Refills: 0 | Status: SHIPPED | OUTPATIENT
Start: 2025-04-28

## 2025-04-28 RX ORDER — FERROUS SULFATE 324(65)MG
324 TABLET, DELAYED RELEASE (ENTERIC COATED) ORAL
Qty: 90 TABLET | Refills: 0 | Status: SHIPPED | OUTPATIENT
Start: 2025-04-28

## 2025-04-28 RX ORDER — VITAMIN B COMPLEX
1000 TABLET ORAL DAILY
Qty: 90 TABLET | Refills: 0 | Status: SHIPPED | OUTPATIENT
Start: 2025-04-28

## 2025-04-30 ENCOUNTER — OFFICE VISIT (OUTPATIENT)
Age: 55
End: 2025-04-30
Payer: COMMERCIAL

## 2025-04-30 VITALS
BODY MASS INDEX: 35.85 KG/M2 | WEIGHT: 210 LBS | HEART RATE: 60 BPM | HEIGHT: 64 IN | DIASTOLIC BLOOD PRESSURE: 88 MMHG | SYSTOLIC BLOOD PRESSURE: 146 MMHG | OXYGEN SATURATION: 96 %

## 2025-04-30 DIAGNOSIS — E66.9 OBESITY (BMI 30-39.9): ICD-10-CM

## 2025-04-30 DIAGNOSIS — Z01.818 PREOPERATIVE CLEARANCE: Primary | ICD-10-CM

## 2025-04-30 DIAGNOSIS — M16.12 OSTEOARTHRITIS OF LEFT HIP, UNSPECIFIED OSTEOARTHRITIS TYPE: ICD-10-CM

## 2025-04-30 DIAGNOSIS — I10 ESSENTIAL (PRIMARY) HYPERTENSION: ICD-10-CM

## 2025-04-30 DIAGNOSIS — M16.12 PRIMARY OSTEOARTHRITIS OF LEFT HIP: ICD-10-CM

## 2025-04-30 PROCEDURE — 99214 OFFICE O/P EST MOD 30 MIN: CPT | Performed by: NURSE PRACTITIONER

## 2025-05-05 NOTE — PRE-PROCEDURE INSTRUCTIONS
Pre-Surgery Instructions:   Medication Instructions    ascorbic acid (VITAMIN C) 500 MG tablet Hold day of surgery.    cholecalciferol (VITAMIN D3) 25 mcg (1,000 units) tablet Hold day of surgery.    Diclofenac Sodium (VOLTAREN) 1 % Uses PRN- DO NOT take day of surgery    ferrous sulfate 324 (65 Fe) mg Hold day of surgery.    folic acid (FOLVITE) 1 mg tablet Hold day of surgery.    hydrALAZINE (APRESOLINE) 25 mg tablet Take day of surgery.    Multiple Vitamins-Minerals (multivitamin with minerals) tablet Hold day of surgery.    mupirocin (BACTROBAN) 2 % ointment Instructions provided by MD    nebivolol (BYSTOLIC) 20 MG tablet Take day of surgery.    rimegepant sulfate (NURTEC) 75 mg TBDP Take day of surgery.    SUMAtriptan (IMITREX) 100 mg tablet Uses PRN- OK to take day of surgery    zinc sulfate (ZINCATE) 220 mg capsule Hold day of surgery.     Medication instructions for day of surgery reviewed. Please take all instructed medications with only a sip of water.       You will receive a call one business day prior to surgery with an arrival time and hospital directions. If your surgery is scheduled on a Monday, the hospital will be calling you on the Friday prior to your surgery. If you have not heard from anyone by 8pm, please call the hospital supervisor through the hospital  at [phone]. ([place] [phone] or [place] [phone]).    Do not eat or drink anything after midnight the night before your surgery, including candy, mints, lifesavers, or chewing gum. Do not drink alcohol 24hrs before your surgery. Try not to smoke at least 24hrs before your surgery.       Follow the pre surgery showering instructions as listed in the “My Surgical Experience Booklet” or otherwise provided by your surgeon's office. Do not use a blade to shave the surgical area 1 week before surgery. It is okay to use a clean electric clippers up to 24 hours before surgery. Do not apply any lotions, creams, including  makeup, cologne, deodorant, or perfumes after showering on the day of your surgery. Do not use dry shampoo, hair spray, hair gel, or any type of hair products.     No contact lenses, eye make-up, or artificial eyelashes. Remove nail polish, including gel polish, and any artificial, gel, or acrylic nails if possible. Remove all jewelry including rings and body piercing jewelry.     Wear causal clothing that is easy to take on and off. Consider your type of surgery.    Keep any valuables, jewelry, piercings at home. Please bring any specially ordered equipment (sling, braces) if indicated.    Arrange for a responsible person to drive you to and from the hospital on the day of your surgery. Please confirm the visitor policy for the day of your procedure when you receive your phone call with an arrival time.     Call the surgeon's office with any new illnesses, exposures, or additional questions prior to surgery.    Please reference your “My Surgical Experience Booklet” for additional information to prepare for your upcoming surgery.

## 2025-05-06 DIAGNOSIS — M16.12 OSTEOARTHRITIS OF LEFT HIP, UNSPECIFIED OSTEOARTHRITIS TYPE: Primary | ICD-10-CM

## 2025-05-12 ENCOUNTER — TELEPHONE (OUTPATIENT)
Dept: OBGYN CLINIC | Facility: CLINIC | Age: 55
End: 2025-05-12

## 2025-05-12 ENCOUNTER — TELEPHONE (OUTPATIENT)
Age: 55
End: 2025-05-12

## 2025-05-12 NOTE — TELEPHONE ENCOUNTER
DME issue discussed with Areli and Nicole via Teams.     Attempted to call pt w/ update. No answer and VM box is not set up, unable to leave message. NN will re-attempt and/or send My Chart message if unable to reach patient.

## 2025-05-12 NOTE — TELEPHONE ENCOUNTER
Caller: Patient     Doctor: Dr. Hung     Reason for call: Patient asked to discuss the equipment for the surgery.     Call back#: [phone]

## 2025-05-12 NOTE — TELEPHONE ENCOUNTER
Patient called me this morning in regards to the DME orders from [facility]. I tried contacting her. No answer. Voicemail has not been set up. I called her spouse ( on CC) and left him a voicemail explaining that I do not handle DME' s for surgery. I advised that they will more than likely reach back out to her. My phone number was provided asking her to call me back.

## 2025-05-13 NOTE — TELEPHONE ENCOUNTER
Re-attempted pt to discuss DME, no answer and VM is not set up. My Chart message sent yesterday, unread.

## 2025-05-13 NOTE — TELEPHONE ENCOUNTER
Spoke to pt, updated on DME status pending follow up with [facility]/office leadership this afternoon. Made aware that I will contact her back with an update. No additional needs at this time.

## 2025-05-13 NOTE — TELEPHONE ENCOUNTER
Caller: Patient- Shelia    Doctor: Dr. Hung     Reason for call: Patient is calling back in from a missed call relating to her surgery trans caller over to nurse navigator to assist further.

## 2025-05-14 ENCOUNTER — EVALUATION (OUTPATIENT)
Dept: PHYSICAL THERAPY | Facility: CLINIC | Age: 55
End: 2025-05-14
Attending: STUDENT IN AN ORGANIZED HEALTH CARE EDUCATION/TRAINING PROGRAM
Payer: COMMERCIAL

## 2025-05-14 DIAGNOSIS — M16.12 OSTEOARTHRITIS OF LEFT HIP, UNSPECIFIED OSTEOARTHRITIS TYPE: Primary | ICD-10-CM

## 2025-05-14 PROCEDURE — 97161 PT EVAL LOW COMPLEX 20 MIN: CPT

## 2025-05-14 NOTE — PROGRESS NOTES
PT Evaluation     Today's date: 2025  Patient name: Shelia Valdez  : 1970  MRN: [id number]  Referring provider: Jamison Hung MD  Dx:   Encounter Diagnosis     ICD-10-CM    1. Osteoarthritis of left hip, unspecified osteoarthritis type  M16.12           Start Time: 1630  Stop Time: 1700  Total time in clinic (min): 30 minutes    Assessment  Impairments: abnormal or restricted ROM, activity intolerance, impaired physical strength, pain with function, participation limitations and activity limitations    Assessment details: Patient is a 55 y.o. female who presents to physical therapy pre-operatively for a L ALANNAH scheduled tentatively for 25 with . Patient presents to evaluation with pain, decreased range of motion, decreased strength, and decreased tolerance to activity.  Patient is an appropriate candidate for skilled PT post-operatively and would benefit from skilled PT services to address the aforementioned impairments, achieve goals, maximize function, and improve quality of life. Pt is in agreement with this plan.    Patient Education: activity modifications as needed, pacing of activities, importance of HEP compliance, PT prognosis/POC, DVT prevention, ambulation/stair negotiation with walker      Goals  Pt will demonstrate proper understanding of ambulation/stair negotiation with FWW --met    Plan  Patient would benefit from: PT eval and skilled physical therapy    Plan of Care beginning date: 2025  Plan of Care expiration date: 2025  Plan details: Pt to return after surgery for therapy       Subjective Evaluation    History of Present Illness  Mechanism of injury: Pt is a 54 y/o female who presents to therapy for a pre-operative physical therapy appointment for a L ALANNAH scheduled for 25 with . Pt reports she has been having pain in the left hip for about a year. Pain was getting worse and she elected for surgery. She is having an anterior approach  done. Surgery is going to be same day.   Pain  Current pain ratin  At worst pain rating: 10        Objective     General Comments:      Hip Comments   Gait: antalgic gait, normal mechanics    L hip AROM  Flex 60 abd 24     L hip strength: deferred due to hip strength     L knee strength: 4+/5     Sensation: intact/symmetrical                POC expires Unit limit Auth Expiration date PT/OT/ST + Visit Limit?   25 BOMN 25 BOMN                           Visit/Unit Tracking  AUTH Status:  Date               N/a Used                Remaining                    Diagnosis: pre-op L hip ALANNAH scheduled tentatively for 25 with    Precautions:    POC Expires: 25   Re-evaluation Date:    FOTO Scores/Date: Goal -;    Visit Count 1/10       Manuals                                Ther Ex                                                                                Neuro Re-Ed                                                               Ther Act                                                                                 Modalities

## 2025-05-16 LAB
DME PARACHUTE DELIVERY DATE ACTUAL: NORMAL
DME PARACHUTE DELIVERY DATE REQUESTED: NORMAL
DME PARACHUTE ITEM DESCRIPTION: NORMAL
DME PARACHUTE ITEM DESCRIPTION: NORMAL
DME PARACHUTE ORDER STATUS: NORMAL
DME PARACHUTE SUPPLIER NAME: NORMAL
DME PARACHUTE SUPPLIER PHONE: NORMAL

## 2025-05-18 DIAGNOSIS — M16.12 OSTEOARTHRITIS OF LEFT HIP, UNSPECIFIED OSTEOARTHRITIS TYPE: ICD-10-CM

## 2025-05-19 RX ORDER — ASCORBIC ACID 500 MG
500 TABLET ORAL 2 TIMES DAILY
Qty: 30 TABLET | Refills: 0 | Status: SHIPPED | OUTPATIENT
Start: 2025-05-19

## 2025-05-19 RX ORDER — ZINC SULFATE 50(220)MG
1 CAPSULE ORAL DAILY
Qty: 30 CAPSULE | Refills: 0 | Status: SHIPPED | OUTPATIENT
Start: 2025-05-19

## 2025-05-20 ENCOUNTER — ANESTHESIA EVENT (OUTPATIENT)
Dept: PERIOP | Facility: HOSPITAL | Age: 55
End: 2025-05-20
Payer: COMMERCIAL

## 2025-05-21 ENCOUNTER — HOSPITAL ENCOUNTER (OUTPATIENT)
Facility: HOSPITAL | Age: 55
Setting detail: OUTPATIENT SURGERY
Discharge: HOME | End: 2025-05-21
Attending: STUDENT IN AN ORGANIZED HEALTH CARE EDUCATION/TRAINING PROGRAM | Admitting: STUDENT IN AN ORGANIZED HEALTH CARE EDUCATION/TRAINING PROGRAM
Payer: COMMERCIAL

## 2025-05-21 ENCOUNTER — ANESTHESIA (OUTPATIENT)
Dept: PERIOP | Facility: HOSPITAL | Age: 55
End: 2025-05-21
Payer: COMMERCIAL

## 2025-05-21 ENCOUNTER — APPOINTMENT (OUTPATIENT)
Dept: RADIOLOGY | Facility: HOSPITAL | Age: 55
End: 2025-05-21
Payer: COMMERCIAL

## 2025-05-21 VITALS
HEIGHT: 64 IN | TEMPERATURE: 97.1 F | HEART RATE: 64 BPM | BODY MASS INDEX: 36.85 KG/M2 | SYSTOLIC BLOOD PRESSURE: 106 MMHG | WEIGHT: 215.83 LBS | OXYGEN SATURATION: 96 % | RESPIRATION RATE: 23 BRPM | DIASTOLIC BLOOD PRESSURE: 53 MMHG

## 2025-05-21 DIAGNOSIS — M16.12 OSTEOARTHRITIS OF LEFT HIP, UNSPECIFIED OSTEOARTHRITIS TYPE: Primary | ICD-10-CM

## 2025-05-21 DIAGNOSIS — Z96.642 S/P TOTAL LEFT HIP ARTHROPLASTY: ICD-10-CM

## 2025-05-21 LAB
ABO GROUP BLD: NORMAL
ABO GROUP BLD: NORMAL
BLD GP AB SCN SERPL QL: NEGATIVE
RH BLD: POSITIVE
RH BLD: POSITIVE
SPECIMEN EXPIRATION DATE: NORMAL

## 2025-05-21 PROCEDURE — C1776 JOINT DEVICE (IMPLANTABLE): HCPCS | Performed by: STUDENT IN AN ORGANIZED HEALTH CARE EDUCATION/TRAINING PROGRAM

## 2025-05-21 PROCEDURE — 97163 PT EVAL HIGH COMPLEX 45 MIN: CPT

## 2025-05-21 PROCEDURE — C1713 ANCHOR/SCREW BN/BN,TIS/BN: HCPCS | Performed by: STUDENT IN AN ORGANIZED HEALTH CARE EDUCATION/TRAINING PROGRAM

## 2025-05-21 PROCEDURE — 73502 X-RAY EXAM HIP UNI 2-3 VIEWS: CPT

## 2025-05-21 PROCEDURE — 86900 BLOOD TYPING SEROLOGIC ABO: CPT | Performed by: STUDENT IN AN ORGANIZED HEALTH CARE EDUCATION/TRAINING PROGRAM

## 2025-05-21 PROCEDURE — 86850 RBC ANTIBODY SCREEN: CPT | Performed by: STUDENT IN AN ORGANIZED HEALTH CARE EDUCATION/TRAINING PROGRAM

## 2025-05-21 PROCEDURE — 86901 BLOOD TYPING SEROLOGIC RH(D): CPT | Performed by: STUDENT IN AN ORGANIZED HEALTH CARE EDUCATION/TRAINING PROGRAM

## 2025-05-21 DEVICE — PINNACLE CANCELLOUS BONE SCREW 6.5MM X 30MM
Type: IMPLANTABLE DEVICE | Site: HIP | Status: FUNCTIONAL
Brand: PINNACLE

## 2025-05-21 DEVICE — PINNACLE HIP SOLUTIONS ALTRX POLYETHYLENE ACETABULAR LINER NEUTRAL 36MM ID 52MM OD
Type: IMPLANTABLE DEVICE | Site: HIP | Status: FUNCTIONAL
Brand: PINNACLE ALTRX

## 2025-05-21 DEVICE — ACTIS DUOFIX HIP PROSTHESIS (FEMORAL STEM 12/14 TAPER CEMENTLESS SIZE 2 STD COLLAR)  CE
Type: IMPLANTABLE DEVICE | Site: HIP | Status: FUNCTIONAL
Brand: ACTIS

## 2025-05-21 DEVICE — PINNACLE GRIPTION ACETABULAR SHELL SECTOR 52MM OD
Type: IMPLANTABLE DEVICE | Site: HIP | Status: FUNCTIONAL
Brand: PINNACLE GRIPTION

## 2025-05-21 DEVICE — BIOLOX DELTA CERAMIC FEMORAL HEAD +1.5 36MM DIA 12/14 TAPER
Type: IMPLANTABLE DEVICE | Site: HIP | Status: FUNCTIONAL
Brand: BIOLOX DELTA

## 2025-05-21 RX ORDER — LIDOCAINE HYDROCHLORIDE 10 MG/ML
INJECTION, SOLUTION EPIDURAL; INFILTRATION; INTRACAUDAL; PERINEURAL AS NEEDED
Status: DISCONTINUED | OUTPATIENT
Start: 2025-05-21 | End: 2025-05-21

## 2025-05-21 RX ORDER — SENNOSIDES 8.6 MG
8.6 TABLET ORAL
Qty: 10 TABLET | Refills: 0 | Status: SHIPPED | OUTPATIENT
Start: 2025-05-21

## 2025-05-21 RX ORDER — DIPHENHYDRAMINE HYDROCHLORIDE 50 MG/ML
INJECTION, SOLUTION INTRAMUSCULAR; INTRAVENOUS AS NEEDED
Status: DISCONTINUED | OUTPATIENT
Start: 2025-05-21 | End: 2025-05-21

## 2025-05-21 RX ORDER — CEFAZOLIN SODIUM 2 G/50ML
2000 SOLUTION INTRAVENOUS ONCE
Status: COMPLETED | OUTPATIENT
Start: 2025-05-21 | End: 2025-05-21

## 2025-05-21 RX ORDER — ACETAMINOPHEN 10 MG/ML
1000 INJECTION, SOLUTION INTRAVENOUS ONCE
Status: COMPLETED | OUTPATIENT
Start: 2025-05-21 | End: 2025-05-21

## 2025-05-21 RX ORDER — DIPHENHYDRAMINE HCL 25 MG
25 TABLET ORAL EVERY 6 HOURS PRN
Qty: 30 TABLET | Refills: 0 | Status: SHIPPED | OUTPATIENT
Start: 2025-05-21

## 2025-05-21 RX ORDER — ALBUMIN HUMAN 50 G/1000ML
SOLUTION INTRAVENOUS CONTINUOUS PRN
Status: DISCONTINUED | OUTPATIENT
Start: 2025-05-21 | End: 2025-05-21

## 2025-05-21 RX ORDER — ONDANSETRON 2 MG/ML
INJECTION INTRAMUSCULAR; INTRAVENOUS AS NEEDED
Status: DISCONTINUED | OUTPATIENT
Start: 2025-05-21 | End: 2025-05-21

## 2025-05-21 RX ORDER — CHLORHEXIDINE GLUCONATE ORAL RINSE 1.2 MG/ML
15 SOLUTION DENTAL ONCE
Status: COMPLETED | OUTPATIENT
Start: 2025-05-21 | End: 2025-05-21

## 2025-05-21 RX ORDER — HYDROMORPHONE HCL/PF 1 MG/ML
0.5 SYRINGE (ML) INJECTION
Status: DISCONTINUED | OUTPATIENT
Start: 2025-05-21 | End: 2025-05-21 | Stop reason: HOSPADM

## 2025-05-21 RX ORDER — ONDANSETRON 4 MG/1
4 TABLET, FILM COATED ORAL EVERY 8 HOURS PRN
Qty: 20 TABLET | Refills: 0 | Status: SHIPPED | OUTPATIENT
Start: 2025-05-21

## 2025-05-21 RX ORDER — DEXAMETHASONE SODIUM PHOSPHATE 10 MG/ML
INJECTION, SOLUTION INTRAMUSCULAR; INTRAVENOUS AS NEEDED
Status: DISCONTINUED | OUTPATIENT
Start: 2025-05-21 | End: 2025-05-21

## 2025-05-21 RX ORDER — SODIUM CHLORIDE, SODIUM LACTATE, POTASSIUM CHLORIDE, CALCIUM CHLORIDE 600; 310; 30; 20 MG/100ML; MG/100ML; MG/100ML; MG/100ML
125 INJECTION, SOLUTION INTRAVENOUS CONTINUOUS
Status: DISCONTINUED | OUTPATIENT
Start: 2025-05-21 | End: 2025-05-21 | Stop reason: HOSPADM

## 2025-05-21 RX ORDER — BUPIVACAINE HYDROCHLORIDE 2.5 MG/ML
INJECTION, SOLUTION EPIDURAL; INFILTRATION; INTRACAUDAL; PERINEURAL AS NEEDED
Status: DISCONTINUED | OUTPATIENT
Start: 2025-05-21 | End: 2025-05-21 | Stop reason: HOSPADM

## 2025-05-21 RX ORDER — METOCLOPRAMIDE HYDROCHLORIDE 5 MG/ML
10 INJECTION INTRAMUSCULAR; INTRAVENOUS ONCE AS NEEDED
Status: DISCONTINUED | OUTPATIENT
Start: 2025-05-21 | End: 2025-05-21 | Stop reason: HOSPADM

## 2025-05-21 RX ORDER — ASPIRIN 81 MG/1
81 TABLET, CHEWABLE ORAL 2 TIMES DAILY
Qty: 70 TABLET | Refills: 0 | Status: SHIPPED | OUTPATIENT
Start: 2025-05-21 | End: 2025-06-25

## 2025-05-21 RX ORDER — DOCUSATE SODIUM 100 MG/1
100 CAPSULE, LIQUID FILLED ORAL 2 TIMES DAILY
Qty: 20 CAPSULE | Refills: 0 | Status: SHIPPED | OUTPATIENT
Start: 2025-05-21

## 2025-05-21 RX ORDER — PROPOFOL 10 MG/ML
INJECTION, EMULSION INTRAVENOUS CONTINUOUS PRN
Status: DISCONTINUED | OUTPATIENT
Start: 2025-05-21 | End: 2025-05-21

## 2025-05-21 RX ORDER — KETAMINE HCL IN NACL, ISO-OSM 100MG/10ML
SYRINGE (ML) INJECTION AS NEEDED
Status: DISCONTINUED | OUTPATIENT
Start: 2025-05-21 | End: 2025-05-21

## 2025-05-21 RX ORDER — PROPOFOL 10 MG/ML
INJECTION, EMULSION INTRAVENOUS AS NEEDED
Status: DISCONTINUED | OUTPATIENT
Start: 2025-05-21 | End: 2025-05-21

## 2025-05-21 RX ORDER — EPHEDRINE SULFATE 50 MG/ML
INJECTION INTRAVENOUS AS NEEDED
Status: DISCONTINUED | OUTPATIENT
Start: 2025-05-21 | End: 2025-05-21

## 2025-05-21 RX ORDER — FENTANYL CITRATE/PF 50 MCG/ML
50 SYRINGE (ML) INJECTION
Status: DISCONTINUED | OUTPATIENT
Start: 2025-05-21 | End: 2025-05-21 | Stop reason: HOSPADM

## 2025-05-21 RX ORDER — MAGNESIUM HYDROXIDE 1200 MG/15ML
LIQUID ORAL AS NEEDED
Status: DISCONTINUED | OUTPATIENT
Start: 2025-05-21 | End: 2025-05-21 | Stop reason: HOSPADM

## 2025-05-21 RX ORDER — CHLORHEXIDINE GLUCONATE 40 MG/ML
SOLUTION TOPICAL DAILY PRN
Status: DISCONTINUED | OUTPATIENT
Start: 2025-05-21 | End: 2025-05-21 | Stop reason: HOSPADM

## 2025-05-21 RX ORDER — KETOROLAC TROMETHAMINE 30 MG/ML
INJECTION, SOLUTION INTRAMUSCULAR; INTRAVENOUS AS NEEDED
Status: DISCONTINUED | OUTPATIENT
Start: 2025-05-21 | End: 2025-05-21 | Stop reason: HOSPADM

## 2025-05-21 RX ORDER — SODIUM CHLORIDE, SODIUM LACTATE, POTASSIUM CHLORIDE, CALCIUM CHLORIDE 600; 310; 30; 20 MG/100ML; MG/100ML; MG/100ML; MG/100ML
20 INJECTION, SOLUTION INTRAVENOUS CONTINUOUS
Status: DISCONTINUED | OUTPATIENT
Start: 2025-05-21 | End: 2025-05-21

## 2025-05-21 RX ORDER — TRANEXAMIC ACID 10 MG/ML
1000 INJECTION, SOLUTION INTRAVENOUS ONCE
Status: COMPLETED | OUTPATIENT
Start: 2025-05-21 | End: 2025-05-21

## 2025-05-21 RX ORDER — FENTANYL CITRATE 50 UG/ML
INJECTION, SOLUTION INTRAMUSCULAR; INTRAVENOUS AS NEEDED
Status: DISCONTINUED | OUTPATIENT
Start: 2025-05-21 | End: 2025-05-21

## 2025-05-21 RX ORDER — ROCURONIUM BROMIDE 10 MG/ML
INJECTION, SOLUTION INTRAVENOUS AS NEEDED
Status: DISCONTINUED | OUTPATIENT
Start: 2025-05-21 | End: 2025-05-21

## 2025-05-21 RX ORDER — MIDAZOLAM HYDROCHLORIDE 2 MG/2ML
INJECTION, SOLUTION INTRAMUSCULAR; INTRAVENOUS AS NEEDED
Status: DISCONTINUED | OUTPATIENT
Start: 2025-05-21 | End: 2025-05-21

## 2025-05-21 RX ORDER — ACETAMINOPHEN 500 MG
1000 TABLET ORAL EVERY 8 HOURS
Qty: 180 TABLET | Refills: 0 | Status: SHIPPED | OUTPATIENT
Start: 2025-05-21

## 2025-05-21 RX ORDER — DIPHENHYDRAMINE HCL 25 MG
25 TABLET ORAL ONCE
Status: COMPLETED | OUTPATIENT
Start: 2025-05-21 | End: 2025-05-21

## 2025-05-21 RX ORDER — HYDRALAZINE HYDROCHLORIDE 20 MG/ML
INJECTION INTRAMUSCULAR; INTRAVENOUS AS NEEDED
Status: DISCONTINUED | OUTPATIENT
Start: 2025-05-21 | End: 2025-05-21

## 2025-05-21 RX ORDER — CELECOXIB 100 MG/1
100 CAPSULE ORAL 2 TIMES DAILY
Qty: 60 CAPSULE | Refills: 0 | Status: SHIPPED | OUTPATIENT
Start: 2025-05-21

## 2025-05-21 RX ORDER — OXYCODONE HYDROCHLORIDE 5 MG/1
5 TABLET ORAL ONCE
Refills: 0 | Status: COMPLETED | OUTPATIENT
Start: 2025-05-21 | End: 2025-05-21

## 2025-05-21 RX ORDER — OXYCODONE HYDROCHLORIDE 5 MG/1
5 TABLET ORAL EVERY 4 HOURS PRN
Qty: 30 TABLET | Refills: 0 | Status: SHIPPED | OUTPATIENT
Start: 2025-05-21

## 2025-05-21 RX ORDER — GLYCOPYRROLATE 0.2 MG/ML
INJECTION INTRAMUSCULAR; INTRAVENOUS AS NEEDED
Status: DISCONTINUED | OUTPATIENT
Start: 2025-05-21 | End: 2025-05-21

## 2025-05-21 RX ORDER — SODIUM CHLORIDE 9 MG/ML
INJECTION INTRAVENOUS AS NEEDED
Status: DISCONTINUED | OUTPATIENT
Start: 2025-05-21 | End: 2025-05-21 | Stop reason: HOSPADM

## 2025-05-21 RX ADMIN — EPHEDRINE SULFATE 10 MG: 50 INJECTION, SOLUTION INTRAVENOUS at 11:02

## 2025-05-21 RX ADMIN — ACETAMINOPHEN 1000 MG: 10 INJECTION INTRAVENOUS at 13:24

## 2025-05-21 RX ADMIN — PROPOFOL 50 MG: 10 INJECTION, EMULSION INTRAVENOUS at 12:14

## 2025-05-21 RX ADMIN — CEFAZOLIN SODIUM 2000 MG: 2 SOLUTION INTRAVENOUS at 08:50

## 2025-05-21 RX ADMIN — PROPOFOL 50 MCG/KG/MIN: 10 INJECTION, EMULSION INTRAVENOUS at 11:13

## 2025-05-21 RX ADMIN — FENTANYL CITRATE 50 MCG: 50 INJECTION INTRAMUSCULAR; INTRAVENOUS at 13:00

## 2025-05-21 RX ADMIN — PROPOFOL 50 MG: 10 INJECTION, EMULSION INTRAVENOUS at 09:41

## 2025-05-21 RX ADMIN — GLYCOPYRROLATE 0.2 MG: 0.2 INJECTION INTRAMUSCULAR; INTRAVENOUS at 08:52

## 2025-05-21 RX ADMIN — Medication 50 MG: at 09:02

## 2025-05-21 RX ADMIN — SUGAMMADEX 200 MG: 100 INJECTION, SOLUTION INTRAVENOUS at 12:08

## 2025-05-21 RX ADMIN — DEXAMETHASONE SODIUM PHOSPHATE 10 MG: 10 INJECTION INTRAMUSCULAR; INTRAVENOUS at 08:43

## 2025-05-21 RX ADMIN — HYDRALAZINE HYDROCHLORIDE 6 MG: 20 INJECTION INTRAMUSCULAR; INTRAVENOUS at 09:39

## 2025-05-21 RX ADMIN — PHENYLEPHRINE HYDROCHLORIDE 20 MCG/MIN: 10 INJECTION INTRAVENOUS at 08:40

## 2025-05-21 RX ADMIN — EPHEDRINE SULFATE 10 MG: 50 INJECTION, SOLUTION INTRAVENOUS at 10:02

## 2025-05-21 RX ADMIN — PROPOFOL 100 MCG/KG/MIN: 10 INJECTION, EMULSION INTRAVENOUS at 08:40

## 2025-05-21 RX ADMIN — ROCURONIUM 10 MG: 50 INJECTION, SOLUTION INTRAVENOUS at 10:39

## 2025-05-21 RX ADMIN — EPHEDRINE SULFATE 5 MG: 50 INJECTION, SOLUTION INTRAVENOUS at 10:44

## 2025-05-21 RX ADMIN — ONDANSETRON 4 MG: 2 INJECTION INTRAMUSCULAR; INTRAVENOUS at 11:54

## 2025-05-21 RX ADMIN — ROCURONIUM 20 MG: 50 INJECTION, SOLUTION INTRAVENOUS at 09:26

## 2025-05-21 RX ADMIN — ROCURONIUM 50 MG: 50 INJECTION, SOLUTION INTRAVENOUS at 08:36

## 2025-05-21 RX ADMIN — HYDROMORPHONE HYDROCHLORIDE 0.5 MG: 1 INJECTION, SOLUTION INTRAMUSCULAR; INTRAVENOUS; SUBCUTANEOUS at 13:42

## 2025-05-21 RX ADMIN — MIDAZOLAM HYDROCHLORIDE 2 MG: 1 INJECTION, SOLUTION INTRAMUSCULAR; INTRAVENOUS at 08:31

## 2025-05-21 RX ADMIN — FENTANYL CITRATE 50 MCG: 50 INJECTION INTRAMUSCULAR; INTRAVENOUS at 12:50

## 2025-05-21 RX ADMIN — SODIUM CHLORIDE, SODIUM LACTATE, POTASSIUM CHLORIDE, AND CALCIUM CHLORIDE: .6; .31; .03; .02 INJECTION, SOLUTION INTRAVENOUS at 10:45

## 2025-05-21 RX ADMIN — DIPHENHYDRAMINE HYDROCHLORIDE 25 MG: 50 INJECTION, SOLUTION INTRAMUSCULAR; INTRAVENOUS at 10:48

## 2025-05-21 RX ADMIN — SODIUM CHLORIDE, SODIUM LACTATE, POTASSIUM CHLORIDE, AND CALCIUM CHLORIDE 125 ML/HR: .6; .31; .03; .02 INJECTION, SOLUTION INTRAVENOUS at 07:56

## 2025-05-21 RX ADMIN — PROPOFOL 200 MG: 10 INJECTION, EMULSION INTRAVENOUS at 08:36

## 2025-05-21 RX ADMIN — FENTANYL CITRATE 100 MCG: 50 INJECTION, SOLUTION INTRAMUSCULAR; INTRAVENOUS at 08:36

## 2025-05-21 RX ADMIN — DIPHENHYDRAMINE HYDROCHLORIDE 25 MG: 25 TABLET ORAL at 17:00

## 2025-05-21 RX ADMIN — LIDOCAINE HYDROCHLORIDE 50 MG: 10 INJECTION, SOLUTION EPIDURAL; INFILTRATION; INTRACAUDAL; PERINEURAL at 08:36

## 2025-05-21 RX ADMIN — FENTANYL CITRATE 50 MCG: 50 INJECTION, SOLUTION INTRAMUSCULAR; INTRAVENOUS at 09:37

## 2025-05-21 RX ADMIN — EPHEDRINE SULFATE 10 MG: 50 INJECTION, SOLUTION INTRAVENOUS at 10:21

## 2025-05-21 RX ADMIN — OXYCODONE HYDROCHLORIDE 5 MG: 5 TABLET ORAL at 15:36

## 2025-05-21 RX ADMIN — FENTANYL CITRATE 50 MCG: 50 INJECTION, SOLUTION INTRAMUSCULAR; INTRAVENOUS at 11:26

## 2025-05-21 RX ADMIN — CHLORHEXIDINE GLUCONATE 15 ML: 1.2 SOLUTION ORAL at 07:56

## 2025-05-21 RX ADMIN — PROPOFOL 50 MG: 10 INJECTION, EMULSION INTRAVENOUS at 09:39

## 2025-05-21 RX ADMIN — ALBUMIN (HUMAN): 12.5 INJECTION, SOLUTION INTRAVENOUS at 11:04

## 2025-05-21 RX ADMIN — TRANEXAMIC ACID 1000 MG: 10 INJECTION, SOLUTION INTRAVENOUS at 08:41

## 2025-05-21 RX ADMIN — ROCURONIUM 10 MG: 50 INJECTION, SOLUTION INTRAVENOUS at 11:09

## 2025-05-21 NOTE — PHYSICAL THERAPY NOTE
Physical Therapy Evaluation    Patient's Name: Shelia Valdez    Admitting Diagnosis  Osteoarthritis of left hip, unspecified osteoarthritis type [M16.12]    Problem List  Problem List[1]    Past Medical History  Past Medical History[2]    Past Surgical History  Past Surgical History[3]    Recent Imaging  XR hip/pelv 2-3 vws left if performed   Final Result by Long James MD (05/21 1204)      Fluoroscopy provided for procedure guidance.      Please refer to the separate procedure note for additional details.                  Workstation performed: [id number]             Recent Vital Signs  Vitals:    05/21/25 1530 05/21/25 1545 05/21/25 1603 05/21/25 1604   BP: 101/53 122/57 111/58    Pulse: 62 67 65    Resp: 15 17 14    Temp:       TempSrc:       SpO2: 98% 98%  99%   Weight:       Height:            05/21/25 1603   PT Last Visit   PT Visit Date 05/21/25   Note Type   Note type Evaluation   Pain Assessment   Pain Assessment Tool FLACC   Pain Rating: FLACC (Rest) - Face 0   Pain Rating: FLACC (Rest) - Legs 0   Pain Rating: FLACC (Rest) - Activity 0   Pain Rating: FLACC (Rest) - Cry 0   Pain Rating: FLACC (Rest) - Consolability 0   Score: FLACC (Rest) 0   Pain Rating: FLACC (Activity) - Face 1   Pain Rating: FLACC (Activity) - Legs 1   Pain Rating: FLACC (Activity) - Activity 1   Pain Rating: FLACC (Activity) - Cry 1   Pain Rating: FLACC (Activity) - Consolability 1   Score: FLACC (Activity) 5   Restrictions/Precautions   Weight Bearing Precautions Per Order Yes   LLE Weight Bearing Per Order WBAT   Braces or Orthoses   (none)   Other Precautions Bed Alarm;Chair Alarm;WBS;Fall Risk;Pain;Telemetry;Multiple lines   Home Living   Type of Home House  (bi-level)   Home Layout Two level  (6 ELI + 3 stairs to bed room)   Bathroom Shower/Tub Walk-in shower   Bathroom Toilet Standard   Bathroom Equipment Shower chair;Commode   Bathroom Accessibility Accessible   Home Equipment Walker   Additional  Comments no AD at baseline   Prior Function   Level of [place] Independent with ADLs;Independent with functional mobility;Independent with IADLS   Lives With Spouse;Son   Receives Help From Family   IADLs Independent with driving;Independent with meal prep;Independent with medication management   Falls in the last 6 months 0   Vocational Full time employment  (medical assistant)   General   Family/Caregiver Present Yes   Cognition   Overall Cognitive Status WFL   Arousal/Participation Alert   Orientation Level Oriented X4   Memory Within functional limits   Following Commands Follows all commands and directions without difficulty   Comments Pt agreeable to PT session   RLE Assessment   RLE Assessment   (grossly 3+/5 observed through functional mobility)   LLE Assessment   LLE Assessment   (grossly 3+/5 observed through functional mobility)   Vision-Basic Assessment   Current Vision   (glasses at night for drving)   Coordination   Sensation WFL   Bed Mobility   Supine to Sit 4  Minimal assistance   Additional items Assist x 1;Increased time required;Verbal cues;LE management   Transfers   Sit to Stand 4  Minimal assistance   Additional items Assist x 1;Increased time required;Armrests;Verbal cues   Stand to Sit 4  Minimal assistance   Additional items Assist x 1;Increased time required;Verbal cues;Armrests   Additional Comments with RW   Ambulation/Elevation   Gait pattern Short stride;Step to;Excessively slow;Decreased L stance   Gait Assistance 4  Minimal assist   Additional items Assist x 1;Verbal cues   Assistive Device Rolling walker   Distance 15', 15'   Stair Management Assistance 4  Minimal assist   Additional items Assist x 1;Verbal cues;Increased time required   Stair Management Technique Step to pattern;Foreward;With walker  (6 inch practice stair)   Number of Stairs 3   Balance   Static Sitting Fair +   Dynamic Sitting Fair   Static Standing Fair   Dynamic Standing Fair -   Ambulatory Fair -    Activity Tolerance   Activity Tolerance Patient limited by fatigue;Patient limited by pain   Medical Staff Made Aware Jose Paulino PA-C; STAN Jones   Assessment   Prognosis Good   Problem List Decreased strength;Decreased endurance;Impaired balance;Decreased mobility;Pain;Orthopedic restrictions  (WBAT LLE)   Assessment Shelia Valdez is a 55 y.o. female admitted to [facility] on 5/21/2025 for Left total hip arthroplasty. Pt  has a past medical history of Asthma (N/a), Fatigue, Hypertension, Migraine, and Proteinuria.. Order placed for PT eval and tx. PT was consulted and pt was seen on 5/21/2025 for mobility assessment and d/c planning. Chart review and two person identifiers were completed.   Currently pt presents with decreased strength , decreased static sitting balance , decreased dynamic sitting balance , decreased static standing balance, decreased dynamic standing balance , decreased gait speed, decreased step length , and decreased muscular endurance . Due to these impairments, they will require assistance to perform bed mobility, sit to stand , ambulation, stair negotiation, and transfers. Pt is currently functioning at a minimum assistance x1 level for bed mobility, minimum assistance x1 level for transfers, minimum assistance x1 level for ambulation with Rolling Walker, and minimum assistance x1 for stair climbing/elevations. These activity limitations significantly impact their ability to participate in previous home and community roles and responsibilities , ambulation in home, and ambulation in the community. The patient's -[facility] Basic Mobility Inpatient Short Form Raw Score is 18. PT recommends level III minimum resource intensity. They will benefit from skilled therapy to to reduce the risk of falls and to maximize functional potential.   Barriers to Discharge Other (Comment);Inaccessible home environment  (decline in functional mobility)   Goals   STG Expiration Date 05/31/25   Short Term  Goal #1 Within 10 days patient will complete: 1) Bed mobility skills with modified independent assistance to facilitate safe return to previous living environment 2) Functional transfers with modified independent assistance to facilitate safe return to previous living environment  3) Ambulation with least restrictive AD modified independent assistance without LOB and stable vitals for safe ambulation home/ community distances. 4) Stair training up/down flight of 8 step/s with appropriate rail/s and modified independent assistance for safe access to previous living environment. 5) Improve balance by 1 grade to reduce risk of falls. 6)Improve LE strength grades by 1 to increase independence w/ transfers and gait.  7) PT for ongoing pt and family education; DME needs and D/C planning to promote highest level of function in least restrictive environment.   Plan   Treatment/Interventions Functional transfer training;LE strengthening/ROM;Therapeutic exercise;Endurance training;Elevations;Patient/family training;Equipment eval/education;Gait training;Bed mobility;Continued evaluation;Spoke to nursing   PT Frequency Twice a day   Discharge Recommendation   Rehab Resource Intensity Level, PT III (Minimum Resource Intensity)   Equipment Recommended Walker  (pt has at bedside)   AM-PAC Basic Mobility Inpatient   Turning in Flat Bed Without Bedrails 3   Lying on Back to Sitting on Edge of Flat Bed Without Bedrails 3   Moving Bed to Chair 3   Standing Up From Chair Using Arms 3   Walk in Room 3   Climb 3-5 Stairs With Railing 3   Basic Mobility Inpatient Raw Score 18   Basic Mobility Standardized Score 41.05   [facility] Highest Level Of Mobility   -HLM Goal 6: Walk 10 steps or more   -HLM Achieved 7: Walk 25 feet or more   End of Consult   Patient Position at End of Consult Bedside chair;All needs within reach        Recommendations                                                                                                               Pt will benefit from continued skilled IP PT to address the above mentioned impairments in order to maximize recovery and increase functional independence when completing mobility and ADLs. See flow sheet for goals and POC.     PT Evaluation Time: 1392-4304    Yayo Spencer, PT, DPT         [1]   Patient Active Problem List  Diagnosis    Essential (primary) hypertension    Proteinuria    Migraine without aura and without status migrainosus, not intractable    Status post total hysterectomy    Family history of breast cancer    Vitamin D deficiency    Vegetarian diet    Spider veins of both lower extremities    Familial hypercholesterolemia    Primary insomnia    Primary osteoarthritis of left hip    Obesity (BMI 30-39.9)    Osteoarthritis of left hip, unspecified osteoarthritis type    Osteoarthritis of left hip   [2]   Past Medical History:  Diagnosis Date    Asthma N/a    Fatigue     Hypertension     Migraine     Proteinuria    [3]   Past Surgical History:  Procedure Laterality Date    BREAST BIOPSY Left 01/01/2015    bengin    ENDOMETRIAL BIOPSY  02/26/2018    FOOT SURGERY Bilateral     reconstructive    HYSTERECTOMY  09/04/2018    MOUTH SURGERY  2024    left cheek ( lump)    TONSILLECTOMY      TUBAL LIGATION

## 2025-05-21 NOTE — PLAN OF CARE
Problem: PHYSICAL THERAPY ADULT  Goal: Performs mobility at highest level of function for planned discharge setting.  See evaluation for individualized goals.  Description: Treatment/Interventions: Functional transfer training, LE strengthening/ROM, Therapeutic exercise, Endurance training, Elevations, Patient/family training, Equipment eval/education, Gait training, Bed mobility, Continued evaluation, Spoke to nursing  Equipment Recommended: Walker (pt has at bedside)       See flowsheet documentation for full assessment, interventions and recommendations.  Note: Prognosis: Good  Problem List: Decreased strength, Decreased endurance, Impaired balance, Decreased mobility, Pain, Orthopedic restrictions (WBAT LLE)  Assessment: Shelia Valdez is a 55 y.o. female admitted to [facility] on 5/21/2025 for Left total hip arthroplasty. Pt  has a past medical history of Asthma (N/a), Fatigue, Hypertension, Migraine, and Proteinuria.. Order placed for PT eval and tx. PT was consulted and pt was seen on 5/21/2025 for mobility assessment and d/c planning. Chart review and two person identifiers were completed.   Currently pt presents with decreased strength , decreased static sitting balance , decreased dynamic sitting balance , decreased static standing balance, decreased dynamic standing balance , decreased gait speed, decreased step length , and decreased muscular endurance . Due to these impairments, they will require assistance to perform bed mobility, sit to stand , ambulation, stair negotiation, and transfers. Pt is currently functioning at a minimum assistance x1 level for bed mobility, minimum assistance x1 level for transfers, minimum assistance x1 level for ambulation with Rolling Walker, and minimum assistance x1 for stair climbing/elevations. These activity limitations significantly impact their ability to participate in previous home and community roles and responsibilities , ambulation in home, and ambulation  in the community. The patient's AM-PAC Basic Mobility Inpatient Short Form Raw Score is 18. PT recommends level III minimum resource intensity. They will benefit from skilled therapy to to reduce the risk of falls and to maximize functional potential.  Barriers to Discharge: Other (Comment), Inaccessible home environment (decline in functional mobility)     Rehab Resource Intensity Level, PT: III (Minimum Resource Intensity)    See flowsheet documentation for full assessment.

## 2025-05-21 NOTE — ANESTHESIA POSTPROCEDURE EVALUATION
Post-Op Assessment Note    CV Status:  Stable  Pain Score: 2    Pain management: adequate       Mental Status:  Alert and awake   Hydration Status:  Euvolemic   PONV Controlled:  Controlled   Airway Patency:  Patent     Post Op Vitals Reviewed: Yes    No anethesia notable event occurred.    Staff: Anesthesiologist, with CRNAs           Last Filed PACU Vitals:  Vitals Value Taken Time   Temp 98.1 °F (36.7 °C) 05/21/25 14:00   Pulse 116 05/21/25 14:06   BP 97/55 05/21/25 14:00   Resp 35 05/21/25 14:06   SpO2 98 % 05/21/25 14:06   Vitals shown include unfiled device data.    Modified Marco:     Vitals Value Taken Time   Activity 2 05/21/25 14:00   Respiration 2 05/21/25 14:00   Circulation 1 05/21/25 14:00   Consciousness 2 05/21/25 14:00   Oxygen Saturation 1 05/21/25 14:00     Modified Marco Score: 8

## 2025-05-21 NOTE — ASSESSMENT & PLAN NOTE
Patient presents to the hospital today for left total hip arthroplasty  Patient was seen 3/31/2025 in the clinic where we discussed history, exam, and imaging with patient. Presentation most consistent with left hip osteoarthritis. Management options were discussed in the form of conservative treatment with physical therapy, oral medications, intra-articular injections versus surgical management in the form of total hip arthroplasty.  Surgery was discussed in depth including steps of surgery, risks, postoperative expectations, and rehab plan.  After thorough discussion, patient elected to proceed forward with surgical intervention.  Preop testing was ordered and patient met with surgical schedulers to set up a date for surgery.  She presents today for surgical intervention in the form of left total hip arthroplasty.  Plan for follow-up 2 weeks postoperatively in the clinic.

## 2025-05-21 NOTE — OP NOTE
OPERATIVE REPORT  PATIENT NAME: Shelia Valdez  : 1970  MRN: [id number]  Pt Location:  [facility] OR ROOM 05    Surgery Date: 2025    Surgeons and Role:     * Jamison Hung MD - Primary     * Jose Paulino PA-C - Assisting     Preop Diagnosis:  Osteoarthritis of left hip, unspecified osteoarthritis type [M16.12]    Post-Op Diagnosis Codes:     * Osteoarthritis of left hip, unspecified osteoarthritis type [M16.12]    Procedure(s):  Left - ARTHROPLASTY HIP TOTAL ANTERIOR    Specimens:  * No specimens in log *    Estimated Blood Loss:   400 mL    Drains:  * No LDAs found *    Anesthesia Type:   General     Operative Indications:  Osteoarthritis of left hip, unspecified osteoarthritis type [M16.12]    Operative Findings:  Osteoarthritis of left hip with deformity of left femoral head    Complications:   None    Patient was met in the preoperative holding area where she had the operative extremity marked.  We reviewed the consent for surgery as well as the risks of surgery  including, but not limited to: Infection, bleeding, damage to local surrounding structures, iatrogenic fracture, limb length discrepancy, LFCN numbness, aseptic loosening, periprosthetic fracture, DVT, PE and risks of anesthesia.  Patient expressed understanding and wished to proceed forward with surgical intervention.  She was then taken back to the operative suite where she was intubated by anesthesia team then had boots placed and was transferred to the operative table after which she was positioned appropriately.  Fluoroscopic imaging was performed to ensure appropriate pelvic position.  Limb was cleansed with chlorhexidine scrub brush then prepped with ChloraPrep stick followed by DuraPrep and draped in a standard sterile fashion.  An operative timeout was performed to ensure we had the correct patient, correct site of surgery, that antibiotics and TXA have been given, that all necessary equipment and implants were available, and  that there were no concerns by operative team members.     We began the case by making a 12 cm incision for a standard direct anterior approach starting point lateral and distal to ASIS.  Subcutaneous bleeders were controlled using Bovie.  Knife was then used to incise through subcutaneous fat until down to the level of fascia.  A 1 cm strip of fascia was cleared medially and laterally using a Jiang to facilitate closure at end of case.  Fascia was then incised with knife in line with our incision.  Allis clamps were used to hold fascia taut anteriorly and Jiang was used to release the tensor off the undersurface of fascia.  Gelpi was placed within our interval and cobra retractor was placed over the superior neck.  A combination of Bovie and aquamantys were then used to isolate the circumflex vessels which were cauterized using aquamantys prior to being incised with Bovie. Second cobra retractor was placed to inferior neck.  Jiang was used to clear a path for anterior acetabular retraction followed by placement of a double bent Hohmann retractor.  H capsulotomy was performed using Bovie and the leaflets were tagged using Ethibond suture.  Templating guide was then used to establish level of femoral neck cut.  After which femoral neck was cut.  Corkscrew was used to remove the femoral head which was sized on the back table.  Retractors were placed for acetabular visualization.  Labrum was removed and superior and inferior capsule were released.  We then proceeded with sequential reaming beginning with a size 47 reamer up to a size 51 reamer.  Trial was placed followed by irrigation using Betadine and saline and placement of our final size 52 mm acetabular cup.  Fluoroscopic imaging was used to confirm appropriate abduction and version.  There was excellent purchase in bone and 1 acetabular screw was placed after drilling bicortically.  Wound was then irrigated with Betadine and saline followed by placement of our  acetabular liner.  [place] was used to ensure 3 tabs were down.      We then proceeded with our femoral preparation.  Capsule was released from saddle ensuring not to release piriformis or external rotators.  Retractors were placed for femoral preparation and limb was then placed into extension and adduction.  Box osteotome, then canal finder was used followed by sequential broaching starting with a starter broach up to a size 2 broach. If We then trialed a standard neck and +1.5 ball.  Reduction was performed after retractors were removed.  Patient was noted to be stable at 90 degrees of external rotation and extension down to level of floor. Limb lengths were assessed at this time as well as offset using fluoroscopic imaging. We removed broach handle after retractor placement and appropriate positioning of femur and opened final implant.  Wound was again copiously irrigated using Betadine and saline and final implant was placed which sat up 2 mm greater than trial.  Trunnion was dried with a lap and final 36 mm ball was placed.  Retractors were removed and reduction performed.  Stability test was again performed demonstrating that patient had excellent stability at 90 degrees external rotation and full limb extension.  We took final fluoroscopic imaging and then proceeded with closure.  This included again copious irrigation of the wound with Betadine followed by saline.  Wound then closed with 0 Ethibond for capsular closure #1 strata fix for fascial closure, 0 Vicryl for deep subcutaneous tissue closure in a 2 layer fashion, 2-0 Vicryl for subcutaneous tissue closure and staples for skin. Pain injection was administered consisting of dilute toradol and 0.25% Marcaine. Skin was then cleansed, dried and a Mepilex dressing was applied.  Drapes were removed, patient was transported back to his gurney and thereafter to the recovery unit without incident.     I was present for the entire procedure. A qualified resident  physician was not available. A physician assistant was required during the procedure for draping, intraoperative limb manipulation, retraction, and suturing.     Patient Disposition:  PACU  and extubated and stable    Postop Plan  - WBAT LLE  - Anterior Hip Precautions  - DVT ppx: ASA 81mg starting 9 PM tonight x 35 days  - Multimodal pain control   - PT/OT for mobilization\    SIGNATURE: Jamison Hung MD  DATE: May 21, 2025  TIME: 12:48 PM

## 2025-05-21 NOTE — ANESTHESIA PREPROCEDURE EVALUATION
Procedure:  ARTHROPLASTY HIP TOTAL ANTERIOR (Left: Hip)    Relevant Problems   CARDIO   (+) Essential (primary) hypertension   (+) Familial hypercholesterolemia   (+) Migraine without aura and without status migrainosus, not intractable   (+) Spider veins of both lower extremities      MUSCULOSKELETAL   (+) Osteoarthritis of left hip   (+) Osteoarthritis of left hip, unspecified osteoarthritis type   (+) Primary osteoarthritis of left hip      NEURO/PSYCH   (+) Migraine without aura and without status migrainosus, not intractable        Physical Exam    Airway     Mallampati score: III  TM Distance: >3 FB  Neck ROM: full      Cardiovascular      Dental   No notable dental hx     Pulmonary      Neurological      Other Findings  post-pubertal.      Anesthesia Plan  ASA Score- 3     Anesthesia Type- general with ASA Monitors.         Additional Monitors:     Airway Plan: Oral ETT.           Plan Factors-Exercise tolerance (METS): >4 METS.    Chart reviewed. EKG reviewed.  Existing labs reviewed. Patient summary reviewed.    Patient is not a current smoker.      There is medical exclusion for perioperative obstructive sleep apnea risk education.        Induction- intravenous.    Postoperative Plan- Plan for postoperative opioid use.   Monitoring Plan - Monitoring plan - standard ASA monitoring  Post Operative Pain Plan - plan for postoperative opioid use        Informed Consent- Anesthetic plan and risks discussed with patient.  I personally reviewed this patient with the CRNA. Discussed and agreed on the Anesthesia Plan with the CRNA..      NPO Status:  Vitals Value Taken Time   Date of last liquid 05/20/25 05/21/25 07:25   Time of last liquid 2100 05/21/25 07:25   Date of last solid 05/20/25 05/21/25 07:25   Time of last solid 2100 05/21/25 07:25

## 2025-05-21 NOTE — ANESTHESIA POSTPROCEDURE EVALUATION
Post-Op Assessment Note    CV Status:  Stable  Pain Score: 0    Pain management: adequate       Mental Status:  Arousable   Hydration Status:  Stable   PONV Controlled:  None   Airway Patency:  Patent     Post Op Vitals Reviewed: Yes    No anethesia notable event occurred.    Staff: CRNA           Last Filed PACU Vitals:  Vitals Value Taken Time   Temp 97    Pulse 68 05/21/25 12:42   /58 05/21/25 12:37   Resp 20 05/21/25 12:42   SpO2 100 % 05/21/25 12:42   Vitals shown include unfiled device data.

## 2025-05-21 NOTE — DISCHARGE INSTR - AVS FIRST PAGE
.    Jamison Hung MD  Department of Orthopaedic Surgery, Sports Medicine  [facility], [facility]  [address] [place] [address]   [phone]    Total hip arthroplasty Postoperative Instructions  ************************************************************************************************************************************************************************************************************  PLEASE READ ALL OF THESE INSTRUCTIONS CAREFULLY.    IF YOU HAVE QUESTIONS, PLEASE READ PRIOR TO CONTACTING THE OFFICE.    THANK YOU!  ___________________________________________________________________________________________    Medication     Take Acetaminophen 500 mg 2 tabs (1000 mg total) every 8 hours (not to exceed 6 tabs per day or greater than 3000mg per day)  Take 1-2 tablets every 4-6 hours as needed for pain of:  Oxycodone 5 mg  If persistent pain with prescribed medication, may supplement with:  Celebrex 100 mg every 12 hours (Always take with food)  Take aspirin 81 mg twice daily for 35 days  You have been given a prescription for Zofran. It will be ready for  at your pharmacy on file.  Fill this prescription ONLY IF you have severe nausea.  You are provided a script for stool softeners. Please take these while taking any narcotic medication to prevent constipation.  ___________________________________________________________________________________________    Activity  Strict anterior hip precautions  Use ice as needed 20 minutes on, then 20 minutes off for the first 2 weeks   ___________________________________________________________________________________________    Showering  You may shower 3 days after surgery unless told otherwise.  You may shower as long as your dressing is intact.  DO NOT immerse the hip under water and DO NOT rub the incision.    Dressing Care and Removal  You can expect some light bloody wound seepage through the  bandage. DO NOT BE ALARMED. This is normal.  Leave dressings intact.  These will be removed at your postoperative visit clinic.    ________________________________________________________________________________    Procedure Performed   Left total hip arthroplasty    ________________________________________________________________________________    Physical Therapy   You have been given a physical therapy prescription.  Please call to schedule an appointment.  You should begin physical therapy within 3 - 5 days    Follow-Up   Your first post-operative appointment has been scheduled for:        Date:    6/2/2025      time:    4:00 pm    To make changes to your post-operative visit, please contact office at [phone].  For pain medication refills, you must call Monday-Friday between 8:30am-3 pm. Please allow 24-48 hours' notice.  Please contact our office for any issues below or specific questions:        Any fever over 101.5 degrees, Excessive bloody wound seepage, Severe calf tenderness, Numbness in the arm or hand (after 24 hrs)    IF YOU HAVE ANY QUESTIONS, PLEASE READ THESE INSTRUCTIONS CAREFULLY PRIOR TO CONTACTING THE OFFICE.  THANK YOU!

## 2025-05-21 NOTE — H&P
H&P - Orthopedics   Name: Shelia Valdez 55 y.o. female I MRN: [id number]  Unit/Bed#: OR POOL I Date of Admission: 5/21/2025   Date of Service: 5/21/2025 I Hospital Day: 0     Assessment & Plan  Osteoarthritis of left hip  Patient presents to the hospital today for left total hip arthroplasty  Patient was seen 3/31/2025 in the clinic where we discussed history, exam, and imaging with patient. Presentation most consistent with left hip osteoarthritis. Management options were discussed in the form of conservative treatment with physical therapy, oral medications, intra-articular injections versus surgical management in the form of total hip arthroplasty.  Surgery was discussed in depth including steps of surgery, risks, postoperative expectations, and rehab plan.  After thorough discussion, patient elected to proceed forward with surgical intervention.  Preop testing was ordered and patient met with surgical schedulers to set up a date for surgery.  She presents today for surgical intervention in the form of left total hip arthroplasty.  Plan for follow-up 2 weeks postoperatively in the clinic.    History of Present Illness   HPI: Shelia Valdez is a 55 y.o. year old female who presents to the operating room today for left total hip arthroplasty.    Per history from 3/31/2025 visit:  Pain has worsened since her last visit. Pain is localized to the groin. She has difficulty with putting on her shoes and tieing them. She has attended physical therapy recently for right sided sciatica, but notes this pain is completely different. Her PCP recently prescribed her flexeril and prednisone without improvement in symptoms. She was previously very active but her hip continues to limit her mobility. She feels that her hips are roughly level.     Review of Systems significant for findings described in the HPI.  Historical Information   Past Medical History:   Diagnosis Date    Asthma N/a    Fatigue      "Hypertension     Migraine     Proteinuria      Past Surgical History:   Procedure Laterality Date    BREAST BIOPSY Left 01/01/2015    bengin    ENDOMETRIAL BIOPSY  02/26/2018    FOOT SURGERY Bilateral     reconstructive    HYSTERECTOMY  09/04/2018    MOUTH SURGERY  2024    left cheek ( lump)    TONSILLECTOMY      TUBAL LIGATION       Social History     Tobacco Use    Smoking status: Never     Passive exposure: Never    Smokeless tobacco: Never   Vaping Use    Vaping status: Never Used   Substance and Sexual Activity    Alcohol use: Never    Drug use: Never    Sexual activity: Yes     Partners: Male     E-Cigarette/Vaping    E-Cigarette Use Never User      E-Cigarette/Vaping Substances    Nicotine No     THC No     CBD No        Objective :     Physical ExamOrtho Exam   Musculoskeletal:     Left Hip Exam   No swelling, bruising or deformity  Skin intact without rash  Leg lengths are equal   Mild Tender to palpation at the greater trochanter, nonTTP elsewhere about proximal LE   Passive ROM  Flexion: 85  Internal Rotation: 5  External Rotation: 45  Provocative Maneuvers   (+) Impingement, Stinchfield  (-): straight leg raise  Strength testing  4/5 hip flexor   SILT sa/ramos/sp/dp/t  2+ PT pulse    Gen: No acute distress, resting comfortably in bed  HEENT: Eyes clear, moist mucus membranes, hearing intact  Respiratory: No audible wheezing or stridor  Cardiovascular: Well Perfused peripherally, 2+ distal pulse  Abdomen: nondistended, no peritoneal signs           Lab Results: I have reviewed the following results:   No results for input(s): \"WBC\", \"HGB\", \"HCT\", \"PLT\", \"BANDSPCT\", \"BUN\", \"CREATININE\", \"PTT\", \"INR\", \"ESR\", \"CRP\" in the last 72 hours.  Blood Culture: No results found for: \"BLOODCX\"  Wound Culture: No results found for: \"WOUNDCULT\"    X-rays of left hip and pelvis from 10/5/2024 including AP pelvis, AP hip, and frog lateral demonstrate complete joint space narrowing at weightbearing dome with femoral head " osteophyte formation and mild subchondral sclerosis to acetabulum. No evidence of fracture or dislocation.

## 2025-05-22 ENCOUNTER — TELEPHONE (OUTPATIENT)
Dept: OBGYN CLINIC | Facility: MEDICAL CENTER | Age: 55
End: 2025-05-22

## 2025-05-22 ENCOUNTER — TELEPHONE (OUTPATIENT)
Dept: OBGYN CLINIC | Facility: HOSPITAL | Age: 55
End: 2025-05-22

## 2025-05-22 NOTE — TELEPHONE ENCOUNTER
Called patient to check in after surgery. Doing well at this time. Pain is present but has been tolerable. She is only taking Tylenol, Celebrex, and ASA to avoid narcotic at this time. Aware of postop wound care, activity, and medication recommendations. Feels some swelling/warmth to thigh. Denies any chest pain, palpitations, dyspnea, calf pain, fevers, chills, or wound concerns. She has been successfully ambulating to the bathroom with adhering to mobility restrictions. We will continue with plan for previously scheduled 2 week follow-up and patient will call if there are any issues or questions that arise before that time.

## 2025-05-22 NOTE — TELEPHONE ENCOUNTER
"Patient contacted for a postoperative follow up assessment. Patient states current pain level of a \"pain is tolerable and managed with pain medications\" and is walking with RW.  Patient confirms she also has BSC. Patient reports increase in swelling, bruising and dressing is Dressing C/D/I. Patient is not icing the site regularly. Educated on the importance of icing for swelling control or soreness, 20 min on/off, rotating sites. Educated on normal postop ALANNAH swelling. NN educated patient on post-op bruising, swelling, icing, and constipation.    Reviewed the following AVS instructions:   Use ice as needed 20 minutes on, then 20 minutes off for the first 2 weeks    Confirmed upcoming appointments w/ patient:   PT 5/27  Postop 6/2     We reviewed patients AVS medication list. Patient is taking Tylenol 1000mg every 8 hours, Oxycodone 5mg PRN, ASA 81mg BID, Colace 100mg BID, and Celebrex 100mg BID, Zofran 4mg PRN, senokot daily. Patient has not had a BM but is taking prescribed Colace/senokot. Discussed postop constipation, increasing fluids/fiber, prunes or prune juice.     Patient is prescribed Oxycodone PRN, but has not used this yet since DC from hospital. Per pt, she has allergy (itching) to Oxycodone and was also prescribed benadryl to take PRN for itching. Pt states this was tested in the hospital, prior to discharge, and educated on use. Confirms she is managing pain with Tylenol right now, discussed celebrex 100mg BID as prescribed. Pt verbalizes understanding.      Patient denies nausea, vomiting, abdominal pain, chest pain, shortness of breath, fever, dizziness, and calf pain. Patient does not have any other questions or concerns at this time. Pt was encouraged to call with any questions, concerns or issues.  .      Pt confirms she has direct NN c/b number, encouraged to contact NN with any questions or concerns.   "

## 2025-05-26 ENCOUNTER — APPOINTMENT (OUTPATIENT)
Dept: PHYSICAL THERAPY | Facility: CLINIC | Age: 55
End: 2025-05-26
Attending: STUDENT IN AN ORGANIZED HEALTH CARE EDUCATION/TRAINING PROGRAM
Payer: COMMERCIAL

## 2025-05-27 ENCOUNTER — OFFICE VISIT (OUTPATIENT)
Dept: PHYSICAL THERAPY | Facility: CLINIC | Age: 55
End: 2025-05-27
Attending: STUDENT IN AN ORGANIZED HEALTH CARE EDUCATION/TRAINING PROGRAM
Payer: COMMERCIAL

## 2025-05-27 DIAGNOSIS — M16.12 OSTEOARTHRITIS OF LEFT HIP, UNSPECIFIED OSTEOARTHRITIS TYPE: Primary | ICD-10-CM

## 2025-05-27 PROCEDURE — 97110 THERAPEUTIC EXERCISES: CPT

## 2025-05-27 PROCEDURE — 97164 PT RE-EVAL EST PLAN CARE: CPT

## 2025-05-27 RX ORDER — TRAMADOL HYDROCHLORIDE 50 MG/1
50 TABLET ORAL EVERY 6 HOURS PRN
Qty: 30 TABLET | Refills: 0 | Status: SHIPPED | OUTPATIENT
Start: 2025-05-27

## 2025-05-27 NOTE — PROGRESS NOTES
PT Re-Evaluation     Today's date: 2025  Patient name: Shelia Valdez  : 1970  MRN: [id number]  Referring provider: Jamison Hung MD  Dx:   Encounter Diagnosis     ICD-10-CM    1. Osteoarthritis of left hip, unspecified osteoarthritis type  M16.12             Start Time: 1630  Stop Time: 1715  Total time in clinic (min): 45 minutes    Assessment  Impairments: abnormal or restricted ROM, activity intolerance, impaired physical strength, pain with function, participation limitations and activity limitations    Assessment details: Patient is a 55 y.o. female who presents to physical therapy s/p L ALANNAH performed by  on 25. Patient presents to evaluation with pain, decreased range of motion, decreased strength, and decreased tolerance to activity. Patient demonstrates good tolerance to treatment and was provided with a written copy of their initial home exercise program focusing on mobility and blood clot prevention and was encouraged to perform daily per tolerance. I discussed risks, benefits, and alternatives to treatment, and answered all patient questions to patient satisfaction. Patient presents with baseline FOTO score of 9 indicating limited tolerance/ability to complete ADLs. Patient is an appropriate candidate for skilled PT and would benefit from skilled PT services to address the aforementioned impairments, achieve goals, maximize function, and improve quality of life. Pt is in agreement with this plan.    Patient Education: activity modifications as needed, pacing of activities, importance of HEP compliance, PT prognosis/POC          Goals  ST weeks  Pt will demonstrate good understanding and compliance with HEP  Pt will decrease pain to 5/10       LT weeks  Pt will improve FOTO score to > or = to 50 to indicate improved functional abilities   Pt will decrease pain to 1-2/10   Pt will increase knee strength to 5/5 for improved tolerance/independence with ADLs  Pt  will increase hip strength to 5/5 for improved tolerance/independence with ADLs  Pt will increase PROM to WNL to allow for return of AROM of affected hip  Pt will ambulate with least restrictive assistive device      Plan  Patient would benefit from: PT eval and skilled physical therapy    Planned therapy interventions: manual therapy, home exercise program, gait training, graded motor, graded exercise, graded activity, functional ROM exercises, flexibility, strengthening, stretching, therapeutic activities and therapeutic exercise    Plan of Care beginning date: 2025  Plan of Care expiration date: 2025      Subjective Evaluation    History of Present Illness  Mechanism of injury: RE   Pt is 6 days s/p L ALANNAH performed by . She had a same day surgery. Pain has been intense since surgery. She has a tough time getting comfortable in bed. She is taking tylenol for pain as the other medications she was prescribed she is allergic too or bothered her stomach. She called today to see if there was anything else she could get to take for pain. Seh feels a sharp pain down the thigh and a burning sensation.       IE  Pt is a 54 y/o female who presents to therapy for a pre-operative physical therapy appointment for a L ALANNAH scheduled for 25 with . Pt reports she has been having pain in the left hip for about a year. Pain was getting worse and she elected for surgery. She is having an anterior approach done. Surgery is going to be same day.   Pain  Current pain ratin  At worst pain rating: 10        Objective     General Comments:      Hip Comments   Gait: antalgic gait, normal mechanics    L hip AROM: held due to stage of healing    L hip PROM:  Flex 30 (pain) abd 30    L hip strength: deferred due to stage of healing    L knee strength: 4+/5     Sensation: intact/symmetrical              POC expires Unit limit Auth Expiration date PT/OT/ST + Visit Limit?   25 BOMN 25 BOMN                            Visit/Unit Tracking  AUTH Status:  Date               N/a Used                Remaining                    Diagnosis: pre-op L hip ALANNAH scheduled tentatively for 5/21/25 with    Precautions:    POC Expires: 7/22/25   Re-evaluation Date: 6/24/25   FOTO Scores/Date: Goal -50; 5/27- 9   Visit Count 1/10       Manuals 5/27        L PROM AM                       Ther Ex                Quad sets HEP       Glute sets HEP       Heel slides HEP                                               Neuro Re-Ed                                                               Ther Act                                                                                 Modalities

## 2025-05-29 ENCOUNTER — APPOINTMENT (OUTPATIENT)
Dept: PHYSICAL THERAPY | Facility: CLINIC | Age: 55
End: 2025-05-29
Attending: STUDENT IN AN ORGANIZED HEALTH CARE EDUCATION/TRAINING PROGRAM
Payer: COMMERCIAL

## 2025-05-30 ENCOUNTER — OFFICE VISIT (OUTPATIENT)
Dept: PHYSICAL THERAPY | Facility: CLINIC | Age: 55
End: 2025-05-30
Attending: STUDENT IN AN ORGANIZED HEALTH CARE EDUCATION/TRAINING PROGRAM
Payer: COMMERCIAL

## 2025-05-30 DIAGNOSIS — M16.12 OSTEOARTHRITIS OF LEFT HIP, UNSPECIFIED OSTEOARTHRITIS TYPE: Primary | ICD-10-CM

## 2025-05-30 PROCEDURE — 97140 MANUAL THERAPY 1/> REGIONS: CPT | Performed by: PHYSICAL THERAPIST

## 2025-05-30 PROCEDURE — 97110 THERAPEUTIC EXERCISES: CPT | Performed by: PHYSICAL THERAPIST

## 2025-05-30 NOTE — PROGRESS NOTES
"Daily Note     Today's date: 2025  Patient name: Shelia Valdez  : 1970  MRN: [id number]  Referring provider: Jamison Hung MD  Dx:   Encounter Diagnosis     ICD-10-CM    1. Osteoarthritis of left hip, unspecified osteoarthritis type  M16.12                      Subjective: The patient states that she had increased pain after her eval.  Reports that most pain is along her anterior thigh and into her groin.          Objective: See treatment diary below      Assessment: Initiated TE as outlined below in daily treatment diary.  Tolerated treatment well.  Decreased ROM and strength noted t/o her L hip/leg, assist needed from PT with certain TE and with getting her leg on/off the mat table.  Some rest breaks needed during session 2* fatigue and pain.  Patient to continue to ice at home to help with swelling and pain control.  Patient demonstrated fatigue post treatment and would benefit from continued PT to improve mobility and function.        Plan: Continue per plan of care.   Progress as able in upcoming visits.       POC expires Unit limit Auth Expiration date PT/OT/ST + Visit Limit?   25 BOMN 25 BOMN                           Visit/Unit Tracking  AUTH Status:  Date               N/a Used                Remaining                    Diagnosis: pre-op L hip ALANNAH scheduled tentatively for 25 with    Precautions:    POC Expires: 25   Re-evaluation Date: 25   FOTO Scores/Date: Goal -50; - 9   Visit Count 1/10 2/10      Manuals       L PROM AM ML                      Ther Ex                Quad sets HEP 3\" 2x10      Glute sets HEP 3\" 2x10      Heel slides HEP AAROM   10x, 5x - Pain      Hip Abd on board  AAROM  2x10      SAQ  3\" 2x10      Hip Abdd w/Ball  3\" 2x10      LAQ  3\" 2x10              Neuro Re-Ed                                                               Ther Act                                                                               "   Modalities

## 2025-06-02 ENCOUNTER — OFFICE VISIT (OUTPATIENT)
Dept: OBGYN CLINIC | Facility: CLINIC | Age: 55
End: 2025-06-02

## 2025-06-02 VITALS — RESPIRATION RATE: 18 BRPM | HEIGHT: 64 IN | BODY MASS INDEX: 36.7 KG/M2 | WEIGHT: 215 LBS

## 2025-06-02 DIAGNOSIS — Z96.642 S/P TOTAL LEFT HIP ARTHROPLASTY: Primary | ICD-10-CM

## 2025-06-02 PROCEDURE — 99024 POSTOP FOLLOW-UP VISIT: CPT | Performed by: STUDENT IN AN ORGANIZED HEALTH CARE EDUCATION/TRAINING PROGRAM

## 2025-06-02 NOTE — PROGRESS NOTES
"Postoperative Follow-up Note    Patient Name:  Shelia Valdez  MRN:  [id number]  Date of surgery: 5/21/25    Assessment/Plan     Assessment & Plan  S/P total left hip arthroplasty  Activity recommendations: Continue weightbearing as tolerated, okay to continue with use of the walker as needed.  Physical Therapy: Continue physical therapy with continued strict anterior hip precautions.  Small area at the proximal aspect of the incision not fully closed, 1 horizontal mattress nylon suture placed at today's visit.  Follow-up in 2 weeks for suture removal.  We discussed that we are doing this to decrease her risk for development of infection and let her know that wound issues in setting of pannus are not infrequent with anterior hip arthroplasty.  Medication recommendations: Continue use of tramadol and Tylenol as needed.  She plans for sparing use of tramadol.     Return in about 2 weeks (around 6/16/2025) for suture removal.      Subjective   Shelia Valdez returns for follow-up of left hip.  The patient is 2 week(s) post left ALANNAH and returns for routine follow-up. Patient denies chest pain, shortness of breath, fevers, chills, or wound drainage. She notes moderate pain and difficulty with going up and down steps. She does note mild numbness to her lateral thigh.  She has been taking tramadol and Tylenol intermittently with mild relief of pain.      Objective     Resp 18   Ht 5' 4\" (1.626 m)   Wt 97.5 kg (215 lb)   BMI 36.90 kg/m²     Left Hip   Incision well-healing with staples in place, removed during visit today.  Small 0.5cm area of proximal incision in inguinal crease not fully healed, no drainage and no surrounding erythema.  Fires iliopsoas, quad, tibialis anterior, gastroc  SILT all distal distributions with mild subjective decrease in sensation LFCN  2+ PT pulse      Universal Protocol:  Consent: Verbal consent obtained  Consent given by: patient  Timeout called at: 6/2/2025 4:10 PM.  Site " marked: the operative site was marked  Laceration repair    Date/Time: 6/2/2025 4:00 PM    Performed by: Jamison Hung MD  Authorized by: Jamison Hung MD  Body area: lower extremity  Location details: left hip  Laceration length: 0.5 cm  Foreign bodies: no foreign bodies  Tendon involvement: none  Nerve involvement: none  Anesthesia: local infiltration    Anesthesia:  Local Anesthetic: lidocaine 1% without epinephrine  Anesthetic total: 3.5 mL    Wound Dehiscence:  Superficial Wound Dehiscence: simple closure      Procedure Details:  Preparation: Patient was prepped and draped in the usual sterile fashion.  Skin closure: 3-0 nylon  Number of sutures: 1  Technique: horizontal mattress          Data Review     Intraoperative fluoroscopic imaging reviewed with patient today    Scribe Attestation      I,:  Jose Paulino PA-C am acting as a scribe while in the presence of the attending physician.:       I,:  Jamison Hung MD personally performed the services described in this documentation    as scribed in my presence.:

## 2025-06-03 ENCOUNTER — OFFICE VISIT (OUTPATIENT)
Dept: PHYSICAL THERAPY | Facility: CLINIC | Age: 55
End: 2025-06-03
Attending: STUDENT IN AN ORGANIZED HEALTH CARE EDUCATION/TRAINING PROGRAM
Payer: COMMERCIAL

## 2025-06-03 DIAGNOSIS — M16.12 OSTEOARTHRITIS OF LEFT HIP, UNSPECIFIED OSTEOARTHRITIS TYPE: Primary | ICD-10-CM

## 2025-06-03 PROCEDURE — 97110 THERAPEUTIC EXERCISES: CPT

## 2025-06-03 NOTE — PROGRESS NOTES
"Daily Note     Today's date: 6/3/2025  Patient name: Shelia Valdez  : 1970  MRN: [id number]  Referring provider: Jamison Hung MD  Dx:   Encounter Diagnosis     ICD-10-CM    1. Osteoarthritis of left hip, unspecified osteoarthritis type  M16.12           Start Time: 1607  Stop Time: 1640  Total time in clinic (min): 33 minutes    Subjective: Pt arrives with RW and reports soreness with 8/10 pain today, had Dr's visit yesterday for incision site.      Objective: See treatment diary below      Assessment: Pt completed program within tolerance, requiring Mod A in moving L leg on/off mat. Min A for heel slides with good muscle control. Pt utilized a rest break before transitioning from supine to sitting. Good carryover from previous visit.       Plan: Continue per plan of care.  Progress treatment as tolerated.         POC expires Unit limit Auth Expiration date PT/OT/ST + Visit Limit?   25 BOMN 25 BOMN                           Visit/Unit Tracking  AUTH Status:  Date               N/a Used                Remaining                    Diagnosis: pre-op L hip ALANNAH scheduled tentatively for 25 with    Precautions:    POC Expires: 25   Re-evaluation Date: 25   FOTO Scores/Date: Goal -50; -    Visit Count 1/10 2/10 3/10     Manuals  6/3     L PROM AM ML nt                     Ther Ex   6/3             Quad sets HEP 3\" 2x10 5\" 2x10     Glute sets HEP 3\" 2x10 5\" 2x10     Heel slides HEP AAROM   10x, 5x - Pain AAROM 10x     Hip Abd on board  AAROM  2x10 AROM 2x10     SAQ  3\" 2x10 3\" 2x10     Hip Abdd w/Ball  3\" 2x10 3\" 2x10     LAQ  3\" 2x10 3\" 2x10             Neuro Re-Ed                                                               Ther Act                                                                                 Modalities                                                         "

## 2025-06-05 ENCOUNTER — OFFICE VISIT (OUTPATIENT)
Dept: PHYSICAL THERAPY | Facility: CLINIC | Age: 55
End: 2025-06-05
Attending: STUDENT IN AN ORGANIZED HEALTH CARE EDUCATION/TRAINING PROGRAM
Payer: COMMERCIAL

## 2025-06-05 DIAGNOSIS — M16.12 OSTEOARTHRITIS OF LEFT HIP, UNSPECIFIED OSTEOARTHRITIS TYPE: Primary | ICD-10-CM

## 2025-06-05 PROCEDURE — 97110 THERAPEUTIC EXERCISES: CPT

## 2025-06-05 NOTE — PROGRESS NOTES
"Daily Note     Today's date: 2025  Patient name: Shelia Valdez  : 1970  MRN: [id number]  Referring provider: Jamison Hung MD  Dx: No diagnosis found.               Subjective: pt reports continued       Objective: See treatment diary below      Assessment: Continued pain with all mobility in the hip. Able to introduce hip abd with resistance with relief noted with completion. Attempted to introduce SLR but pain with attempt to lift hip. Tolerated treatment well. Patient would benefit from continued PT      Plan: Continue per plan of care.        POC expires Unit limit Auth Expiration date PT/OT/ST + Visit Limit?   25 BOMN 25 BOMN                           Visit/Unit Tracking  AUTH Status:  Date               N/a Used                Remaining                    Diagnosis: pre-op L hip ALANNAH scheduled tentatively for 25 with    Precautions:    POC Expires: 25   Re-evaluation Date: 25   FOTO Scores/Date: Goal -50; -    Visit Count 1/10 2/10 3/10     Manuals 5/27  5/30 6/3 6/5    L PROM AM ML nt AM                     Ther Ex   6/3 6/5            Quad sets HEP 3\" 2x10 5\" 2x10 5\" 20x     Glute sets HEP 3\" 2x10 5\" 2x10 5\" 20x     Heel slides HEP AAROM   10x, 5x - Pain AAROM 10x AAROM 10x     Hip Abd on board  AAROM  2x10 AROM 2x10 AAROM 2x10    SAQ  3\" 2x10 3\" 2x10 3\" 2x10     Hip Add w/Ball  3\" 2x10 3\" 2x10 3\" 2x10    Hip abd    Rtb 2x10x3\"     LAQ  3\" 2x10 3\" 2x10             Neuro Re-Ed                                                               Ther Act                                                                                 Modalities                                                           "

## 2025-06-10 ENCOUNTER — OFFICE VISIT (OUTPATIENT)
Dept: PHYSICAL THERAPY | Facility: CLINIC | Age: 55
End: 2025-06-10
Attending: STUDENT IN AN ORGANIZED HEALTH CARE EDUCATION/TRAINING PROGRAM
Payer: COMMERCIAL

## 2025-06-10 DIAGNOSIS — M16.12 OSTEOARTHRITIS OF LEFT HIP, UNSPECIFIED OSTEOARTHRITIS TYPE: Primary | ICD-10-CM

## 2025-06-10 PROCEDURE — 97140 MANUAL THERAPY 1/> REGIONS: CPT

## 2025-06-10 PROCEDURE — 97110 THERAPEUTIC EXERCISES: CPT

## 2025-06-10 NOTE — PROGRESS NOTES
"Daily Note     Today's date: 6/10/2025  Patient name: Shelia Valdez  : 1970  MRN: [id number]  Referring provider: Jamison Hung MD  Dx:   Encounter Diagnosis     ICD-10-CM    1. Osteoarthritis of left hip, unspecified osteoarthritis type  M16.12           Start Time: 1615  Stop Time: 1647  Total time in clinic (min): 32 minutes    Subjective: Pt reports 7/10 pain today but notes improvement since last visit.       Objective: See treatment diary below      Assessment: Pt completed program today within tolerance. Minimal increase of pain throughout session. Minimal verbal/tactile cues needed for correct muscle activation and sequencing of exercises. Good carryover from previous visit. Pt demonstrates improvement in ability to mobilize L LE throughout session. Pt verbalized that pain did not exceed 5/10 pain throughout session.      Plan: Continue per plan of care.  Progress treatment as tolerated.         POC expires Unit limit Auth Expiration date PT/OT/ST + Visit Limit?   25 BOMN 25 BOMN                           Visit/Unit Tracking  AUTH Status:  Date               N/a Used                Remaining                    Diagnosis: pre-op L hip ALANNAH scheduled tentatively for 25 with    Precautions:    POC Expires: 25   Re-evaluation Date: 25   FOTO Scores/Date: Goal -50; -    Visit Count 1/10 2/10 3/10 4/10 5/10   Manuals  6/3 6/5 6/10   L PROM AM ML nt AM  KZ                   Ther Ex   /3 6/5 6/10           Quad sets HEP 3\" 2x10 5\" 2x10 5\" 20x  5\" 20x   Glute sets HEP 3\" 2x10 5\" 2x10 5\" 20x  5\"20x   Heel slides HEP AAROM   10x, 5x - Pain AAROM 10x AAROM 10x   AAROM 10x   Hip Abd on board  AAROM  2x10 AROM 2x10 AAROM 2x10 AAROM 2x10   SAQ  3\" 2x10 3\" 2x10 3\" 2x10  3\" 2x10   Hip Add w/Ball  3\" 2x10 3\" 2x10 3\" 2x10 3\" 2x10   Hip abd    Rtb 2x10x3\"  Rtb 2x10 3\"   LAQ  3\" 2x10 3\" 2x10  3\" 2x10           Neuro Re-Ed                                          "                      Ther Act                                                                                 Modalities

## 2025-06-12 ENCOUNTER — OFFICE VISIT (OUTPATIENT)
Dept: PHYSICAL THERAPY | Facility: CLINIC | Age: 55
End: 2025-06-12
Attending: STUDENT IN AN ORGANIZED HEALTH CARE EDUCATION/TRAINING PROGRAM
Payer: COMMERCIAL

## 2025-06-12 DIAGNOSIS — M16.12 OSTEOARTHRITIS OF LEFT HIP, UNSPECIFIED OSTEOARTHRITIS TYPE: Primary | ICD-10-CM

## 2025-06-12 PROCEDURE — 97140 MANUAL THERAPY 1/> REGIONS: CPT

## 2025-06-12 PROCEDURE — 97110 THERAPEUTIC EXERCISES: CPT

## 2025-06-12 NOTE — PROGRESS NOTES
"Daily Note     Today's date: 2025  Patient name: Shelia Valdez  : 1970  MRN: [id number]  Referring provider: Jamison Hung MD  Dx:   Encounter Diagnosis     ICD-10-CM    1. Osteoarthritis of left hip, unspecified osteoarthritis type  M16.12                      Subjective: pt reports pain is 5/10 upon arrival.       Objective: See treatment diary below      Assessment: Improved tolerance to PROM this session. Continued pain with lifting the leg. Able to introduce seated amrches for hip flexor strengthening. Tolerated treatment well. Patient would benefit from continued PT      Plan: Continue per plan of care.        POC expires Unit limit Auth Expiration date PT/OT/ST + Visit Limit?   25 BOMN 25 BOMN                           Visit/Unit Tracking  AUTH Status:  Date               N/a Used                Remaining                    Diagnosis: pre-op L hip ALANNAH scheduled tentatively for 25 with    Precautions:    POC Expires: 25   Re-evaluation Date: 25   FOTO Scores/Date: Goal -50; -    Visit Count 10 2/10 3/10 4/10 5/10   Manuals 30 6/3 6/5 6/10   L PROM AM  ML nt AM  KZ                   Ther Ex   6/3 6/5 6/10           Quad sets 5\" 20x 3\" 2x10 5\" 2x10 5\" 20x  5\" 20x   Glute sets 5\" 20x 3\" 2x10 5\" 2x10 5\" 20x  5\"20x   Heel slides AAROM 10x AAROM   10x, 5x - Pain AAROM 10x AAROM 10x   AAROM 10x   Hip Abd on board AAROM 2x10 AAROM  2x10 AROM 2x10 AAROM 2x10 AAROM 2x10   Hip Add w/Ball 3' 10x 3\" 2x10 3\" 2x10 3\" 2x10 3\" 2x10   Hip abd Rtb 2x10x3\"   Rtb 2x10x3\"  Rtb 2x10 3\"   LAQ 3\" 2x10 3\" 2x10 3\" 2x10  3\" 2x10   Seated march  10x                Neuro Re-Ed                                                               Ther Act                                                                                 Modalities                                                               "

## 2025-06-16 ENCOUNTER — OFFICE VISIT (OUTPATIENT)
Dept: OBGYN CLINIC | Facility: CLINIC | Age: 55
End: 2025-06-16

## 2025-06-16 VITALS — WEIGHT: 215 LBS | BODY MASS INDEX: 36.7 KG/M2 | HEIGHT: 64 IN | RESPIRATION RATE: 18 BRPM

## 2025-06-16 DIAGNOSIS — Z96.642 STATUS POST TOTAL HIP REPLACEMENT, LEFT: Primary | ICD-10-CM

## 2025-06-16 PROCEDURE — 99024 POSTOP FOLLOW-UP VISIT: CPT | Performed by: STUDENT IN AN ORGANIZED HEALTH CARE EDUCATION/TRAINING PROGRAM

## 2025-06-16 NOTE — PROGRESS NOTES
"Postoperative Follow-up Note    Patient Name:  Shelia Valdez  MRN:  [id number]  Date of surgery: 05/21/2025    Assessment/Plan     Assessment & Plan  Status post total hip replacement, left  3.5 weeks s/p right anterior total hip arthroplasty performed on 05/21/2025    Activity recommendations: transition to activities as tolerated  Incision: Keep incision clean and dry. Avoid scrubbing or submerging  Physical Therapy: continue current program. Progress to cane as tolerated  Medication recommendations: continue multi-modal pain control regimen  Follow up in 2 weeks for repeat wound check and assessment of progress     Return in about 2 weeks (around 6/30/2025).      Subjective   Shelia Valdez returns for follow-up 3.5 weeks s/p right anterior total hip arthroplasty performed on 05/21/2025. She has been experiencing sharp anterior hip pain with occasional sensations of locking when trying to lay flat. She also experiences a burning pain to the lateral aspect of the thigh. She has been progressing in physical therapy. She is ambulating with the assistance of a walker.  No fevers, chills, or wound drainage.      Objective     Resp 18   Ht 5' 4\" (1.626 m)   Wt 97.5 kg (215 lb)   BMI 36.90 kg/m²     Respiratory:   non-labored respirations    Lymphatics:  no palpable lymph nodes    Gait:   antalgic    Neurologic:   Alert and oriented times 3  Patient with normal sensation except as noted below  Deep tendon reflexes 2+ except as noted in MSK exam      Left Hip     Inspection: incision is clean, dry, and intact. Remaining suture removed today from proximal aspect of the incision.  This is largely closed from the time of prior examination with 1 punctate spot that is yet to fully epithelialized.    Range of Motion: as expected with mild discomfort    - Trendelenburg sign    Motor: 5/5 IP/Q/HS/TA/GS    Pulses: 2+ DP / 2+ PT    SILT DP/SP/S/S/TN      Data Review     No new imaging obtained today    Scribe " Attestation      I,:  Beverly Alegria am acting as a scribe while in the presence of the attending physician.:       I,:  Jamison Hung MD personally performed the services described in this documentation    as scribed in my presence.:

## 2025-06-16 NOTE — ASSESSMENT & PLAN NOTE
3.5 weeks s/p right anterior total hip arthroplasty performed on 05/21/2025    Activity recommendations: transition to activities as tolerated  Incision: Keep incision clean and dry. Avoid scrubbing or submerging  Physical Therapy: continue current program. Progress to cane as tolerated  Medication recommendations: continue multi-modal pain control regimen  Follow up in 2 weeks for repeat wound check and assessment of progress

## 2025-06-17 ENCOUNTER — OFFICE VISIT (OUTPATIENT)
Dept: PHYSICAL THERAPY | Facility: CLINIC | Age: 55
End: 2025-06-17
Attending: STUDENT IN AN ORGANIZED HEALTH CARE EDUCATION/TRAINING PROGRAM
Payer: COMMERCIAL

## 2025-06-17 DIAGNOSIS — M16.12 OSTEOARTHRITIS OF LEFT HIP, UNSPECIFIED OSTEOARTHRITIS TYPE: Primary | ICD-10-CM

## 2025-06-17 PROCEDURE — 97140 MANUAL THERAPY 1/> REGIONS: CPT

## 2025-06-17 PROCEDURE — 97110 THERAPEUTIC EXERCISES: CPT

## 2025-06-17 NOTE — PROGRESS NOTES
"Daily Note     Today's date: 2025  Patient name: Shelia Valdez  : 1970  MRN: [id number]  Referring provider: Jamison Hung MD  Dx:   Encounter Diagnosis     ICD-10-CM    1. Osteoarthritis of left hip, unspecified osteoarthritis type  M16.12           Start Time: 1628  Stop Time: 1651  Total time in clinic (min): 23 minutes    Subjective: Pt arrived late to appointment due to traffic. Reports 5/10 pain today, soreness noted after last visit       Objective: See treatment diary below      Assessment: Pt completed program today with good tolerance and minimal increase of pain throughout session. Heel slides caused 7/10 pain but returned to baseline pain after completion. Pt verbalized compliance with HEP. Soreness verbalized by Pt at end of session.       Plan: Continue per plan of care.  Progress treatment as tolerated.         POC expires Unit limit Auth Expiration date PT/OT/ST + Visit Limit?   25 BOMN 25 BOMN                           Visit/Unit Tracking  AUTH Status:  Date               N/a Used                Remaining                    Diagnosis: pre-op L hip ALANNAH scheduled tentatively for 25 with    Precautions:    POC Expires: 25   Re-evaluation Date: 25   FOTO Scores/Date: Goal -50; -    Visit Count 6/10 7/10 3/10 4/10 5/10   Manuals  6/3 6 6/10   L PROM AM  KZ nt AM  KZ                   Ther Ex  6/3 6/ 6/10           Quad sets 5\" 20x nt 5\" 2x10 5\" 20x  5\" 20x   Glute sets 5\" 20x nt 5\" 2x10 5\" 20x  5\"20x   Heel slides AAROM 10x AAROM   10x AAROM 10x AAROM 10x   AAROM 10x   Hip Abd on board AAROM 2x10 AAROM  2x10 AROM 2x10 AAROM 2x10 AAROM 2x10   Hip Add w/Ball 3' 10x 3\" 2x10 3\" 2x10 3\" 2x10 3\" 2x10   Hip abd Rtb 2x10x3\" Rtb 2x10x3\"  Rtb 2x10x3\"  Rtb 2x10 3\"   LAQ 3\" 2x10 3\" 2x10 3\" 2x10  3\" 2x10   Seated march  10x  x10              Neuro Re-Ed                                                               Ther Act           "                                                                       Modalities

## 2025-06-19 ENCOUNTER — OFFICE VISIT (OUTPATIENT)
Dept: PHYSICAL THERAPY | Facility: CLINIC | Age: 55
End: 2025-06-19
Attending: STUDENT IN AN ORGANIZED HEALTH CARE EDUCATION/TRAINING PROGRAM
Payer: COMMERCIAL

## 2025-06-19 DIAGNOSIS — M16.12 OSTEOARTHRITIS OF LEFT HIP, UNSPECIFIED OSTEOARTHRITIS TYPE: Primary | ICD-10-CM

## 2025-06-19 PROCEDURE — 97110 THERAPEUTIC EXERCISES: CPT

## 2025-06-19 PROCEDURE — 97140 MANUAL THERAPY 1/> REGIONS: CPT

## 2025-06-19 NOTE — PROGRESS NOTES
"Daily Note     Today's date: 2025  Patient name: Shelia Valdez  : 1970  MRN: [id number]  Referring provider: Jamison Hung MD  Dx:   Encounter Diagnosis     ICD-10-CM    1. Osteoarthritis of left hip, unspecified osteoarthritis type  M16.12           Start Time: 1630  Stop Time: 1700  Total time in clinic (min): 30 minutes    Subjective: pt reports some soreness.       Objective: See treatment diary below      Assessment: Able to introduce SLR with Mod A assistance from therapist. Pulling in the hip flexors/glutes with exercises. Improved tolerance to PROM. Introduced STS to low mat with TRX. Increased weightbearing on R LE with STS.       Plan: Continue per plan of care.        POC expires Unit limit Auth Expiration date PT/OT/ST + Visit Limit?   25 BOMN 25 BOMN                           Visit/Unit Tracking  AUTH Status:  Date               N/a Used                Remaining                    Diagnosis: pre-op L hip ALANNAH scheduled tentatively for 25 with    Precautions:    POC Expires: 25   Re-evaluation Date: 25   FOTO Scores/Date: Goal -50; -    Visit Count 6/10 7/10 8/10 10 5/10   Manuals 6/12 6/17 6/19 6/5 6/10   L PROM AM  KZ AM  AM  KZ                   Ther Ex   6 6/10           Quad sets 5\" 20x nt 5\" 20x  5\" 20x  5\" 20x   Glute sets 5\" 20x nt 5\" 20x  5\" 20x  5\"20x   SLR   10x mod A     Heel slides AAROM 10x AAROM   10x AAROM 10x  AAROM 10x   AAROM 10x   Hip Abd on board AAROM 2x10 AAROM  2x10 AAROM 2x10  AAROM 2x10 AAROM 2x10   Hip Add w/Ball 3' 10x 3\" 2x10 5\" 20x  3\" 2x10 3\" 2x10   Hip abd Rtb 2x10x3\" Rtb 2x10x3\" Gtb 5\" 20x  Rtb 2x10x3\"  Rtb 2x10 3\"   LAQ 3\" 2x10 3\" 2x10 2x10 3\"   3\" 2x10   Seated march  10x  x10      TRX   STS to low mat 2x10      HR   2x10              Neuro Re-Ed                                                              Ther Act                                                                               "   Modalities

## 2025-06-24 ENCOUNTER — OFFICE VISIT (OUTPATIENT)
Dept: PHYSICAL THERAPY | Facility: CLINIC | Age: 55
End: 2025-06-24
Attending: STUDENT IN AN ORGANIZED HEALTH CARE EDUCATION/TRAINING PROGRAM
Payer: COMMERCIAL

## 2025-06-24 DIAGNOSIS — M16.12 OSTEOARTHRITIS OF LEFT HIP, UNSPECIFIED OSTEOARTHRITIS TYPE: Primary | ICD-10-CM

## 2025-06-24 PROCEDURE — 97140 MANUAL THERAPY 1/> REGIONS: CPT

## 2025-06-24 PROCEDURE — 97110 THERAPEUTIC EXERCISES: CPT

## 2025-06-24 NOTE — PROGRESS NOTES
"Daily Note     Today's date: 2025  Patient name: Shelia Valdez  : 1970  MRN: [id number]  Referring provider: Jamison Hung MD  Dx:   Encounter Diagnosis     ICD-10-CM    1. Osteoarthritis of left hip, unspecified osteoarthritis type  M16.12           Start Time: 1615  Stop Time: 1700  Total time in clinic (min): 45 minutes    Subjective: Pt reports 3/10 pain today at incision site.       Objective: See treatment diary below      Assessment: Pt completed program with good tolerance today, with no c/o increased pain. Pt required minimal verbal/tactile cues for correct form and sequencing of exercises. Standing exercises were tolerated well and stayed within baseline pain.  Finished session with hip PROM to decrease tightness/soreness.       Plan: Continue per plan of care.  Progress treatment as tolerated.         POC expires Unit limit Auth Expiration date PT/OT/ST + Visit Limit?   25 BOMN 25 BOMN                           Visit/Unit Tracking  AUTH Status:  Date               N/a Used                Remaining                    Diagnosis: pre-op L hip ALANNAH scheduled tentatively for 25 with    Precautions:    POC Expires: 25   Re-evaluation Date: 25   FOTO Scores/Date: Goal -50; -    Visit Count 6/10 7/10 8/10 9/10 5/10   Manuals  6/10   L PROM AM  KZ AM  KZ KZ                   Ther Ex  6/10           Quad sets 5\" 20x nt 5\" 20x  5\" 20x  5\" 20x   Glute sets 5\" 20x nt 5\" 20x  5\" 20x  5\"20x   SLR   10x mod A 10x mod A    Heel slides AAROM 10x AAROM   10x AAROM 10x  AAROM 10x   AAROM 10x   Hip Abd on board AAROM 2x10 AAROM  2x10 AAROM 2x10  AAROM 2x10 AAROM 2x10   Hip Add w/Ball 3' 10x 3\" 2x10 5\" 20x  5\" 2x10 3\" 2x10   Hip abd Rtb 2x10x3\" Rtb 2x10x3\" Gtb 5\" 20x  gtb 2x10x3\"  Rtb 2x10 3\"   LAQ 3\" 2x10 3\" 2x10 2x10 3\"  2x10x3\" 3\" 2x10     10x  x10      TRX   STS to low mat 2x10  STS to low mat 2x10    HR   2x10  " 2x10            Neuro Re-Ed                                                              Ther Act                                                                                 Modalities

## 2025-06-26 ENCOUNTER — OFFICE VISIT (OUTPATIENT)
Dept: PHYSICAL THERAPY | Facility: CLINIC | Age: 55
End: 2025-06-26
Attending: STUDENT IN AN ORGANIZED HEALTH CARE EDUCATION/TRAINING PROGRAM
Payer: COMMERCIAL

## 2025-06-26 DIAGNOSIS — M16.12 OSTEOARTHRITIS OF LEFT HIP, UNSPECIFIED OSTEOARTHRITIS TYPE: Primary | ICD-10-CM

## 2025-06-26 PROCEDURE — 97110 THERAPEUTIC EXERCISES: CPT

## 2025-06-26 PROCEDURE — 97140 MANUAL THERAPY 1/> REGIONS: CPT

## 2025-06-26 NOTE — PROGRESS NOTES
"Daily Note     Today's date: 2025  Patient name: Shelia Valdez  : 1970  MRN: [id number]  Referring provider: Jamison Hung MD  Dx:   Encounter Diagnosis     ICD-10-CM    1. Osteoarthritis of left hip, unspecified osteoarthritis type  M16.12           Start Time: 1230  Stop Time: 1311  Total time in clinic (min): 41 minutes    Subjective: Pt reports 2/10 pain today. Pt reported that she has an appt with Dr on Monday.       Objective: See treatment diary below      Assessment: Pt completed program today with good tolerance. Completed marches in standing with good tolerance and no c/o pain during completion. Pt demonstrated improvements in ROM during PROM.       Plan: Continue per plan of care.  Progress treatment as tolerated.         POC expires Unit limit Auth Expiration date PT/OT/ST + Visit Limit?   25 BOMN 25 BOMN                           Visit/Unit Tracking  AUTH Status:  Date               N/a Used                Remaining                    Diagnosis: pre-op L hip ALANNAH scheduled tentatively for 25 with    Precautions:    POC Expires: 25   Re-evaluation Date: 25   FOTO Scores/Date: Goal -50; -    Visit Count 610 7/10 8/10 910 10/10   Manuals    L PROM AM  KZ AM  KZ KZ                   Ther Ex            Quad sets 5\" 20x nt 5\" 20x  5\" 20x  5\" 20x   Glute sets 5\" 20x nt 5\" 20x  5\" 20x  5\"20x   SLR   10x mod A 10x mod A X10 mod A   Heel slides AAROM 10x AAROM   10x AAROM 10x  AAROM 10x   AAROM 10x   Hip Abd on board AAROM 2x10 AAROM  2x10 AAROM 2x10  AAROM 2x10 AAROM 2x10   Hip Add w/Ball 3' 10x 3\" 2x10 5\" 20x  5\" 2x10 5\" 2x10   Hip abd Rtb 2x10x3\" Rtb 2x10x3\" Gtb 5\" 20x  gtb 2x10x3\"  Rtb 2x10 3\"   LAQ 3\" 2x10 3\" 2x10 2x10 3\"  2x10x3\" 3\" 2x10   Seated march  10x  x10   Standing x10   TRX   STS to low mat 2x10  STS to low mat 2x10 Low mat 2x10 STS   HR   2x10  2x10 2x10           Neuro Re-Ed            "                                                   Ther Act                                                                                 Modalities

## 2025-06-30 ENCOUNTER — OFFICE VISIT (OUTPATIENT)
Dept: OBGYN CLINIC | Facility: CLINIC | Age: 55
End: 2025-06-30

## 2025-06-30 VITALS — HEIGHT: 64 IN | RESPIRATION RATE: 18 BRPM | WEIGHT: 215 LBS | BODY MASS INDEX: 36.7 KG/M2

## 2025-06-30 DIAGNOSIS — Z96.642 STATUS POST TOTAL HIP REPLACEMENT, LEFT: Primary | ICD-10-CM

## 2025-06-30 PROCEDURE — 99024 POSTOP FOLLOW-UP VISIT: CPT | Performed by: STUDENT IN AN ORGANIZED HEALTH CARE EDUCATION/TRAINING PROGRAM

## 2025-06-30 NOTE — ASSESSMENT & PLAN NOTE
Activity recommendations: Transition to activities as tolerated.  Incision: Keep incision clean and dry. Avoid vigorous scrubbing or submerging in water until fully cleared.  Physical Therapy: Continue physical therapy.  Progress from walker to cane as tolerated.  Medication recommendations: Continue multimodal oral pain control regimen as needed.

## 2025-06-30 NOTE — PROGRESS NOTES
"Postoperative Follow-up Note    Patient Name:  Shelia Valdez  MRN:  [id number]  Date of surgery: 5/21/2025    Assessment/Plan     Assessment & Plan  Status post total hip replacement, left  Activity recommendations: Transition to activities as tolerated.  Incision: Keep incision clean and dry. Avoid vigorous scrubbing or submerging in water until fully cleared.  Physical Therapy: Continue physical therapy.  Progress from walker to cane as tolerated.  Medication recommendations: Continue multimodal oral pain control regimen as needed.     Return in about 6 weeks (around 8/11/2025) for postoperative xray.      Subjective   Shelia Valdez returns for follow-up s/p left total hip arthroplasty.  The patient is 5 weeks 5 days post op and returns for routine follow-up. Patient denies fevers, chills, chest pain, palpitations, difficulty breathing, nausea/vomiting, constipation, or any wound issues. Patient rates pain as a 4/10 over last 48 hours on average. Patient has adhered to postoperative restrictions.  She notes that she is improved significantly over the past 2 weeks.  She does continue to have burning pain down the anterior thigh, but notes that it is improved since her last visit.  She notes she has began using the cane for ambulation, and overall feels comfortable with it, noting only mild difficulty due to balance issues.  For longer distances, she continues to use a walker.  She has been going to physical therapy 2 times per week and feels as though she continues to make improvement.  She notes she has been taking tramadol intermittently, and only taking half a tablet at a time.  She has no concerns at today's visit.      Objective     Resp 18   Ht 5' 4\" (1.626 m)   Wt 97.5 kg (215 lb)   BMI 36.90 kg/m²     Left hip examination    Incision well-healing, punctate area at superior aspect of incision with further epithelialization   No surrounding erythema or ecchymosis  Active range of motion " flexion to 90 degrees  Sensation intact to saphenous, sural, tibial, deep peroneal, superficial peroneal nerves  No calf swelling or tenderness to palpation  2+ DP pulse      Data Review     No new imaging reviewed at today's visit.    Scribe Attestation      I,:  Jose Paulino PA-C am acting as a scribe while in the presence of the attending physician.:       I,:  Jamison Hung MD personally performed the services described in this documentation    as scribed in my presence.:

## 2025-07-02 ENCOUNTER — TELEPHONE (OUTPATIENT)
Age: 55
End: 2025-07-02

## 2025-07-02 DIAGNOSIS — Z96.642 STATUS POST TOTAL HIP REPLACEMENT, LEFT: Primary | ICD-10-CM

## 2025-07-02 NOTE — TELEPHONE ENCOUNTER
Caller: Shelia    Doctor: Dr. Hung    Reason for call: Patient had Hip replacement surgery, asking for another script for PT, its a new month, so a new updatedc script is needed.    She does go to [facility].      Call back#: [phone]

## 2025-07-08 ENCOUNTER — OFFICE VISIT (OUTPATIENT)
Dept: PHYSICAL THERAPY | Facility: CLINIC | Age: 55
End: 2025-07-08
Attending: STUDENT IN AN ORGANIZED HEALTH CARE EDUCATION/TRAINING PROGRAM
Payer: COMMERCIAL

## 2025-07-08 DIAGNOSIS — M16.12 OSTEOARTHRITIS OF LEFT HIP, UNSPECIFIED OSTEOARTHRITIS TYPE: Primary | ICD-10-CM

## 2025-07-08 PROCEDURE — 97110 THERAPEUTIC EXERCISES: CPT

## 2025-07-08 PROCEDURE — 97140 MANUAL THERAPY 1/> REGIONS: CPT

## 2025-07-08 NOTE — PROGRESS NOTES
"Daily Note     Today's date: 2025  Patient name: Shelia Valdez  : 1970  MRN: [id number]  Referring provider: Jamison Hung MD  Dx:   Encounter Diagnosis     ICD-10-CM    1. Osteoarthritis of left hip, unspecified osteoarthritis type  M16.12           Start Time: 1146  Stop Time: 1226  Total time in clinic (min): 40 minutes    Subjective: Reports 4/10 pain today. Arrives to clinic with SPC with good vandana.      Objective: See treatment diary below      Assessment: Pt completed program today with good tolerance. Introducing further standing exercises to strengthen and improve balance on L LE. No verbalization of sharp pain or increase throughout session. Continued c/o hooking pain during PROM upon descending to neutral from stretching into hip flexion. Minimal verbal/tactile cueing needed for correct form and sequencing of exercises.      Plan: Continue per plan of care.  Progress treatment as tolerated.         POC expires Unit limit Auth Expiration date PT/OT/ST + Visit Limit?   25 BOMN 25 BOMN                           Visit/Unit Tracking  AUTH Status:  Date               N/a Used                Remaining                    Diagnosis: pre-op L hip ALANNAH scheduled tentatively for 25 with    Precautions:    POC Expires: 25   Re-evaluation Date: 25   FOTO Scores/Date: Goal -50; -    Visit Count /10 7/10 8/10 9/10 10/10   Manuals    L PROM KZ KZ AM  KZ KZ                   Ther Ex            Quad sets 5\" 20x nt 5\" 20x  5\" 20x  5\" 20x   Glute sets 5\" 20x nt 5\" 20x  5\" 20x  5\"20x   SLR X10 mod A  10x mod A 10x mod A X10 mod A   Heel slides nt AAROM   10x AAROM 10x  AAROM 10x   AAROM 10x   Hip Abd on board nt AAROM  2x10 AAROM 2x10  AAROM 2x10 AAROM 2x10   Hip Add w/Ball 10\"x10 3\" 2x10 5\" 20x  5\" 2x10 5\" 2x10   Hip abd Rtb 2x10x3\" Rtb 2x10x3\" Gtb 5\" 20x  gtb 2x10x3\"  Rtb 2x10 3\"   LAQ nt 3\" 2x10 2x10 3\"  2x10x3\" 3\" " "2x10   Marches 20x standing x10   Standing x10           TRX 2x10 low mat  STS to low mat 2x10  STS to low mat 2x10 Low mat 2x10 STS   HR 2x10  2x10  2x10 2x10   Hip 3 way No TB x10 ea       Neuro Re-Ed 7/8       SLS 5\"x10                                                     Ther Act                                                                                 Modalities                                                                         "

## 2025-07-10 ENCOUNTER — OFFICE VISIT (OUTPATIENT)
Dept: PHYSICAL THERAPY | Facility: CLINIC | Age: 55
End: 2025-07-10
Attending: STUDENT IN AN ORGANIZED HEALTH CARE EDUCATION/TRAINING PROGRAM
Payer: COMMERCIAL

## 2025-07-10 DIAGNOSIS — Z96.642 STATUS POST TOTAL HIP REPLACEMENT, LEFT: ICD-10-CM

## 2025-07-10 DIAGNOSIS — M16.12 OSTEOARTHRITIS OF LEFT HIP, UNSPECIFIED OSTEOARTHRITIS TYPE: Primary | ICD-10-CM

## 2025-07-10 PROCEDURE — 97140 MANUAL THERAPY 1/> REGIONS: CPT

## 2025-07-10 PROCEDURE — 97110 THERAPEUTIC EXERCISES: CPT

## 2025-07-10 NOTE — PROGRESS NOTES
"Daily Note     Today's date: 7/10/2025  Patient name: Shelia Valdez  : 1970  MRN: [id number]  Referring provider: Jamison Hung MD  Dx:   Encounter Diagnosis     ICD-10-CM    1. Osteoarthritis of left hip, unspecified osteoarthritis type  M16.12       2. Status post total hip replacement, left  Z96.642 Ambulatory Referral to Physical Therapy          Start Time: 1144  Stop Time: 1228  Total time in clinic (min): 44 minutes    Subjective:  Pt reports 5/10 pain and soreness today, reporting that she has been active at home with household chores.      Objective: See treatment diary below      Assessment:  Pt completed program within tolerance today. Increase of pain noted by Pt during standing exercises that increased to 7/10 but returned to today's baseline when transitioning to table exercises. End of session, Pt verbalized 7/10 pain but tolerable after PROM.      Plan: Continue per plan of care.  Progress treatment as tolerated.         POC expires Unit limit Auth Expiration date PT/OT/ST + Visit Limit?   25 BOMN 25 BOMN                           Visit/Unit Tracking  AUTH Status:  Date               N/a Used                Remaining                    Diagnosis: pre-op L hip ALANNAH scheduled tentatively for 25 with    Precautions:    POC Expires: 25   Re-evaluation Date: 25   FOTO Scores/Date: Goal -50; -    Visit Count /10 /10 8/10 9/10 10/10   Manuals 7/8 7/10 6/19 6/24 6/26   L PROM KZ KZ AM  KZ KZ                   Ther Ex 7/8 7/10 6/19  6/24 6/26           Quad sets 5\" 20x 5\"2x10 5\" 20x  5\" 20x  5\" 20x   Glute sets 5\" 20x nt 5\" 20x  5\" 20x  5\"20x   SLR X10 mod A X10 mod A 10x mod A 10x mod A X10 mod A   Heel slides nt AAROM 10x AAROM 10x  AAROM 10x   AAROM 10x   Hip Abd on board nt AAROM x10 AAROM 2x10  AAROM 2x10 AAROM 2x10   Hip Add w/Ball 10\"x10 5\" 2x10 5\" 20x  5\" 2x10 5\" 2x10   Hip abd Rtb 2x10x3\" gtb 2x10x5\" Gtb 5\" 20x  gtb 2x10x3\"  Rtb 2x10 3\"   LAQ " "nt 3\" 2x10 2x10 3\"  2x10x3\" 3\" 2x10   Marches 20x standing X20    Standing x10           TRX 2x10 low mat 2x10 low mat  STS to low mat 2x10  STS to low mat 2x10 Low mat 2x10 STS   HR 2x10 20x 2x10  2x10 2x10   Hip 3 way No TB x10 ea nt      Neuro Re-Ed 7/8       SLS 5\"x10 5\" x10                                                    Ther Act                                                                                 Modalities                                                                           "

## 2025-07-22 ENCOUNTER — EVALUATION (OUTPATIENT)
Dept: PHYSICAL THERAPY | Facility: CLINIC | Age: 55
End: 2025-07-22
Attending: STUDENT IN AN ORGANIZED HEALTH CARE EDUCATION/TRAINING PROGRAM
Payer: COMMERCIAL

## 2025-07-22 DIAGNOSIS — Z96.642 STATUS POST TOTAL HIP REPLACEMENT, LEFT: Primary | ICD-10-CM

## 2025-07-22 DIAGNOSIS — M16.12 OSTEOARTHRITIS OF LEFT HIP, UNSPECIFIED OSTEOARTHRITIS TYPE: ICD-10-CM

## 2025-07-22 PROCEDURE — 97164 PT RE-EVAL EST PLAN CARE: CPT

## 2025-07-22 PROCEDURE — 97110 THERAPEUTIC EXERCISES: CPT

## 2025-07-22 PROCEDURE — 97140 MANUAL THERAPY 1/> REGIONS: CPT

## 2025-07-22 NOTE — PROGRESS NOTES
Progress Note    Today's date: 2025  Patient name: Shelia Valdez  : 1970  MRN: [id number]  Referring provider: Jamison Hung MD  Dx:   Encounter Diagnosis     ICD-10-CM    1. Status post total hip replacement, left  Z96.642       2. Osteoarthritis of left hip, unspecified osteoarthritis type  M16.12           Start Time: 1415  Stop Time: 1500  Total time in clinic (min): 45 minutes    Assessment  Impairments: abnormal or restricted ROM, activity intolerance, impaired physical strength, pain with function, participation limitations and activity limitations     Assessment details: Patient is a 55 y.o. female who presents to physical therapy s/p L ALANNAH performed by  on 25. Patient presents to re-evaluation with pain, decreased range of motion, decreased strength, and decreased tolerance to activity. Upon re-evaluation, patient demonstrates continues significant limitations. Range of motion both actively and passively have improved since evaluation. Ambulating with narrow based quad cane for both household and community level ambulation. Quad control, anterior thigh muscle activation limited - still unable to perform SLR without assistance.  Despite improvements, Shelia continues to perform activities below previous level of function - requires continued skilled PT services to achieve PLOF. 2/2 short term goals met. All long term goals remain appropriate.        Patient Education: activity modifications as needed, pacing of activities, importance of HEP compliance, PT prognosis/POC      Goals  ST weeks  Pt will demonstrate good understanding and compliance with HEP - MET   Pt will decrease pain to 5/10   - MET      LT weeks  Pt will improve FOTO score to > or = to 50 to indicate improved functional abilities - NV   Pt will decrease pain to 1-2/10 - NOT MET< progressed  Pt will increase knee strength to 5/5 for improved tolerance/independence with ADLs  Pt will increase hip  strength to 5/5 for improved tolerance/independence with ADLs  Pt will increase PROM to WNL to allow for return of AROM of affected hip - NOT MET   Pt will ambulate with least restrictive assistive device        Plan  Patient would benefit from: PT eval and skilled physical therapy     Planned therapy interventions: manual therapy, home exercise program, gait training, graded motor, graded exercise, graded activity, functional ROM exercises, flexibility, strengthening, stretching, therapeutic activities and therapeutic exercise     Plan of Care beginning date: 7/22/25  Plan of Care expiration date: 9/22/25   1-2x/wk      Subjective Evaluation     History of Present Illness    Update 7/22/25: Reports that she is doing okay. Still having a lot of pain at the moment. She can't take strong medications due to allergies, feels like this has slowed things down. The pain is improving but still her functional mobility is severely less than prior level of function. Continues to have extension and external rotation precautions from surgeon. She notes that she is still using the commode while at home, she is unable to sit on a regular toilet.     Mechanism of injury: RE   Pt is 6 days s/p L ALANNAH performed by . She had a same day surgery. Pain has been intense since surgery. She has a tough time getting comfortable in bed. She is taking tylenol for pain as the other medications she was prescribed she is allergic too or bothered her stomach. She called today to see if there was anything else she could get to take for pain. Seh feels a sharp pain down the thigh and a burning sensation.         IE  Pt is a 54 y/o female who presents to therapy for a pre-operative physical therapy appointment for a L ALANNAH scheduled for 5/21/25 with . Pt reports she has been having pain in the left hip for about a year. Pain was getting worse and she elected for surgery. She is having an anterior approach done. Surgery is going to be  "same day.   Pain  Current pain rating: 3  At worst pain ratin           Objective      General Comments:     Hip Comments   Gait: antalgic gait, Narrow base quad cane      L hip AROM: flex 62 pain, 35 abd (gravity elim)      L hip PROM:  Flex 95 (mild discomfort), abduction 45, IR 35. Extension neutral     L hip strength: deferred due to stage of healing     L knee strength: 4+/5      Sensation: intact/symmetrical        POC expires Unit limit Auth Expiration date PT/OT/ST + Visit Limit?   25 BOMN 25 BOMN   25                        Visit/Unit Tracking  AUTH Status:  Date                N/a Used                Remaining                    Diagnosis: pre-op L hip ALANNAH scheduled tentatively for 25 with    Precautions:    POC Expires: 25   Re-evaluation Date: 25   FOTO Scores/Date: Goal -50; -    Visit Count /10 /10  9/10 10/10   Manuals 7/8 7/10 7/22 6/24 6/26   L PROM KZ KZ MH  KZ KZ                   Ther Ex 7/8 7/10  6/24 6/26           Quad sets 5\" 20x 5\"2x10 20 x5\"  5\" 20x  5\" 20x   Glute sets 5\" 20x nt 20x5\"  5\" 20x  5\"20x   SLR X10 mod A X10 mod A 3x5 min A  10x mod A X10 mod A   Heel slides nt AAROM 10x  AAROM 10x   AAROM 10x   Hip Abd on board nt AAROM x10  AAROM 2x10 AAROM 2x10   Hip Add w/Ball 10\"x10 5\" 2x10 20x5\"  5\" 2x10 5\" 2x10   Hip abd Rtb 2x10x3\" gtb 2x10x5\" GTB 20x5\"  gtb 2x10x3\"  Rtb 2x10 3\"   LAQ nt 3\" 2x10  2x10x3\" 3\" 2x10   Marches 20x standing X20  X20 standing   Standing x10           TRX 2x10 low mat 2x10 low mat  STS low mat 2x10  STS to low mat 2x10 Low mat 2x10 STS   HR 2x10 20x 20x  2x10 2x10   Hip 3 way No TB x10 ea nt      Neuro Re-Ed        SLS 5\"x10 5\" x10                                                    Ther Act                                                                                 Modalities                                                                             "

## 2025-07-28 ENCOUNTER — TELEPHONE (OUTPATIENT)
Dept: OBGYN CLINIC | Facility: HOSPITAL | Age: 55
End: 2025-07-28

## 2025-07-28 ENCOUNTER — TELEPHONE (OUTPATIENT)
Age: 55
End: 2025-07-28

## 2025-07-29 ENCOUNTER — OFFICE VISIT (OUTPATIENT)
Dept: PHYSICAL THERAPY | Facility: CLINIC | Age: 55
End: 2025-07-29
Attending: STUDENT IN AN ORGANIZED HEALTH CARE EDUCATION/TRAINING PROGRAM
Payer: COMMERCIAL

## 2025-07-29 DIAGNOSIS — Z96.642 STATUS POST TOTAL HIP REPLACEMENT, LEFT: ICD-10-CM

## 2025-07-29 DIAGNOSIS — M16.12 OSTEOARTHRITIS OF LEFT HIP, UNSPECIFIED OSTEOARTHRITIS TYPE: Primary | ICD-10-CM

## 2025-07-29 PROCEDURE — 97140 MANUAL THERAPY 1/> REGIONS: CPT

## 2025-07-29 PROCEDURE — 97110 THERAPEUTIC EXERCISES: CPT

## 2025-08-05 ENCOUNTER — APPOINTMENT (OUTPATIENT)
Dept: RADIOLOGY | Facility: CLINIC | Age: 55
End: 2025-08-05
Attending: STUDENT IN AN ORGANIZED HEALTH CARE EDUCATION/TRAINING PROGRAM
Payer: COMMERCIAL

## 2025-08-05 ENCOUNTER — OFFICE VISIT (OUTPATIENT)
Dept: OBGYN CLINIC | Facility: CLINIC | Age: 55
End: 2025-08-05

## 2025-08-05 DIAGNOSIS — M16.12 OSTEOARTHRITIS OF LEFT HIP, UNSPECIFIED OSTEOARTHRITIS TYPE: ICD-10-CM

## 2025-08-05 DIAGNOSIS — Z96.642 S/P TOTAL LEFT HIP ARTHROPLASTY: ICD-10-CM

## 2025-08-05 DIAGNOSIS — Z96.642 STATUS POST TOTAL HIP REPLACEMENT, LEFT: ICD-10-CM

## 2025-08-05 PROCEDURE — 73502 X-RAY EXAM HIP UNI 2-3 VIEWS: CPT

## 2025-08-06 ENCOUNTER — TELEPHONE (OUTPATIENT)
Age: 55
End: 2025-08-06

## 2025-08-06 ENCOUNTER — OFFICE VISIT (OUTPATIENT)
Dept: PHYSICAL THERAPY | Facility: CLINIC | Age: 55
End: 2025-08-06
Attending: STUDENT IN AN ORGANIZED HEALTH CARE EDUCATION/TRAINING PROGRAM
Payer: COMMERCIAL

## 2025-08-06 DIAGNOSIS — M16.12 OSTEOARTHRITIS OF LEFT HIP, UNSPECIFIED OSTEOARTHRITIS TYPE: ICD-10-CM

## 2025-08-06 DIAGNOSIS — Z96.642 STATUS POST TOTAL HIP REPLACEMENT, LEFT: Primary | ICD-10-CM

## 2025-08-06 PROCEDURE — 97110 THERAPEUTIC EXERCISES: CPT

## 2025-08-06 PROCEDURE — 97140 MANUAL THERAPY 1/> REGIONS: CPT

## 2025-08-11 ENCOUNTER — OFFICE VISIT (OUTPATIENT)
Dept: NEUROLOGY | Facility: CLINIC | Age: 55
End: 2025-08-11
Payer: COMMERCIAL

## 2025-08-13 ENCOUNTER — OFFICE VISIT (OUTPATIENT)
Dept: PHYSICAL THERAPY | Facility: CLINIC | Age: 55
End: 2025-08-13
Attending: STUDENT IN AN ORGANIZED HEALTH CARE EDUCATION/TRAINING PROGRAM
Payer: COMMERCIAL

## 2025-08-14 ENCOUNTER — OFFICE VISIT (OUTPATIENT)
Dept: PHYSICAL THERAPY | Facility: CLINIC | Age: 55
End: 2025-08-14
Attending: STUDENT IN AN ORGANIZED HEALTH CARE EDUCATION/TRAINING PROGRAM
Payer: COMMERCIAL

## 2025-08-19 ENCOUNTER — OFFICE VISIT (OUTPATIENT)
Dept: PHYSICAL THERAPY | Facility: CLINIC | Age: 55
End: 2025-08-19
Attending: STUDENT IN AN ORGANIZED HEALTH CARE EDUCATION/TRAINING PROGRAM
Payer: COMMERCIAL

## 2025-08-19 DIAGNOSIS — Z96.642 STATUS POST TOTAL HIP REPLACEMENT, LEFT: ICD-10-CM

## 2025-08-19 DIAGNOSIS — M16.12 OSTEOARTHRITIS OF LEFT HIP, UNSPECIFIED OSTEOARTHRITIS TYPE: Primary | ICD-10-CM

## 2025-08-19 PROCEDURE — 97110 THERAPEUTIC EXERCISES: CPT

## 2025-08-19 PROCEDURE — 97140 MANUAL THERAPY 1/> REGIONS: CPT

## 2025-08-21 ENCOUNTER — OFFICE VISIT (OUTPATIENT)
Dept: PHYSICAL THERAPY | Facility: CLINIC | Age: 55
End: 2025-08-21
Attending: STUDENT IN AN ORGANIZED HEALTH CARE EDUCATION/TRAINING PROGRAM
Payer: COMMERCIAL

## 2025-08-21 DIAGNOSIS — Z96.642 STATUS POST TOTAL HIP REPLACEMENT, LEFT: ICD-10-CM

## 2025-08-21 DIAGNOSIS — M16.12 OSTEOARTHRITIS OF LEFT HIP, UNSPECIFIED OSTEOARTHRITIS TYPE: Primary | ICD-10-CM

## 2025-08-21 PROCEDURE — 97140 MANUAL THERAPY 1/> REGIONS: CPT

## 2025-08-21 PROCEDURE — 97110 THERAPEUTIC EXERCISES: CPT

## (undated) DEVICE — SCD SEQUENTIAL COMPRESSION COMFORT SLEEVE MEDIUM KNEE LENGTH: Brand: KENDALL SCD

## (undated) DEVICE — LAPAROTOMY SPONGE - RF AND X-RAY DETECTABLE PRE-WASHED: Brand: SITUATE

## (undated) DEVICE — 4-PORT MANIFOLD: Brand: NEPTUNE 2

## (undated) DEVICE — BLADE ELECTRODE: Brand: EDGE

## (undated) DEVICE — PACK MAJOR ORTHO W/SPLITS PBDS

## (undated) DEVICE — DRESSING MEPILEX AG BORDER POST-OP 4 X 10 IN

## (undated) DEVICE — SYRINGE 30ML LL

## (undated) DEVICE — DRAPE SHEET X-LG

## (undated) DEVICE — HOOD: Brand: T7PLUS

## (undated) DEVICE — NEEDLE 23 G X 1 SAFETY

## (undated) DEVICE — SUT MONOCRYL 3-0 PS-2 27 IN Y427H

## (undated) DEVICE — SUT ETHIBOND 1 CTX 30 IN X865H

## (undated) DEVICE — C-ARM: Brand: UNBRANDED

## (undated) DEVICE — DUAL CUT SAGITTAL BLADE

## (undated) DEVICE — COBAN 4 IN STERILE

## (undated) DEVICE — SUT VICRYL 0 CT-1 27 IN J260H

## (undated) DEVICE — CHLORAPREP HI-LITE 26ML ORANGE

## (undated) DEVICE — NEPTUNE E-SEP SMOKE EVACUATION PENCIL, COATED, 70MM BLADE, PUSH BUTTON SWITCH: Brand: NEPTUNE E-SEP

## (undated) DEVICE — BIPOLAR SEALER 23-113-1 AQM 2.3 OM NEURO: Brand: AQUAMANTYS ®

## (undated) DEVICE — SUT STRATAFIX SPIRAL PDS PLUS 1 CTX 18 IN SXPP1A400

## (undated) DEVICE — SOLUTION BOWL: Brand: KENDALL

## (undated) DEVICE — GLOVE INDICATOR PI UNDERGLOVE SZ 8 BLUE

## (undated) DEVICE — POSITIONER HANA TABLE PACK

## (undated) DEVICE — MAT ABSORBANT ARTHROSCOPY FLOOR 46 X 40 IN

## (undated) DEVICE — DISPOSABLE OR TOWEL: Brand: CARDINAL HEALTH

## (undated) DEVICE — DRAPE EQUIPMENT RF WAND

## (undated) DEVICE — GLOVE SRG BIOGEL 7.5

## (undated) DEVICE — SUT VICRYL 0 CT-1 CR/8 27IN JJ31G

## (undated) DEVICE — TRAY FOLEY 16FR URIMETER SURESTEP

## (undated) DEVICE — EXOFIN PRECISION PEN HIGH VISCOSITY TOPICAL SKIN ADHESIVE: Brand: EXOFIN PRECISION PEN, 1G

## (undated) DEVICE — 3M™ STERI-STRIP™ REINFORCED ADHESIVE SKIN CLOSURES, R1547, 1/2 IN X 4 IN (12 MM X 100 MM), 6 STRIPS/ENVELOPE: Brand: 3M™ STERI-STRIP™

## (undated) DEVICE — CAPIT HIP COP -CMNT/POR-ACTIS

## (undated) DEVICE — WET SKIN PREP TRAY: Brand: MEDLINE INDUSTRIES, INC.

## (undated) DEVICE — HEAVY DUTY TABLE COVER: Brand: CONVERTORS

## (undated) DEVICE — WEBRIL 6 IN UNSTERILE

## (undated) DEVICE — HANDPIECE SET WITH RETRACTABLE COAXIAL FAN SPRAY TIP AND SUCTION TUBE: Brand: INTERPULSE

## (undated) DEVICE — DISPOSABLE EQUIPMENT COVER: Brand: SMALL TOWEL DRAPE

## (undated) DEVICE — CAPIT HIP UPCHRG GRIPTON CUP

## (undated) DEVICE — PREP SURGICAL PURPREP 26ML

## (undated) DEVICE — 6617 IOBAN II PATIENT ISOLATION DRAPE 5/BX,4BX/CS: Brand: STERI-DRAPE™ IOBAN™ 2

## (undated) DEVICE — SUT VICRYL 2-0 CT-1 27 IN J259H

## (undated) DEVICE — MEDI-VAC YANK SUCT HNDL W/TPRD BULBOUS TIP: Brand: CARDINAL HEALTH

## (undated) DEVICE — 3M™ STERI-DRAPE™ U-DRAPE 1015: Brand: STERI-DRAPE™